# Patient Record
Sex: FEMALE | Race: BLACK OR AFRICAN AMERICAN | Employment: FULL TIME | URBAN - METROPOLITAN AREA
[De-identification: names, ages, dates, MRNs, and addresses within clinical notes are randomized per-mention and may not be internally consistent; named-entity substitution may affect disease eponyms.]

---

## 2017-01-23 ENCOUNTER — APPOINTMENT (OUTPATIENT)
Dept: CT IMAGING | Age: 52
End: 2017-01-23
Attending: EMERGENCY MEDICINE
Payer: OTHER GOVERNMENT

## 2017-01-23 ENCOUNTER — APPOINTMENT (OUTPATIENT)
Dept: GENERAL RADIOLOGY | Age: 52
End: 2017-01-23
Attending: EMERGENCY MEDICINE
Payer: OTHER GOVERNMENT

## 2017-01-23 ENCOUNTER — HOSPITAL ENCOUNTER (EMERGENCY)
Age: 52
Discharge: HOME | End: 2017-01-23
Attending: EMERGENCY MEDICINE
Payer: OTHER GOVERNMENT

## 2017-01-23 VITALS
TEMPERATURE: 97.8 F | DIASTOLIC BLOOD PRESSURE: 62 MMHG | HEART RATE: 80 BPM | WEIGHT: 170 LBS | HEIGHT: 64 IN | OXYGEN SATURATION: 99 % | BODY MASS INDEX: 29.02 KG/M2 | SYSTOLIC BLOOD PRESSURE: 145 MMHG | RESPIRATION RATE: 16 BRPM

## 2017-01-23 DIAGNOSIS — R42 DIZZINESS: ICD-10-CM

## 2017-01-23 DIAGNOSIS — R51.9 ACUTE NONINTRACTABLE HEADACHE, UNSPECIFIED HEADACHE TYPE: Primary | ICD-10-CM

## 2017-01-23 LAB
ANION GAP BLD CALC-SCNC: 9 MMOL/L (ref 3–18)
APPEARANCE UR: CLEAR
BACTERIA URNS QL MICRO: NEGATIVE /HPF
BASOPHILS # BLD AUTO: 0 K/UL (ref 0–0.06)
BASOPHILS # BLD: 0 % (ref 0–2)
BILIRUB UR QL: NEGATIVE
BUN SERPL-MCNC: 10 MG/DL (ref 7–18)
BUN/CREAT SERPL: 15 (ref 12–20)
CALCIUM SERPL-MCNC: 9.5 MG/DL (ref 8.5–10.1)
CHLORIDE SERPL-SCNC: 102 MMOL/L (ref 100–108)
CK MB CFR SERPL CALC: 1.3 % (ref 0–4)
CK MB SERPL-MCNC: 2.6 NG/ML (ref 0.5–3.6)
CK SERPL-CCNC: 199 U/L (ref 26–192)
CO2 SERPL-SCNC: 29 MMOL/L (ref 21–32)
COLOR UR: YELLOW
CREAT SERPL-MCNC: 0.66 MG/DL (ref 0.6–1.3)
DIFFERENTIAL METHOD BLD: NORMAL
EOSINOPHIL # BLD: 0.1 K/UL (ref 0–0.4)
EOSINOPHIL NFR BLD: 2 % (ref 0–5)
EPITH CASTS URNS QL MICRO: NORMAL /LPF (ref 0–5)
ERYTHROCYTE [DISTWIDTH] IN BLOOD BY AUTOMATED COUNT: 13.8 % (ref 11.6–14.5)
GLUCOSE SERPL-MCNC: 91 MG/DL (ref 74–99)
GLUCOSE UR STRIP.AUTO-MCNC: NEGATIVE MG/DL
HCT VFR BLD AUTO: 41.4 % (ref 35–45)
HGB BLD-MCNC: 13.9 G/DL (ref 12–16)
HGB UR QL STRIP: NEGATIVE
KETONES UR QL STRIP.AUTO: NEGATIVE MG/DL
LEUKOCYTE ESTERASE UR QL STRIP.AUTO: ABNORMAL
LYMPHOCYTES # BLD AUTO: 35 % (ref 21–52)
LYMPHOCYTES # BLD: 1.9 K/UL (ref 0.9–3.6)
MAGNESIUM SERPL-MCNC: 2 MG/DL (ref 1.8–2.4)
MCH RBC QN AUTO: 27.3 PG (ref 24–34)
MCHC RBC AUTO-ENTMCNC: 33.6 G/DL (ref 31–37)
MCV RBC AUTO: 81.3 FL (ref 74–97)
MONOCYTES # BLD: 0.5 K/UL (ref 0.05–1.2)
MONOCYTES NFR BLD AUTO: 10 % (ref 3–10)
NEUTS SEG # BLD: 2.8 K/UL (ref 1.8–8)
NEUTS SEG NFR BLD AUTO: 53 % (ref 40–73)
NITRITE UR QL STRIP.AUTO: NEGATIVE
PH UR STRIP: 6.5 [PH] (ref 5–8)
PLATELET # BLD AUTO: 230 K/UL (ref 135–420)
PMV BLD AUTO: 10.8 FL (ref 9.2–11.8)
POTASSIUM SERPL-SCNC: 4.4 MMOL/L (ref 3.5–5.5)
PROT UR STRIP-MCNC: NEGATIVE MG/DL
RBC # BLD AUTO: 5.09 M/UL (ref 4.2–5.3)
RBC #/AREA URNS HPF: NORMAL /HPF (ref 0–5)
SODIUM SERPL-SCNC: 140 MMOL/L (ref 136–145)
SP GR UR REFRACTOMETRY: <1.005 (ref 1–1.03)
TROPONIN I SERPL-MCNC: <0.02 NG/ML (ref 0–0.06)
UROBILINOGEN UR QL STRIP.AUTO: 0.2 EU/DL (ref 0.2–1)
WBC # BLD AUTO: 5.3 K/UL (ref 4.6–13.2)
WBC URNS QL MICRO: NORMAL /HPF (ref 0–4)

## 2017-01-23 PROCEDURE — 71020 XR CHEST PA LAT: CPT

## 2017-01-23 PROCEDURE — 74011250636 HC RX REV CODE- 250/636: Performed by: EMERGENCY MEDICINE

## 2017-01-23 PROCEDURE — 80048 BASIC METABOLIC PNL TOTAL CA: CPT

## 2017-01-23 PROCEDURE — 85025 COMPLETE CBC W/AUTO DIFF WBC: CPT

## 2017-01-23 PROCEDURE — 81001 URINALYSIS AUTO W/SCOPE: CPT

## 2017-01-23 PROCEDURE — 82550 ASSAY OF CK (CPK): CPT | Performed by: EMERGENCY MEDICINE

## 2017-01-23 PROCEDURE — 99283 EMERGENCY DEPT VISIT LOW MDM: CPT

## 2017-01-23 PROCEDURE — 83735 ASSAY OF MAGNESIUM: CPT

## 2017-01-23 PROCEDURE — 96374 THER/PROPH/DIAG INJ IV PUSH: CPT

## 2017-01-23 PROCEDURE — 93005 ELECTROCARDIOGRAM TRACING: CPT

## 2017-01-23 PROCEDURE — 70450 CT HEAD/BRAIN W/O DYE: CPT

## 2017-01-23 PROCEDURE — 96375 TX/PRO/DX INJ NEW DRUG ADDON: CPT

## 2017-01-23 RX ORDER — ONDANSETRON 4 MG/1
4 TABLET, ORALLY DISINTEGRATING ORAL
Qty: 14 TAB | Refills: 0 | Status: SHIPPED | OUTPATIENT
Start: 2017-01-23 | End: 2018-02-19

## 2017-01-23 RX ORDER — MORPHINE SULFATE 4 MG/ML
4 INJECTION, SOLUTION INTRAMUSCULAR; INTRAVENOUS
Status: COMPLETED | OUTPATIENT
Start: 2017-01-23 | End: 2017-01-23

## 2017-01-23 RX ORDER — BUTALBITAL, ACETAMINOPHEN AND CAFFEINE 300; 40; 50 MG/1; MG/1; MG/1
1 CAPSULE ORAL
Qty: 16 CAP | Refills: 0 | Status: SHIPPED | OUTPATIENT
Start: 2017-01-23 | End: 2017-10-16

## 2017-01-23 RX ORDER — ONDANSETRON 2 MG/ML
4 INJECTION INTRAMUSCULAR; INTRAVENOUS
Status: COMPLETED | OUTPATIENT
Start: 2017-01-23 | End: 2017-01-23

## 2017-01-23 RX ADMIN — Medication 4 MG: at 20:06

## 2017-01-23 RX ADMIN — ONDANSETRON 4 MG: 2 INJECTION INTRAMUSCULAR; INTRAVENOUS at 20:05

## 2017-01-23 NOTE — ED NOTES
I performed a brief evaluation, including history and physical, of the patient here in triage and I have determined that pt will need further treatment and evaluation from the main side ER physician. I have placed initial orders to help in expediting patients care.      January 23, 2017 at [facility]        Visit Vitals    BP (!) 152/96 (BP 1 Location: Left arm, BP Patient Position: At rest)    Pulse 88    Temp 97.8 °F (36.6 °C)    Resp 18    Ht 5' 4\" (1.626 m)    Wt 77.1 kg (170 lb)    SpO2 98%    BMI 29.18 kg/m2

## 2017-01-23 NOTE — ED TRIAGE NOTES
Triage: pt states that she has checked her BP at home for the past 3 days and it has been 130/98. Pt concerned that she is hypertensive. Pt states that she has a headache, feels jittery, and lightheaded.

## 2017-01-24 LAB
ATRIAL RATE: 79 BPM
CALCULATED P AXIS, ECG09: 53 DEGREES
CALCULATED R AXIS, ECG10: -7 DEGREES
CALCULATED T AXIS, ECG11: 44 DEGREES
DIAGNOSIS, 93000: NORMAL
P-R INTERVAL, ECG05: 162 MS
Q-T INTERVAL, ECG07: 404 MS
QRS DURATION, ECG06: 78 MS
QTC CALCULATION (BEZET), ECG08: 463 MS
VENTRICULAR RATE, ECG03: 79 BPM

## 2017-01-24 NOTE — ED NOTES
Roxie Bear is a 45 y.o. female that was discharged in stable. Pt was accompanied by spouse. Pt is not driving. The patients diagnosis, condition and treatment were explained to  patient and aftercare instructions were given. The patient verbalized understanding. Patient armband removed and shredded.

## 2017-01-24 NOTE — ED PROVIDER NOTES
HPI Comments: 7:13 PM. Ras Franco is a 45 y.o. female with a history of asthma, GERD, anemia, sickle cell trait, and HTN who presents to the ED c/o HTN, which started approximately 3 days ago. She states that she has been checking her BP at home for the past 3 days. The patient also c/o headache across the front of her head, increased urine, lightheadedness, and feeling \"jittery. \" The patient also c/o sharp chest pain, which started last night, bilateral leg pain, and occasional left arm pain. The patient denies currently experiencing chest pain. She states that she \"sometimes feels off-balance,\" but denies falling. The patient denies back pain. She notes that she does not take BP medications. There are no other concerns at this time. The history is provided by the patient. Past Medical History:   Diagnosis Date    Anemia     Asthma     Bowel obstruction (HCC)     GERD (gastroesophageal reflux disease)     History of blood transfusion     Hypertension     Sickle cell trait (HCC)        Past Surgical History:   Procedure Laterality Date    Hx tubal ligation      Hx appendectomy      Pr abdomen surgery proc unlisted  2006     surgery for bowel obstruction         Family History:   Problem Relation Age of Onset    Cancer Mother     Cancer Brother        Social History     Social History    Marital status: SINGLE     Spouse name: N/A    Number of children: N/A    Years of education: N/A     Occupational History    Not on file. Social History Main Topics    Smoking status: Never Smoker    Smokeless tobacco: Not on file    Alcohol use No    Drug use: No    Sexual activity: Not on file     Other Topics Concern    Not on file     Social History Narrative         ALLERGIES: Iron; Other medication; Penicillins; Sulfa (sulfonamide antibiotics); and Venofer [iron sucrose]    Review of Systems   Constitutional: Negative for fever. HENT: Negative for congestion.     Respiratory: Negative for cough and shortness of breath. Cardiovascular: Positive for chest pain. Positive for HTN   Gastrointestinal: Negative for abdominal pain and vomiting. Genitourinary: Positive for frequency (increased). Musculoskeletal: Positive for myalgias (bilateral legs and occasionally left arm). Negative for back pain. Skin: Negative for rash. Neurological: Positive for light-headedness and headaches. All other systems reviewed and are negative. Vitals:    01/23/17 1840 01/23/17 2100   BP: (!) 152/96 145/62   Pulse: 88 80   Resp: 18 16   Temp: 97.8 °F (36.6 °C)    SpO2: 98% 99%   Weight: 77.1 kg (170 lb)    Height: 5' 4\" (1.626 m)             Physical Exam   Constitutional: She is oriented to person, place, and time. She appears well-developed. HENT:   Head: Normocephalic. Mouth/Throat: Oropharynx is clear and moist.   Eyes: Pupils are equal, round, and reactive to light. Neck: Normal range of motion. Cardiovascular: Normal rate and normal heart sounds. No murmur heard. Pulmonary/Chest: Effort normal. She has no wheezes. She has no rales. Mid-sternal chest wall tenderness   Abdominal: Soft. There is no tenderness. Musculoskeletal: Normal range of motion. She exhibits no edema. Neurological: She is alert and oriented to person, place, and time. Skin: Skin is warm and dry. Nursing note and vitals reviewed. [facility]  ED Course       Procedures      Vitals:  No data found. 98% on RA, indicating adequate oxygenation. Medications ordered:   Medications   morphine injection 4 mg (4 mg IntraVENous Given 1/23/17 2006)   ondansetron (ZOFRAN) injection 4 mg (4 mg IntraVENous Given 1/23/17 2005)         Lab findings:  No results found for this or any previous visit (from the past 12 hour(s)). X-Ray, CT or other radiology findings or impressions:  CT HEAD WO CONT   Final Result   IMPRESSION (per radiology):     No evidence of an acute intracranial process.   Stable exam   XR CHEST PA LAT    No acute process (Dr. Hollis Salgado)         Progress notes, Consult notes or additional Procedure notes:   9:09 PM. Patient feeling better and has no complaints. No emc. Stable for dc and close f/u. Det ret inst given. Disposition:  Diagnosis:   1. Acute nonintractable headache, unspecified headache type    2. Dizziness        Disposition: discharged    Follow-up Information     Follow up With Details Comments Contact Info    [facility] Schedule an appointment as soon as possible for a visit in 2 days  [address] [facility] [address]           Discharge Medication List as of 1/23/2017  9:16 PM      START taking these medications    Details   ondansetron (ZOFRAN ODT) 4 mg disintegrating tablet Take 1 Tab by mouth every eight (8) hours as needed for Nausea. , Print, Disp-14 Tab, R-0      butalbital-acetaminophen-caff (FIORICET) -40 mg per capsule Take 1 Cap by mouth every six (6) hours as needed for Pain. Max Daily Amount: 4 Caps., Print, Disp-16 Cap, R-0         CONTINUE these medications which have NOT CHANGED    Details   ascorbic acid (VITAMIN C) 500 mg tablet Take 500 mg by mouth daily. , Historical Med      potassium chloride (KLOR-CON) 20 mEq packet Take 20 mEq by mouth daily. , Historical Med      diphenhydrAMINE (BENADRYL) 25 mg capsule Take 25 mg by mouth every six (6) hours as needed., Historical Med      fluticasone-salmeterol (ADVAIR DISKUS) 250-50 mcg/dose diskus inhaler Take 1 Puff by inhalation every twelve (12) hours. , Historical Med      ferrous sulfate (IRON) 325 mg (65 mg iron) tablet Take 1 Tab by mouth daily. , Normal, Disp-30 Tab, R-5      hydrOXYzine (VISTARIL) 25 mg capsule 1 tab every 6 hours as needed for itching., Normal, Disp-60 Cap, R-1      ergocalciferol (VITAMIN D2) 50,000 unit capsule Take 1 Cap by mouth every seven (7) days. , Normal, Disp-12 Cap, R-1      CYANOCOBALAMIN, VITAMIN B-12, (VITAMIN B-12 PO) Take  by mouth., Historical Med      albuterol (PROVENTIL, VENTOLIN) 90 mcg/actuation inhaler Take 1-2 Puffs by inhalation every four (4) hours as needed for Wheezing., Print, Disp-1 g, R-3      HYDROcodone-acetaminophen (NORCO) 5-325 mg per tablet Take 1-2 Tabs by mouth every eight (8) hours as needed for Pain. Max Daily Amount: 6 Tabs., Print, Disp-20 Tab, R-0      triamcinolone acetonide (KENALOG) 0.1 % ointment Apply  to affected area two (2) times a day. use thin layer, Normal, Disp-30 g, R-3      naproxen sodium (ANAPROX DS) 550 mg tablet Take 1 Tab by mouth two (2) times daily (with meals). PRN pain, Print, Disp-20 Tab, R-0           Scribe Attestation:   I, Brandon Muir, am scribing for and in the presence of Dilcia Palomino MD January 23, 2017 at 7:52 PM     Signed by: Merary Rodney, 01/23/17, 7:52 PM     Physician Attestation:   I personally performed the services described in this documentation, reviewed and edited the documentation which was dictated to the scribe in my presence, and it accurately records my words and actions.    Dilcia Palomino MD

## 2017-01-24 NOTE — DISCHARGE INSTRUCTIONS
Return for pain, fever, shortness of breath, vomiting, decreased fluid intake, weakness, numbness, dizziness, or any change or concerns. Dizziness: Care Instructions  Your Care Instructions  Dizziness is the feeling of unsteadiness or fuzziness in your head. It is different than having vertigo, which is a feeling that the room is spinning or that you are moving or falling. It is also different from lightheadedness, which is the feeling that you are about to faint. It can be hard to know what causes dizziness. Some people feel dizzy when they have migraine headaches. Sometimes bouts of flu can make you feel dizzy. Some medical conditions, such as heart problems or high blood pressure, can make you feel dizzy. Many medicines can cause dizziness, including medicines for high blood pressure, pain, or anxiety. If a medicine causes your symptoms, your doctor may recommend that you stop or change the medicine. If it is a problem with your heart, you may need medicine to help your heart work better. If there is no clear reason for your symptoms, your doctor may suggest watching and waiting for a while to see if the dizziness goes away on its own. Follow-up care is a key part of your treatment and safety. Be sure to make and go to all appointments, and call your doctor if you are having problems. It's also a good idea to know your test results and keep a list of the medicines you take. How can you care for yourself at home? · If your doctor recommends or prescribes medicine, take it exactly as directed. Call your doctor if you think you are having a problem with your medicine. · Do not drive while you feel dizzy. · Try to prevent falls. Steps you can take include:  ¨ Using nonskid mats, adding grab bars near the tub, and using night-lights. ¨ Clearing your home so that walkways are free of anything you might trip on. ¨ Letting family and friends know that you have been feeling dizzy.  This will help them know how to help you. When should you call for help? Call 911 anytime you think you may need emergency care. For example, call if:  · You passed out (lost consciousness). · You have dizziness along with symptoms of a heart attack. These may include:  ¨ Chest pain or pressure, or a strange feeling in the chest.  ¨ Sweating. ¨ Shortness of breath. ¨ Nausea or vomiting. ¨ Pain, pressure, or a strange feeling in the back, neck, jaw, or upper belly or in one or both shoulders or arms. ¨ Lightheadedness or sudden weakness. ¨ A fast or irregular heartbeat. · You have symptoms of a stroke. These may include:  ¨ Sudden numbness, tingling, weakness, or loss of movement in your face, arm, or leg, especially on only one side of your body. ¨ Sudden vision changes. ¨ Sudden trouble speaking. ¨ Sudden confusion or trouble understanding simple statements. ¨ Sudden problems with walking or balance. ¨ A sudden, severe headache that is different from past headaches. Call your doctor now or seek immediate medical care if:  · You feel dizzy and have a fever, headache, or ringing in your ears. · You have new or increased nausea and vomiting. · Your dizziness does not go away or comes back. Watch closely for changes in your health, and be sure to contact your doctor if:  · You do not get better as expected. Where can you learn more? Go to http://giselle-pradeep.info/. Enter N628 in the search box to learn more about \"Dizziness: Care Instructions. \"  Current as of: May 27, 2016  Content Version: 11.1  © 4899-6797 [employer]. Care instructions adapted under license by [employer] (which disclaims liability or warranty for this information). If you have questions about a medical condition or this instruction, always ask your healthcare professional. [address] any warranty or liability for your use of this information.            Headache: Care Instructions  Your Care Instructions    Headaches have many possible causes. Most headaches aren't a sign of a more serious problem, and they will get better on their own. Home treatment may help you feel better faster. The doctor has checked you carefully, but problems can develop later. If you notice any problems or new symptoms, get medical treatment right away. Follow-up care is a key part of your treatment and safety. Be sure to make and go to all appointments, and call your doctor if you are having problems. It's also a good idea to know your test results and keep a list of the medicines you take. How can you care for yourself at home? · Do not drive if you have taken a prescription pain medicine. · Rest in a quiet, dark room until your headache is gone. Close your eyes and try to relax or go to sleep. Don't watch TV or read. · Put a cold, moist cloth or cold pack on the painful area for 10 to 20 minutes at a time. Put a thin cloth between the cold pack and your skin. · Use a warm, moist towel or a heating pad set on low to relax tight shoulder and neck muscles. · Have someone gently massage your neck and shoulders. · Take pain medicines exactly as directed. ¨ If the doctor gave you a prescription medicine for pain, take it as prescribed. ¨ If you are not taking a prescription pain medicine, ask your doctor if you can take an over-the-counter medicine. · Be careful not to take pain medicine more often than the instructions allow, because you may get worse or more frequent headaches when the medicine wears off. · Do not ignore new symptoms that occur with a headache, such as a fever, weakness or numbness, vision changes, or confusion. These may be signs of a more serious problem. To prevent headaches  · Keep a headache diary so you can figure out what triggers your headaches. Avoiding triggers may help you prevent headaches.  Record when each headache began, how long it lasted, and what the pain was like (throbbing, aching, stabbing, or dull). Write down any other symptoms you had with the headache, such as nausea, flashing lights or dark spots, or sensitivity to bright light or loud noise. Note if the headache occurred near your period. List anything that might have triggered the headache, such as certain foods (chocolate, cheese, wine) or odors, smoke, bright light, stress, or lack of sleep. · Find healthy ways to deal with stress. Headaches are most common during or right after stressful times. Take time to relax before and after you do something that has caused a headache in the past.  · Try to keep your muscles relaxed by keeping good posture. Check your jaw, face, neck, and shoulder muscles for tension, and try relaxing them. When sitting at a desk, change positions often, and stretch for 30 seconds each hour. · Get plenty of sleep and exercise. · Eat regularly and well. Long periods without food can trigger a headache. · Treat yourself to a massage. Some people find that regular massages are very helpful in relieving tension. · Limit caffeine by not drinking too much coffee, tea, or soda. But don't quit caffeine suddenly, because that can also give you headaches. · Reduce eyestrain from computers by blinking frequently and looking away from the computer screen every so often. Make sure you have proper eyewear and that your monitor is set up properly, about an arm's length away. · Seek help if you have depression or anxiety. Your headaches may be linked to these conditions. Treatment can both prevent headaches and help with symptoms of anxiety or depression. When should you call for help? Call 911 anytime you think you may need emergency care. For example, call if:  · You have signs of a stroke. These may include:  ¨ Sudden numbness, paralysis, or weakness in your face, arm, or leg, especially on only one side of your body. ¨ Sudden vision changes. ¨ Sudden trouble speaking.   ¨ Sudden confusion or trouble understanding simple statements. ¨ Sudden problems with walking or balance. ¨ A sudden, severe headache that is different from past headaches. Call your doctor now or seek immediate medical care if:  · You have a new or worse headache. · Your headache gets much worse. Where can you learn more? Go to http://giselle-pradeep.info/. Enter M271 in the search box to learn more about \"Headache: Care Instructions. \"  Current as of: February 19, 2016  Content Version: 11.1  © 7740-4203 [employer]. Care instructions adapted under license by [employer] (which disclaims liability or warranty for this information). If you have questions about a medical condition or this instruction, always ask your healthcare professional. [address] any warranty or liability for your use of this information.

## 2017-04-27 ENCOUNTER — HOSPITAL ENCOUNTER (EMERGENCY)
Age: 52
Discharge: HOME | End: 2017-04-27
Attending: EMERGENCY MEDICINE
Payer: OTHER GOVERNMENT

## 2017-04-27 ENCOUNTER — APPOINTMENT (OUTPATIENT)
Dept: GENERAL RADIOLOGY | Age: 52
End: 2017-04-27
Attending: EMERGENCY MEDICINE
Payer: OTHER GOVERNMENT

## 2017-04-27 VITALS
RESPIRATION RATE: 16 BRPM | SYSTOLIC BLOOD PRESSURE: 131 MMHG | DIASTOLIC BLOOD PRESSURE: 91 MMHG | TEMPERATURE: 98 F | BODY MASS INDEX: 29.18 KG/M2 | HEART RATE: 80 BPM | WEIGHT: 170 LBS | OXYGEN SATURATION: 98 %

## 2017-04-27 DIAGNOSIS — S63.259A DISLOCATED FINGER, INITIAL ENCOUNTER: Primary | ICD-10-CM

## 2017-04-27 PROCEDURE — 77030008323 HC SPLNT FNGR GTR DJOR -A

## 2017-04-27 PROCEDURE — 73130 X-RAY EXAM OF HAND: CPT

## 2017-04-27 PROCEDURE — 75810000301 HC ER LEVEL 1 CLOSED TREATMNT FRACTURE/DISLOCATION

## 2017-04-27 PROCEDURE — 99283 EMERGENCY DEPT VISIT LOW MDM: CPT

## 2017-04-27 NOTE — ED PROVIDER NOTES
HPI Comments: 8:54 AM Sommer Brown is a 45 y.o. female with a history of asthma, sickle cell trait, and HTN who presents to ED c/o left 4th finger pain that she first noticed when she woke up this morning. Pt explains she is unable to straighten the finger. Pt says her finger has \"popped and hurt\" in the past but denies ever dislocating the finger. Pt denies trauma or injury. Pt also notes she was unable to straighten one of her other fingers on her left hand but that it has since \"relaxed. \" No other concerns at this time. Patient is a 45 y.o. female presenting with finger pain. Finger Pain    Pertinent negatives include no back pain. Past Medical History:   Diagnosis Date    Anemia     Asthma     Bowel obstruction (HCC)     GERD (gastroesophageal reflux disease)     History of blood transfusion     Hypertension     Sickle cell trait (HCC)        Past Surgical History:   Procedure Laterality Date    ABDOMEN SURGERY PROC UNLISTED  2006    surgery for bowel obstruction    HX APPENDECTOMY      HX TUBAL LIGATION           Family History:   Problem Relation Age of Onset    Cancer Mother     Cancer Brother        Social History     Social History    Marital status: SINGLE     Spouse name: N/A    Number of children: N/A    Years of education: N/A     Occupational History    Not on file. Social History Main Topics    Smoking status: Never Smoker    Smokeless tobacco: Not on file    Alcohol use No    Drug use: No    Sexual activity: Not on file     Other Topics Concern    Not on file     Social History Narrative         ALLERGIES: Iron; Other medication; Penicillins; Sulfa (sulfonamide antibiotics); and Venofer [iron sucrose]    Review of Systems   Constitutional: Negative for chills, fatigue, fever and unexpected weight change. HENT: Negative for congestion and rhinorrhea. Respiratory: Negative for chest tightness and shortness of breath.     Musculoskeletal: Positive for arthralgias (left MCP) and myalgias (left 4th finger). Negative for back pain. Neurological: Negative for dizziness and weakness. Psychiatric/Behavioral: The patient is not nervous/anxious. All other systems reviewed and are negative. Vitals:    04/27/17 0847   BP: (!) 131/91   Pulse: 80   Resp: 16   Temp: 98 °F (36.7 °C)   SpO2: 98%   Weight: 77.1 kg (170 lb)            Physical Exam   Constitutional: She is oriented to person, place, and time. She appears well-developed. HENT:   Head: Normocephalic and atraumatic. Mouth/Throat: Oropharynx is clear and moist.   Eyes: Conjunctivae and EOM are normal. Pupils are equal, round, and reactive to light. Neck: Normal range of motion. Neck supple. Cardiovascular: Normal rate and regular rhythm. Pulmonary/Chest: Effort normal and breath sounds normal.   Abdominal: Soft. Musculoskeletal: Normal range of motion. She exhibits tenderness. She exhibits no edema. Left 4th finger is in flexion and is acutely tender to palpation at MCP joint, no swelling or erythema. Neurological: She is alert and oriented to person, place, and time. Skin: Skin is warm and dry. Psychiatric: She has a normal mood and affect. Nursing note and vitals reviewed. [facility]  ED Course       Procedures    Vitals:  Patient Vitals for the past 12 hrs:   Temp Pulse Resp BP SpO2   04/27/17 0847 98 °F (36.7 °C) 80 16 (!) 131/91 98 %     98% on RA, indicating adequate oxygenation. Medications ordered:   Medications - No data to display      Lab findings:  No results found for this or any previous visit (from the past 12 hour(s)). X-Ray, CT or other radiology findings or impressions:  XR HAND LT MIN 3 V    (Read by )  Normal       Progress notes, Consult notes or additional Procedure notes:   9:27 AM  Disposition:  Diagnosis: No diagnosis found. Disposition: discharged Dicussed all results with Pt. All questions answered at this time.  Pt discharged in stable condition with appropriate follow up. Follow-up Information     None            Patient's Medications   Start Taking    No medications on file   Continue Taking    ALBUTEROL (PROVENTIL, VENTOLIN) 90 MCG/ACTUATION INHALER    Take 1-2 Puffs by inhalation every four (4) hours as needed for Wheezing. ASCORBIC ACID (VITAMIN C) 500 MG TABLET    Take 500 mg by mouth daily. BUTALBITAL-ACETAMINOPHEN-CAFF (FIORICET) -40 MG PER CAPSULE    Take 1 Cap by mouth every six (6) hours as needed for Pain. Max Daily Amount: 4 Caps. CYANOCOBALAMIN, VITAMIN B-12, (VITAMIN B-12 PO)    Take  by mouth. DIPHENHYDRAMINE (BENADRYL) 25 MG CAPSULE    Take 25 mg by mouth every six (6) hours as needed. ERGOCALCIFEROL (VITAMIN D2) 50,000 UNIT CAPSULE    Take 1 Cap by mouth every seven (7) days. FERROUS SULFATE (IRON) 325 MG (65 MG IRON) TABLET    Take 1 Tab by mouth daily. FLUTICASONE-SALMETEROL (ADVAIR DISKUS) 250-50 MCG/DOSE DISKUS INHALER    Take 1 Puff by inhalation every twelve (12) hours. HYDROCODONE-ACETAMINOPHEN (NORCO) 5-325 MG PER TABLET    Take 1-2 Tabs by mouth every eight (8) hours as needed for Pain. Max Daily Amount: 6 Tabs. HYDROXYZINE (VISTARIL) 25 MG CAPSULE    1 tab every 6 hours as needed for itching. NAPROXEN SODIUM (ANAPROX DS) 550 MG TABLET    Take 1 Tab by mouth two (2) times daily (with meals). PRN pain    ONDANSETRON (ZOFRAN ODT) 4 MG DISINTEGRATING TABLET    Take 1 Tab by mouth every eight (8) hours as needed for Nausea. POTASSIUM CHLORIDE (KLOR-CON) 20 MEQ PACKET    Take 20 mEq by mouth daily. TRIAMCINOLONE ACETONIDE (KENALOG) 0.1 % OINTMENT    Apply  to affected area two (2) times a day.  use thin layer   These Medications have changed    No medications on file   Stop Taking    No medications on file     Scribe Attestation:     Amelia QUINTANA, scribing for and in the presence of  Ramirez Garcia MD April 27, 2017 at 9:28 AM     Physician Attestation:   I personally performed the services described in this documentation, reviewed and edited the documentation which was dictated to the scribe in my presence, and it accurately records my words and actions. Lincoln Trejo MD  April 27, 2017     Signed by: Merary Oliver, 04/27/17, 9:00 AM                     Proced: L ring finger is dorsally dislocated at the MC-P joint. I reduced this by gentle traction. Pt tolerated well. FROM in the digit afterwards.   Lincoln Trejo MD  9:30 AM

## 2017-04-27 NOTE — ED NOTES
Christie Henson is a 45 y.o. female that was discharged in stable. Pt was accompanied by spouse. Pt is not driving. The patients diagnosis, condition and treatment were explained to  patient and aftercare instructions were given. The patient verbalized understanding. Patient discharged without removing armband and transfered to another healthcare acute, sub acute , or extended care facility. Informed of privacy risks if armband lost or stolen .

## 2017-04-27 NOTE — DISCHARGE INSTRUCTIONS
Finger Dislocation: Rehab Exercises  Your Care Instructions  Here are some examples of typical rehabilitation exercises for your condition. Start each exercise slowly. Ease off the exercise if you start to have pain. Your doctor or your physical or occupational therapist will tell you when you can start these exercises and which ones will work best for you. How to do the exercises  Finger extension    1. Place your hand flat on a table, palm down. 2. Lift and then lower your affected finger off the table. 3. Repeat 8 to 12 times. MP extension    1. Place your good hand on a table, palm up. Put your hand with the affected finger on top of your good hand with your fingers wrapped around the thumb of your good hand like you are making a fist.  2. Slowly uncurl the joints of your hand with the affected finger where your fingers connect to your hand so that only the top two joints of your fingers are bent. Your fingers will look like a hook. 3. Move back to your starting position, with your fingers wrapped around your good thumb. 4. Repeat 8 to 12 times. DIP flexion    1. With your good hand, grasp your affected finger. Your thumb will be on the top side of your finger just below the joint that is closest to your fingernail. 2. Slowly bend your affected finger only at the joint closest to your fingernail. Hold for about 6 seconds. 3. Repeat 8 to 12 times. PIP extension (with MP extension)    1. Place your good hand on a table, palm up. Put your hand with the affected finger on top of your good hand. 2. Use the thumb and fingers of your good hand to grasp below the middle joint of your affected finger. 3. Bend and then straighten the last two joints of your affected finger. 4. Repeat 8 to 12 times. Isolated PIP flexion    1. Place the hand with the affected finger flat on a table, palm up. With your other hand, press down on the fingers that are not affected.  Your affected finger will be free to move.  2. Slowly bend your affected finger. Hold for about 6 seconds. Then straighten your finger. 3. Repeat 8 to 12 times. Imaginary ball squeeze    1. Pretend to hold an imaginary ball. 2. Slowly bend your fingers around the imaginary ball, and squeeze the \"ball\" for about 6 seconds. Then slowly straighten your fingers to release the \"ball. \"  3. Repeat 8 to 12 times. Tendon glides    In this exercise, the steps follow one another to a make a continuous movement. 1. Hold your hand upward. Your fingers and thumb will be pointing straight up. Your wrist should be relaxed, following the line of your fingers and thumb. 2. Curl your fingers so that the top two joints in them are bent, and your fingers wrap down. Your fingertips should touch or be near the base of your fingers. Your fingers will look like a hook. 3. Make a fist by bending your knuckles. Your thumb can gently rest against your index (pointing) finger. 4. Unwind your fingers slightly so that your fingertips can touch the base of your palm. Your thumb can rest against your index finger. 5. Move back to your starting position, with your fingers and thumb pointing up. 6. Repeat the series of motions 8 to 12 times. Towel squeeze    1. Place a small towel roll on a table. 2. With your palm facing down, grab the towel and squeeze it for about 6 seconds. Then slowly straighten your fingers to release the towel. 3. Repeat 8 to 12 times. Towel grab    1. Fold a small towel in half, and lay it flat on a table. 2. Put your hand flat on the towel, palm down. Grab the towel, and scrunch it toward you until your hand is in a fist.  3. Slowly straighten your fingers to push the towel back so it is flat on the table again. 4. Repeat 8 to 12 times. Follow-up care is a key part of your treatment and safety. Be sure to make and go to all appointments, and call your doctor if you are having problems.  It's also a good idea to know your test results and keep a list of the medicines you take. Where can you learn more? Go to http://giselle-pradeep.info/. Enter O790 in the search box to learn more about \"Finger Dislocation: Rehab Exercises. \"  Current as of: May 23, 2016  Content Version: 11.2  © 8976-4062 [employer] Care instructions adapted under license by [employer] (which disclaims liability or warranty for this information). If you have questions about a medical condition or this instruction, always ask your healthcare professional. [address] any warranty or liability for your use of this information. Dislocated Finger: Care Instructions  Your Care Instructions  When the bones of a finger are forced out of their normal position, it is called a dislocated finger. This can happen when a finger jams or bends backward. It is common during sports. A doctor can put your finger back in its normal position. You probably knew that something was wrong with your finger right away. This is because a dislocated finger usually hurts a lot. And it doesn't look straight. Your doctor may have put a splint on the finger. This will keep it in position while it heals. Your doctor may also recommend exercises to strengthen your finger. Rest and home treatment can also help you get better. If you damaged bones or muscles, you may need more treatment. Follow-up care is a key part of your treatment and safety. Be sure to make and go to all appointments, and call your doctor if you are having problems. It's also a good idea to know your test results and keep a list of the medicines you take. How can you care for yourself at home? · If your doctor put a splint on your finger, wear it as directed. Do not remove it until your doctor says you can. · Do not do anything that makes the pain worse. · If your finger is swollen, put ice or a cold pack on it for 10 to 20 minutes at a time.  Try to do this every 1 to 2 hours for the next 3 days (when you are awake) or until the swelling goes down. Put a thin cloth between the ice and your skin. · Prop up your hand on a pillow when you ice it or anytime you sit or lie down during the next 3 days. Try to keep it above the level of your heart. This will help reduce swelling. · Take your medicines exactly as prescribed. Call your doctor if you think you are having a problem with your medicine. · Ask your doctor if you can take an over-the-counter pain medicine, such as acetaminophen (Tylenol), ibuprofen (Advil, Motrin), or naproxen (Aleve). Be safe with medicines. Read and follow all instructions on the label. · If your doctor recommends exercises, do them as directed. When should you call for help? Call your doctor now or seek immediate medical care if:  · You have signs that your finger may be dislocated again, such as:  ¨ Severe pain. ¨ A finger that looks like a bone is out of position. ¨ Not being able to bend or straighten your finger. · Your finger or hand is cool or pale or changes color. · You cannot feel or move your finger. Watch closely for changes in your health, and be sure to contact your doctor if:  · You do not get better as expected. Where can you learn more? Go to http://giselle-pradeep.info/. Enter R117 in the search box to learn more about \"Dislocated Finger: Care Instructions. \"  Current as of: May 23, 2016  Content Version: 11.2  © 1030-6559 [employer]. Care instructions adapted under license by [employer] (which disclaims liability or warranty for this information). If you have questions about a medical condition or this instruction, always ask your healthcare professional. [place] [address] any warranty or liability for your use of this information.

## 2017-05-11 ENCOUNTER — OFFICE VISIT (OUTPATIENT)
Dept: ORTHOPEDIC SURGERY | Facility: CLINIC | Age: 52
End: 2017-05-11

## 2017-05-11 VITALS
WEIGHT: 200 LBS | BODY MASS INDEX: 34.33 KG/M2 | DIASTOLIC BLOOD PRESSURE: 88 MMHG | TEMPERATURE: 98 F | HEART RATE: 70 BPM | SYSTOLIC BLOOD PRESSURE: 132 MMHG

## 2017-05-11 DIAGNOSIS — M79.642 LEFT HAND PAIN: ICD-10-CM

## 2017-05-11 DIAGNOSIS — M25.642 FINGER STIFFNESS, LEFT: ICD-10-CM

## 2017-05-11 DIAGNOSIS — D50.9 IRON DEFICIENCY ANEMIA, UNSPECIFIED IRON DEFICIENCY ANEMIA TYPE: ICD-10-CM

## 2017-05-11 DIAGNOSIS — D57.3 SICKLE CELL TRAIT (HCC): ICD-10-CM

## 2017-05-11 DIAGNOSIS — M65.342 TRIGGER FINGER, LEFT RING FINGER: Primary | ICD-10-CM

## 2017-05-11 DIAGNOSIS — M19.041 OSTEOARTHRITIS OF FINGER, RIGHT: ICD-10-CM

## 2017-05-11 RX ORDER — BETAMETHASONE SODIUM PHOSPHATE AND BETAMETHASONE ACETATE 3; 3 MG/ML; MG/ML
3 INJECTION, SUSPENSION INTRA-ARTICULAR; INTRALESIONAL; INTRAMUSCULAR; SOFT TISSUE ONCE
Qty: 0.5 ML | Refills: 0
Start: 2017-05-11 | End: 2017-05-11

## 2017-05-11 RX ORDER — BUPIVACAINE HYDROCHLORIDE 2.5 MG/ML
0.5 INJECTION, SOLUTION EPIDURAL; INFILTRATION; INTRACAUDAL ONCE
Qty: 0.5 ML | Refills: 0
Start: 2017-05-11 | End: 2017-05-11

## 2017-05-11 NOTE — MR AVS SNAPSHOT
Visit Information Date & Time Provider Department Dept. Phone Encounter #  
 5/11/2017  9:55 AM Tyrel Lewis MD [address] [facility] - [facility] [phone] [id number] Follow-up Instructions Return if symptoms worsen or fail to improve. Upcoming Health Maintenance Date Due Hepatitis C Screening 1965 PAP AKA CERVICAL CYTOLOGY 7/25/1985 BREAST CANCER SCRN MAMMOGRAM 7/25/2014 FOBT Q 1 YEAR AGE 50-75 7/25/2014 INFLUENZA AGE 9 TO ADULT 8/1/2017 DTaP/Tdap/Td series (2 - Td) 9/16/2024 Allergies as of 5/11/2017  Review Complete On: 5/11/2017 By: Jerrod See Severity Noted Reaction Type Reactions Iron High 01/03/2013   Systemic Anaphylaxis Other Medication High 03/19/2015   Systemic Nausea and Vomiting All \"mycins\" Penicillins  01/03/2013   Topical Rash  
 Sulfa (Sulfonamide Antibiotics)  01/03/2013   Topical Hives Venofer [Iron Sucrose]  04/04/2013    Rash, Itching Current Immunizations  Reviewed on 8/11/2016 Name Date Tdap 9/16/2014  7:02 PM  
  
 Not reviewed this visit You Were Diagnosed With   
  
 Codes Comments Trigger finger, left ring finger    -  Primary ICD-10-CM: V98.659 ICD-9-CM: 727.03 Left hand pain     ICD-10-CM: C06.781 ICD-9-CM: 729.5 Sickle cell trait ([facility]     ICD-10-CM: D57.3 ICD-9-CM: 282.5 Iron deficiency anemia, unspecified iron deficiency anemia type     ICD-10-CM: D50.9 ICD-9-CM: 280.9 BMI 34.0-34.9,adult     ICD-10-CM: V95.08 
ICD-9-CM: V85.34 Finger stiffness, left     ICD-10-CM: M58.354 ICD-9-CM: 719.54 Ring finger Osteoarthritis of finger, right     ICD-10-CM: M19.041 ICD-9-CM: 715.94 Second MCP joint Vitals BP Pulse Temp Weight(growth percentile) BMI OB Status 132/88 (BP 1 Location: Left arm, BP Patient Position: Sitting) 70 98 °F (36.7 °C) (Oral) 200 lb (90.7 kg) 34.33 kg/m2 Menopause Smoking Status Never Smoker BMI and BSA Data Body Mass Index Body Surface Area  
 34.33 kg/m 2 2.02 m 2 Preferred Pharmacy Pharmacy Name [facility] [address] [phone] Your Updated Medication List  
  
   
This list is accurate as of: 5/11/17 10:22 AM.  Always use your most recent med list.  
  
  
  
  
 Lonzie Kava 250-50 mcg/dose diskus inhaler Generic drug:  fluticasone-salmeterol Take 1 Puff by inhalation every twelve (12) hours. albuterol 90 mcg/actuation inhaler Commonly known as:  Nevelyn Hickory Take 1-2 Puffs by inhalation every four (4) hours as needed for Wheezing. ascorbic acid (vitamin C) 500 mg tablet Commonly known as:  VITAMIN C Take 500 mg by mouth daily. BENADRYL 25 mg capsule Generic drug:  diphenhydrAMINE Take 25 mg by mouth every six (6) hours as needed. butalbital-acetaminophen-caff -40 mg per capsule Commonly known as:  [employer] Take 1 Cap by mouth every six (6) hours as needed for Pain. Max Daily Amount: 4 Caps.  
  
 ergocalciferol 50,000 unit capsule Commonly known as:  VITAMIN D2 Take 1 Cap by mouth every seven (7) days. ferrous sulfate 325 mg (65 mg iron) tablet Commonly known as:  Iron Take 1 Tab by mouth daily. HYDROcodone-acetaminophen 5-325 mg per tablet Commonly known as:  Monica Supa Take 1-2 Tabs by mouth every eight (8) hours as needed for Pain. Max Daily Amount: 6 Tabs. hydrOXYzine pamoate 25 mg capsule Commonly known as:  VISTARIL  
1 tab every 6 hours as needed for itching. naproxen sodium 550 mg tablet Commonly known as:  Marlane Rout DS Take 1 Tab by mouth two (2) times daily (with meals). PRN pain  
  
 ondansetron 4 mg disintegrating tablet Commonly known as:  ZOFRAN ODT Take 1 Tab by mouth every eight (8) hours as needed for Nausea. potassium chloride 20 mEq packet Commonly known as:  KLOR-CON  
 Take 20 mEq by mouth daily. triamcinolone acetonide 0.1 % ointment Commonly known as:  KENALOG Apply  to affected area two (2) times a day. use thin layer VITAMIN B-12 PO Take  by mouth. Follow-up Instructions Return if symptoms worsen or fail to improve. Patient Instructions Trigger Finger: Care Instructions Your Care Instructions A trigger finger is a finger stuck in a bent position. The bent finger usually straightens out on its own. A trigger finger can be painful, but it normally is not a serious problem. Trigger fingers seem to occur more in some groups of people. These include people who have diabetes or arthritis or who have injured their hands in the past. This problem also occurs in musicians and people who  tools often. Rest, exercises, and other things you can do at home may help your trigger finger relax so that it can bend as it should. You may get a corticosteroid shot. This can reduce swelling and pain. Your doctor may put a splint on your finger. This will give your finger some rest and avoid irritating the joint. You may need surgery if the finger keeps locking in a bent position. Follow-up care is a key part of your treatment and safety. Be sure to make and go to all appointments, and call your doctor if you are having problems. It's also a good idea to know your test results and keep a list of the medicines you take. How can you care for yourself at home? · If your doctor put a splint on your finger, wear the splint as directed. Do not remove it until your doctor says you can. · You may need to change your activities to avoid movements that irritate the finger. · If your finger is swollen, put ice or a cold pack on your finger for 10 to 20 minutes at a time. Try to do this every 1 to 2 hours for the next 3 days (when you are awake) or until the swelling goes down. Put a thin cloth between the ice and your skin. · Prop up your hand on a pillow when you ice it or anytime you sit or lie down during the next 3 days. Try to keep it above the level of your heart. This will help reduce swelling. · Take your medicines exactly as prescribed. Call your doctor if you think you are having a problem with your medicine. · Ask your doctor if you can take an over-the-counter pain medicine, such as acetaminophen (Tylenol), ibuprofen (Advil, Motrin), or naproxen (Aleve). Be safe with medicines. Read and follow all instructions on the label. · If your doctor recommends exercises, do them as directed. When should you call for help? Call your doctor now or seek immediate medical care if: 
· Your finger locks in a bent position and will not straighten. Watch closely for changes in your health, and be sure to contact your doctor if: 
· You do not get better as expected. Where can you learn more? Go to http://giselle-pradeep.info/. Enter M826 in the search box to learn more about \"Trigger Finger: Care Instructions. \" Current as of: May 23, 2016 Content Version: 11.2 © 5933-8457 [employer]. Care instructions adapted under license by [employer] (which disclaims liability or warranty for this information). If you have questions about a medical condition or this instruction, always ask your healthcare professional. [address] any warranty or liability for your use of this information. Introducing [facility]! [employer] introduces [employer] patient portal. Now you can access parts of your medical record, email your doctor's office, and request medication refills online. 1. In your internet browser, go to [email or web address] 2. Click on the First Time User? Click Here link in the Sign In box. You will see the New Member Sign Up page. 3. Enter your [employer] Access Code exactly as it appears below.  You will not need to use this code after youve completed the sign-up process. If you do not sign up before the expiration date, you must request a new code. · [employer] Access Code: [email or web address] Expires: 7/26/2017  8:48 AM 
 
4. Enter the last four digits of your Social Security Number (xxxx) and Date of Birth (mm/dd/yyyy) as indicated and click Submit. You will be taken to the next sign-up page. 5. Create a [employer] ID. This will be your [employer] login ID and cannot be changed, so think of one that is secure and easy to remember. 6. Create a [employer] password. You can change your password at any time. 7. Enter your Password Reset Question and Answer. This can be used at a later time if you forget your password. 8. Enter your e-mail address. You will receive e-mail notification when new information is available in [address]. 9. Click Sign Up. You can now view and download portions of your medical record. 10. Click the Download Summary menu link to download a portable copy of your medical information. If you have questions, please visit the Frequently Asked Questions section of the [employer] website. Remember, [employer] is NOT to be used for urgent needs. For medical emergencies, dial 911. Now available from your iPhone and Android! Please provide this summary of care documentation to your next provider. Your primary care clinician is listed as NONE. If you have any questions after today's visit, please call [phone].

## 2017-05-11 NOTE — PATIENT INSTRUCTIONS
Trigger Finger: Care Instructions  Your Care Instructions  A trigger finger is a finger stuck in a bent position. The bent finger usually straightens out on its own. A trigger finger can be painful, but it normally is not a serious problem. Trigger fingers seem to occur more in some groups of people. These include people who have diabetes or arthritis or who have injured their hands in the past. This problem also occurs in musicians and people who  tools often. Rest, exercises, and other things you can do at home may help your trigger finger relax so that it can bend as it should. You may get a corticosteroid shot. This can reduce swelling and pain. Your doctor may put a splint on your finger. This will give your finger some rest and avoid irritating the joint. You may need surgery if the finger keeps locking in a bent position. Follow-up care is a key part of your treatment and safety. Be sure to make and go to all appointments, and call your doctor if you are having problems. It's also a good idea to know your test results and keep a list of the medicines you take. How can you care for yourself at home? · If your doctor put a splint on your finger, wear the splint as directed. Do not remove it until your doctor says you can. · You may need to change your activities to avoid movements that irritate the finger. · If your finger is swollen, put ice or a cold pack on your finger for 10 to 20 minutes at a time. Try to do this every 1 to 2 hours for the next 3 days (when you are awake) or until the swelling goes down. Put a thin cloth between the ice and your skin. · Prop up your hand on a pillow when you ice it or anytime you sit or lie down during the next 3 days. Try to keep it above the level of your heart. This will help reduce swelling. · Take your medicines exactly as prescribed. Call your doctor if you think you are having a problem with your medicine.   · Ask your doctor if you can take an over-the-counter pain medicine, such as acetaminophen (Tylenol), ibuprofen (Advil, Motrin), or naproxen (Aleve). Be safe with medicines. Read and follow all instructions on the label. · If your doctor recommends exercises, do them as directed. When should you call for help? Call your doctor now or seek immediate medical care if:  · Your finger locks in a bent position and will not straighten. Watch closely for changes in your health, and be sure to contact your doctor if:  · You do not get better as expected. Where can you learn more? Go to http://giselle-pradeep.info/. Enter M826 in the search box to learn more about \"Trigger Finger: Care Instructions. \"  Current as of: May 23, 2016  Content Version: 11.2  © 2364-8618 [employer]. Care instructions adapted under license by [employer] (which disclaims liability or warranty for this information). If you have questions about a medical condition or this instruction, always ask your healthcare professional. [address] any warranty or liability for your use of this information.
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
## 2017-06-14 ENCOUNTER — HOSPITAL ENCOUNTER (EMERGENCY)
Age: 52
Discharge: HOME | End: 2017-06-14
Attending: EMERGENCY MEDICINE
Payer: OTHER GOVERNMENT

## 2017-06-14 VITALS
SYSTOLIC BLOOD PRESSURE: 133 MMHG | BODY MASS INDEX: 28.51 KG/M2 | WEIGHT: 167 LBS | HEART RATE: 88 BPM | DIASTOLIC BLOOD PRESSURE: 90 MMHG | HEIGHT: 64 IN | TEMPERATURE: 98.3 F | RESPIRATION RATE: 16 BRPM | OXYGEN SATURATION: 98 %

## 2017-06-14 DIAGNOSIS — J01.40 ACUTE NON-RECURRENT PANSINUSITIS: Primary | ICD-10-CM

## 2017-06-14 PROCEDURE — 99282 EMERGENCY DEPT VISIT SF MDM: CPT

## 2017-06-14 RX ORDER — PREDNISONE 10 MG/1
TABLET ORAL
Qty: 21 TAB | Refills: 0 | Status: SHIPPED | OUTPATIENT
Start: 2017-06-14 | End: 2017-06-14

## 2017-06-14 RX ORDER — CLINDAMYCIN HYDROCHLORIDE 300 MG/1
300 CAPSULE ORAL 3 TIMES DAILY
Qty: 30 CAP | Refills: 0 | Status: SHIPPED | OUTPATIENT
Start: 2017-06-14 | End: 2017-06-24

## 2017-06-14 RX ORDER — CLINDAMYCIN HYDROCHLORIDE 300 MG/1
300 CAPSULE ORAL 3 TIMES DAILY
Qty: 30 CAP | Refills: 0 | Status: SHIPPED | OUTPATIENT
Start: 2017-06-14 | End: 2017-06-14

## 2017-06-14 RX ORDER — PREDNISONE 10 MG/1
TABLET ORAL
Qty: 21 TAB | Refills: 0 | Status: SHIPPED | OUTPATIENT
Start: 2017-06-14 | End: 2017-07-27 | Stop reason: ALTCHOICE

## 2017-06-14 NOTE — ED PROVIDER NOTES
HPI Comments: 48yoF to ED c/o sinus pain, green nasal drainage, posterior HA since Friday. States symptoms similar to previous sinus infection. Taking OTC medications w/o relief. Denies fever, chills, wheezing, CP, SOB or any other complaints. Pt states she did use her albuterol treatment today. Patient is a 45 y.o. female presenting with nasal congestion, sinus problems, and cough. The history is provided by the patient. Nasal Congestion   Associated symptoms include headaches. Sinus Infection    Associated symptoms include congestion, sinus pressure, cough and headaches. Cough   Associated symptoms include headaches. Past Medical History:   Diagnosis Date    Anemia     Asthma     BMI 34.0-34.9,adult 5/11/2017    Bowel obstruction (HCC)     Gall stones     GERD (gastroesophageal reflux disease)     History of blood transfusion     Hypertension     Sickle cell trait ([facility] [address]        Past Surgical History:   Procedure Laterality Date    ABDOMEN SURGERY PROC UNLISTED  2006    surgery for bowel obstruction    HX APPENDECTOMY      HX TUBAL LIGATION           Family History:   Problem Relation Age of Onset    Cancer Mother     Cancer Brother        Social History     Social History    Marital status:      Spouse name: N/A    Number of children: N/A    Years of education: N/A     Occupational History    Not on file. Social History Main Topics    Smoking status: Never Smoker    Smokeless tobacco: Not on file    Alcohol use No    Drug use: No    Sexual activity: Not on file     Other Topics Concern    Not on file     Social History Narrative         ALLERGIES: Iron; Other medication; Penicillins; Sulfa (sulfonamide antibiotics); and Venofer [iron sucrose]    Review of Systems   HENT: Positive for congestion and sinus pressure. Respiratory: Positive for cough. Neurological: Positive for headaches. All other systems reviewed and are negative.       There were no vitals filed for this visit. Physical Exam   Constitutional: She is oriented to person, place, and time. She appears well-developed. No distress. HENT:   Head: Normocephalic and atraumatic. Right Ear: Hearing, tympanic membrane, external ear and ear canal normal.   Left Ear: Hearing, tympanic membrane, external ear and ear canal normal.   Nose: Mucosal edema present. Right sinus exhibits maxillary sinus tenderness and frontal sinus tenderness. Left sinus exhibits maxillary sinus tenderness and frontal sinus tenderness. Mouth/Throat: Uvula is midline and oropharynx is clear and moist.   Eyes: Conjunctivae are normal.   Neck: Neck supple. Cardiovascular: Normal rate and regular rhythm. Pulmonary/Chest: Effort normal and breath sounds normal. She has no wheezes. Musculoskeletal: Normal range of motion. Neurological: She is alert and oriented to person, place, and time. Skin: Skin is warm and dry. MDM  Number of Diagnoses or Management Options  Acute non-recurrent pansinusitis:   Diagnosis management comments: Pt with sx of sinusitis. No wheezing but will put on taper of pred due to severity of sinusitis symptoms. rx for clinda for sinusitis.  ESTER Field 6:22 PM      Risk of Complications, Morbidity, and/or Mortality  Presenting problems: low  Diagnostic procedures: low  Management options: low    Patient Progress  Patient progress: stable    ED Course       Procedures

## 2017-06-14 NOTE — DISCHARGE INSTRUCTIONS
Saline Nasal Washes: Care Instructions  Your Care Instructions  Saline nasal washes help keep the nasal passages open by washing out thick or dried mucus. This simple remedy can help relieve symptoms of allergies, sinusitis, and colds. It also can make the nose feel more comfortable by keeping the mucous membranes moist. You may notice a little burning sensation in your nose the first few times you use the solution, but this usually gets better in a few days. Follow-up care is a key part of your treatment and safety. Be sure to make and go to all appointments, and call your doctor if you are having problems. It's also a good idea to know your test results and keep a list of the medicines you take. How can you care for yourself at home? · You can buy premixed saline solution in a squeeze bottle or other sinus rinse products at a drugstore. Read and follow the instructions on the label. · You also can make your own saline solution by adding 1 teaspoon of salt and 1 teaspoon of baking soda to 2 cups of distilled water. · If you use a homemade solution, pour a small amount into a clean bowl. Using a rubber bulb syringe, squeeze the syringe and place the tip in the salt water. Pull a small amount of the salt water into the syringe by relaxing your hand. · Sit down with your head tilted slightly back. Do not lie down. Put the tip of the bulb syringe or the squeeze bottle a little way into one of your nostrils. Gently drip or squirt a few drops into the nostril. Repeat with the other nostril. Some sneezing and gagging are normal at first.  · Gently blow your nose. · Wipe the syringe or bottle tip clean after each use. · Repeat this 2 or 3 times a day. · Use nasal washes gently if you have nosebleeds often. When should you call for help? Watch closely for changes in your health, and be sure to contact your doctor if:  · You often get nosebleeds. · You have problems doing the nasal washes.   Where can you learn more? Go to http://giselle-pradeep.info/. Enter [id number] in the search box to learn more about \"Saline Nasal Washes: Care Instructions. \"  Current as of: July 29, 2016  Content Version: 11.2  © 8081-4530 [employer]. Care instructions adapted under license by [employer] (which disclaims liability or warranty for this information). If you have questions about a medical condition or this instruction, always ask your healthcare professional. [address] any warranty or liability for your use of this information. Sinusitis: Care Instructions  Your Care Instructions    Sinusitis is an infection of the lining of the sinus cavities in your head. Sinusitis often follows a cold. It causes pain and pressure in your head and face. In most cases, sinusitis gets better on its own in 1 to 2 weeks. But some mild symptoms may last for several weeks. Sometimes antibiotics are needed. Follow-up care is a key part of your treatment and safety. Be sure to make and go to all appointments, and call your doctor if you are having problems. It's also a good idea to know your test results and keep a list of the medicines you take. How can you care for yourself at home? · Take an over-the-counter pain medicine, such as acetaminophen (Tylenol), ibuprofen (Advil, Motrin), or naproxen (Aleve). Read and follow all instructions on the label. · If the doctor prescribed antibiotics, take them as directed. Do not stop taking them just because you feel better. You need to take the full course of antibiotics. · Be careful when taking over-the-counter cold or flu medicines and Tylenol at the same time. Many of these medicines have acetaminophen, which is Tylenol. Read the labels to make sure that you are not taking more than the recommended dose. Too much acetaminophen (Tylenol) can be harmful.   · Breathe warm, moist air from a steamy shower, a hot bath, or a sink filled with hot water. Avoid cold, dry air. Using a humidifier in your home may help. Follow the directions for cleaning the machine. · Use saline (saltwater) nasal washes to help keep your nasal passages open and wash out mucus and bacteria. You can buy saline nose drops at a grocery store or drugstore. Or you can make your own at home by adding 1 teaspoon of salt and 1 teaspoon of baking soda to 2 cups of distilled water. If you make your own, fill a bulb syringe with the solution, insert the tip into your nostril, and squeeze gently. Alvarado Sloop your nose. · Put a hot, wet towel or a warm gel pack on your face 3 or 4 times a day for 5 to 10 minutes each time. · Try a decongestant nasal spray like oxymetazoline (Afrin). Do not use it for more than 3 days in a row. Using it for more than 3 days can make your congestion worse. When should you call for help? Call your doctor now or seek immediate medical care if:  · You have new or worse swelling or redness in your face or around your eyes. · You have a new or higher fever. Watch closely for changes in your health, and be sure to contact your doctor if:  · You have new or worse facial pain. · The mucus from your nose becomes thicker (like pus) or has new blood in it. · You are not getting better as expected. Where can you learn more? Go to http://giselle-pradeep.info/. Enter E176 in the search box to learn more about \"Sinusitis: Care Instructions. \"  Current as of: July 29, 2016  Content Version: 11.2  © 5759-3721 [employer]. Care instructions adapted under license by [employer] (which disclaims liability or warranty for this information). If you have questions about a medical condition or this instruction, always ask your healthcare professional. [address] any warranty or liability for your use of this information.

## 2017-07-27 ENCOUNTER — HOSPITAL ENCOUNTER (EMERGENCY)
Age: 52
Discharge: HOME | End: 2017-07-27
Attending: EMERGENCY MEDICINE
Payer: OTHER GOVERNMENT

## 2017-07-27 ENCOUNTER — APPOINTMENT (OUTPATIENT)
Dept: GENERAL RADIOLOGY | Age: 52
End: 2017-07-27
Attending: EMERGENCY MEDICINE
Payer: OTHER GOVERNMENT

## 2017-07-27 VITALS
SYSTOLIC BLOOD PRESSURE: 125 MMHG | TEMPERATURE: 99.8 F | HEART RATE: 99 BPM | OXYGEN SATURATION: 96 % | DIASTOLIC BLOOD PRESSURE: 85 MMHG | HEIGHT: 64 IN | BODY MASS INDEX: 29.02 KG/M2 | WEIGHT: 170 LBS | RESPIRATION RATE: 18 BRPM

## 2017-07-27 DIAGNOSIS — M54.50 ACUTE RIGHT-SIDED LOW BACK PAIN WITHOUT SCIATICA: Primary | ICD-10-CM

## 2017-07-27 LAB
APPEARANCE UR: CLEAR
BACTERIA URNS QL MICRO: NEGATIVE /HPF
BILIRUB UR QL: NEGATIVE
COLOR UR: YELLOW
EPITH CASTS URNS QL MICRO: NORMAL /LPF (ref 0–5)
GLUCOSE UR STRIP.AUTO-MCNC: NEGATIVE MG/DL
HGB UR QL STRIP: NEGATIVE
KETONES UR QL STRIP.AUTO: NEGATIVE MG/DL
LEUKOCYTE ESTERASE UR QL STRIP.AUTO: ABNORMAL
NITRITE UR QL STRIP.AUTO: NEGATIVE
PH UR STRIP: 6 [PH] (ref 5–8)
PROT UR STRIP-MCNC: NEGATIVE MG/DL
RBC #/AREA URNS HPF: NEGATIVE /HPF (ref 0–5)
SP GR UR REFRACTOMETRY: 1.01 (ref 1–1.03)
UROBILINOGEN UR QL STRIP.AUTO: 0.2 EU/DL (ref 0.2–1)
WBC URNS QL MICRO: NORMAL /HPF (ref 0–4)

## 2017-07-27 PROCEDURE — 99283 EMERGENCY DEPT VISIT LOW MDM: CPT

## 2017-07-27 PROCEDURE — 71020 XR CHEST PA LAT: CPT

## 2017-07-27 PROCEDURE — 81001 URINALYSIS AUTO W/SCOPE: CPT | Performed by: EMERGENCY MEDICINE

## 2017-07-27 RX ORDER — METHYLPREDNISOLONE 4 MG/1
TABLET ORAL
Qty: 1 DOSE PACK | Refills: 0 | Status: SHIPPED | OUTPATIENT
Start: 2017-07-27 | End: 2017-08-12

## 2017-07-27 RX ORDER — TRAMADOL HYDROCHLORIDE 50 MG/1
50 TABLET ORAL
Qty: 20 TAB | Refills: 0 | Status: SHIPPED | OUTPATIENT
Start: 2017-07-27 | End: 2017-08-12

## 2017-07-27 NOTE — ED PROVIDER NOTES
[facility]      47 y.o. female with a PMH of Asthma, HTN, and Obesity presents to the ED c/o right lower back/hip pain since yesterday morning. Pt states she works at [employer] and bends over, cutting and lifting about 100 boxes per day. Notes the pain began right after her shift. She has not taken anything for pain. Also notes a cough for the past few days, productive of clear sputum. Used her inhaler PTA with relief. Denies fever, chills, numbness, weakness, bowel/bladder function loss, abdominal pain, SOB, CP, or other symptoms at this time. No current facility-administered medications for this encounter. Current Outpatient Prescriptions   Medication Sig    methylPREDNISolone (MEDROL, CHARY,) 4 mg tablet Per dose pack instructions    traMADol (ULTRAM) 50 mg tablet Take 1 Tab by mouth every six (6) hours as needed for Pain. Max Daily Amount: 200 mg.    ondansetron (ZOFRAN ODT) 4 mg disintegrating tablet Take 1 Tab by mouth every eight (8) hours as needed for Nausea.  butalbital-acetaminophen-caff (FIORICET) -40 mg per capsule Take 1 Cap by mouth every six (6) hours as needed for Pain. Max Daily Amount: 4 Caps.  ascorbic acid (VITAMIN C) 500 mg tablet Take 500 mg by mouth daily.  potassium chloride (KLOR-CON) 20 mEq packet Take 20 mEq by mouth daily.  diphenhydrAMINE (BENADRYL) 25 mg capsule Take 25 mg by mouth every six (6) hours as needed.  fluticasone-salmeterol (ADVAIR DISKUS) 250-50 mcg/dose diskus inhaler Take 1 Puff by inhalation every twelve (12) hours.  ferrous sulfate (IRON) 325 mg (65 mg iron) tablet Take 1 Tab by mouth daily.  hydrOXYzine (VISTARIL) 25 mg capsule 1 tab every 6 hours as needed for itching.  triamcinolone acetonide (KENALOG) 0.1 % ointment Apply  to affected area two (2) times a day. use thin layer    ergocalciferol (VITAMIN D2) 50,000 unit capsule Take 1 Cap by mouth every seven (7) days.     CYANOCOBALAMIN, VITAMIN B-12, (VITAMIN B-12 PO) Take  by mouth.  naproxen sodium (ANAPROX DS) 550 mg tablet Take 1 Tab by mouth two (2) times daily (with meals). PRN pain    albuterol (PROVENTIL, VENTOLIN) 90 mcg/actuation inhaler Take 1-2 Puffs by inhalation every four (4) hours as needed for Wheezing. Past Medical History:   Diagnosis Date    Anemia     Asthma     BMI 34.0-34.9,adult 5/11/2017    Bowel obstruction (HCC)     Gall stones     GERD (gastroesophageal reflux disease)     History of blood transfusion     Hypertension     Sickle cell trait ([facility] [address]        Past Surgical History:   Procedure Laterality Date    ABDOMEN SURGERY PROC UNLISTED  2006    surgery for bowel obstruction    HX APPENDECTOMY      HX TUBAL LIGATION         Family History   Problem Relation Age of Onset    Cancer Mother     Cancer Brother        Social History     Social History    Marital status:      Spouse name: N/A    Number of children: N/A    Years of education: N/A     Occupational History    Not on file. Social History Main Topics    Smoking status: Never Smoker    Smokeless tobacco: Not on file    Alcohol use No    Drug use: No    Sexual activity: Not on file     Other Topics Concern    Not on file     Social History Narrative       Allergies   Allergen Reactions    Iron Anaphylaxis    Other Medication Nausea and Vomiting     All \"mycins\"    Penicillins Rash    Sulfa (Sulfonamide Antibiotics) Hives    Venofer [Iron Sucrose] Rash and Itching       Patient's primary care provider (as noted in EPIC):  None    Constitutional:  Denies malaise, fever, chills. ENMT:  Denies sore throat. Neck:  Denies injury or pain. Chest:  Denies pain, injury. Cardiac:  Denies chest pain or palpitations. Respiratory: + cough. Denies wheezing, difficulty breathing, shortness of breath. GI/ABD:  Denies injury, pain, distention, nausea, vomiting, diarrhea. :  Denies injury, pain, dysuria or urgency.    Back: + right lower back pain. Pelvis:  Denies injury or pain. Extremity/MS:  Denies injury or pain. Neuro:  Denies headache, LOC, dizziness, neurologic symptoms/deficits/paresthesias. Skin: Denies injury, rash, itching or skin changes. All other systems negative as reviewed. Visit Vitals    /85 (BP 1 Location: Left arm, BP Patient Position: At rest)    Pulse 99    Temp 99.8 °F (37.7 °C)    Resp 18    Ht 5' 4\" (1.626 m)    Wt 77.1 kg (170 lb)    SpO2 96%    BMI 29.18 kg/m2       PHYSICAL EXAM:  CONSTITUTIONAL:  Alert, in no apparent distress;  well developed;  well nourished. HEAD:  Normocephalic, atraumatic. EYES:  EOMI. Non-icteric sclera. Normal conjunctiva. ENTM:  Nose:  no rhinorrhea. Throat:  no erythema or exudate, mucous membranes moist.  NECK:  Supple  RESPIRATORY:  Coarse breath sounds to BLL, equal breath sounds, good air movement. Without wheezes or rales. CARDIOVASCULAR:  Regular rate and rhythm. No murmurs, rubs, or gallops. GI:  Normal bowel sounds, abdomen soft and non-tender. No rebound or guarding. BACK:  Right lower back with TTP about the SI joint;  paralumbar reproducible tenderness to palpation. No midline vertebral bony point tenderness or step-off. Normal bilateral straight leg raise. UPPER EXT:  Normal inspection. LOWER EXT:  No edema, no calf tenderness. Distal pulses intact. 5/5 strength and NVI to BLE. NEURO:  Moves all four extremities, and grossly normal motor exam.  SKIN:  No rashes;  Normal for age. PSYCH:  Alert and normal affect. DIFFERENTIAL DIAGNOSES/ MEDICAL DECISION MAKING:  Low back pain from myofascial strain/ sprain, muscle spasm, vertebral disc problem, neuropathy to include sciatica, abscess/infection, referred retroperitoneal pain from pyelonephritis or ureterolithiasis, other etiologies versus a combination of the above (example: myofascial strain/ sprain as well as muscle spasm).     IMPRESSION AND MEDICAL DECISION MAKING:  There is no signs of sciatica. No perianal decreased sensation nor bowel/bladder incontinence to suggest a neurological emergency. The patient does not have fever, no other signs of infection to suggest an epidural or other back abscess that would require emergent intervention. UA unremarkable. CXR: NAD as read by ESTER Guerra and Dr. Juana Cosme     Based upon the patients presentation with noted HPI and PE, along with the work up done in the emergency department, I believe that the patient is having paralumbar myofascial strain (lower back strain). Diagnosis:   1. Acute right-sided low back pain without sciatica      Disposition: Discharge    Follow-up Information     Follow up With Details Comments [facility] [address] [address] In 3 days  [address] [facility], [address] [facility]    [id number] [facility] EMERGENCY DEPT  If symptoms worsen [address] [id number] [phone]          Patient's Medications   Start Taking    METHYLPREDNISOLONE (MEDROL, CHARY,) 4 MG TABLET    Per dose pack instructions    TRAMADOL (ULTRAM) 50 MG TABLET    Take 1 Tab by mouth every six (6) hours as needed for Pain. Max Daily Amount: 200 mg. Continue Taking    ALBUTEROL (PROVENTIL, VENTOLIN) 90 MCG/ACTUATION INHALER    Take 1-2 Puffs by inhalation every four (4) hours as needed for Wheezing. ASCORBIC ACID (VITAMIN C) 500 MG TABLET    Take 500 mg by mouth daily. BUTALBITAL-ACETAMINOPHEN-CAFF (FIORICET) -40 MG PER CAPSULE    Take 1 Cap by mouth every six (6) hours as needed for Pain. Max Daily Amount: 4 Caps. CYANOCOBALAMIN, VITAMIN B-12, (VITAMIN B-12 PO)    Take  by mouth. DIPHENHYDRAMINE (BENADRYL) 25 MG CAPSULE    Take 25 mg by mouth every six (6) hours as needed. ERGOCALCIFEROL (VITAMIN D2) 50,000 UNIT CAPSULE    Take 1 Cap by mouth every seven (7) days.     FERROUS SULFATE (IRON) 325 MG (65 MG IRON) TABLET    Take 1 Tab by mouth daily.    FLUTICASONE-SALMETEROL (ADVAIR DISKUS) 250-50 MCG/DOSE DISKUS INHALER    Take 1 Puff by inhalation every twelve (12) hours. HYDROXYZINE (VISTARIL) 25 MG CAPSULE    1 tab every 6 hours as needed for itching. NAPROXEN SODIUM (ANAPROX DS) 550 MG TABLET    Take 1 Tab by mouth two (2) times daily (with meals). PRN pain    ONDANSETRON (ZOFRAN ODT) 4 MG DISINTEGRATING TABLET    Take 1 Tab by mouth every eight (8) hours as needed for Nausea. POTASSIUM CHLORIDE (KLOR-CON) 20 MEQ PACKET    Take 20 mEq by mouth daily. TRIAMCINOLONE ACETONIDE (KENALOG) 0.1 % OINTMENT    Apply  to affected area two (2) times a day.  use thin layer   These Medications have changed    No medications on file   Stop Taking    PREDNISONE (STERAPRED DS) 10 MG DOSE PACK    Per dose pack     ESTER Andrew

## 2017-07-27 NOTE — DISCHARGE INSTRUCTIONS
Back Pain: Care Instructions  Your Care Instructions    Back pain has many possible causes. It is often related to problems with muscles and ligaments of the back. It may also be related to problems with the nerves, discs, or bones of the back. Moving, lifting, standing, sitting, or sleeping in an awkward way can strain the back. Sometimes you don't notice the injury until later. Arthritis is another common cause of back pain. Although it may hurt a lot, back pain usually improves on its own within several weeks. Most people recover in 12 weeks or less. Using good home treatment and being careful not to stress your back can help you feel better sooner. Follow-up care is a key part of your treatment and safety. Be sure to make and go to all appointments, and call your doctor if you are having problems. Its also a good idea to know your test results and keep a list of the medicines you take. How can you care for yourself at home? · Sit or lie in positions that are most comfortable and reduce your pain. Try one of these positions when you lie down:  ¨ Lie on your back with your knees bent and supported by large pillows. ¨ Lie on the floor with your legs on the seat of a sofa or chair. Karn Miser on your side with your knees and hips bent and a pillow between your legs. ¨ Lie on your stomach if it does not make pain worse. · Do not sit up in bed, and avoid soft couches and twisted positions. Bed rest can help relieve pain at first, but it delays healing. Avoid bed rest after the first day of back pain. · Change positions every 30 minutes. If you must sit for long periods of time, take breaks from sitting. Get up and walk around, or lie in a comfortable position. · Try using a heating pad on a low or medium setting for 15 to 20 minutes every 2 or 3 hours. Try a warm shower in place of one session with the heating pad. · You can also try an ice pack for 10 to 15 minutes every 2 to 3 hours.  Put a thin cloth between the ice pack and your skin. · Take pain medicines exactly as directed. ¨ If the doctor gave you a prescription medicine for pain, take it as prescribed. ¨ If you are not taking a prescription pain medicine, ask your doctor if you can take an over-the-counter medicine. · Take short walks several times a day. You can start with 5 to 10 minutes, 3 or 4 times a day, and work up to longer walks. Walk on level surfaces and avoid hills and stairs until your back is better. · Return to work and other activities as soon as you can. Continued rest without activity is usually not good for your back. · To prevent future back pain, do exercises to stretch and strengthen your back and stomach. Learn how to use good posture, safe lifting techniques, and proper body mechanics. When should you call for help? Call your doctor now or seek immediate medical care if:  · You have new or worsening numbness in your legs. · You have new or worsening weakness in your legs. (This could make it hard to stand up.)  · You lose control of your bladder or bowels. Watch closely for changes in your health, and be sure to contact your doctor if:  · Your pain gets worse. · You are not getting better after 2 weeks. Where can you learn more? Go to http://giselle-pradeep.info/. Enter D563 in the search box to learn more about \"Back Pain: Care Instructions. \"  Current as of: March 21, 2017  Content Version: 11.3  © 7723-5198 [employer]. Care instructions adapted under license by [employer] (which disclaims liability or warranty for this information). If you have questions about a medical condition or this instruction, always ask your healthcare professional. [address] any warranty or liability for your use of this information. Low Back Pain: Exercises  Your Care Instructions  Here are some examples of typical rehabilitation exercises for your condition.  Start each exercise slowly. Ease off the exercise if you start to have pain. Your doctor or physical therapist will tell you when you can start these exercises and which ones will work best for you. How to do the exercises  Press-up    1. Lie on your stomach, supporting your body with your forearms. 2. Press your elbows down into the floor to raise your upper back. As you do this, relax your stomach muscles and allow your back to arch without using your back muscles. As your press up, do not let your hips or pelvis come off the floor. 3. Hold for 15 to 30 seconds, then relax. 4. Repeat 2 to 4 times. Alternate arm and leg (bird dog) exercise    Note: Do this exercise slowly. Try to keep your body straight at all times, and do not let one hip drop lower than the other. 1. Start on the floor, on your hands and knees. 2. Tighten your belly muscles. 3. Raise one leg off the floor, and hold it straight out behind you. Be careful not to let your hip drop down, because that will twist your trunk. 4. Hold for about 6 seconds, then lower your leg and switch to the other leg. 5. Repeat 8 to 12 times on each leg. 6. Over time, work up to holding for 10 to 30 seconds each time. 7. If you feel stable and secure with your leg raised, try raising the opposite arm straight out in front of you at the same time. Knee-to-chest exercise    1. Lie on your back with your knees bent and your feet flat on the floor. 2. Bring one knee to your chest, keeping the other foot flat on the floor (or keeping the other leg straight, whichever feels better on your lower back). 3. Keep your lower back pressed to the floor. Hold for at least 15 to 30 seconds. 4. Relax, and lower the knee to the starting position. 5. Repeat with the other leg. Repeat 2 to 4 times with each leg. 6. To get more stretch, put your other leg flat on the floor while pulling your knee to your chest.  Curl-ups    1.  Lie on the floor on your back with your knees bent at a 90-degree angle. Your feet should be flat on the floor, about 12 inches from your buttocks. 2. Cross your arms over your chest. If this bothers your neck, try putting your hands behind your neck (not your head), with your elbows spread apart. 3. Slowly tighten your belly muscles and raise your shoulder blades off the floor. 4. Keep your head in line with your body, and do not press your chin to your chest.  5. Hold this position for 1 or 2 seconds, then slowly lower yourself back down to the floor. 6. Repeat 8 to 12 times. Pelvic tilt exercise    1. Lie on your back with your knees bent. 2. \"Brace\" your stomach. This means to tighten your muscles by pulling in and imagining your belly button moving toward your spine. You should feel like your back is pressing to the floor and your hips and pelvis are rocking back. 3. Hold for about 6 seconds while you breathe smoothly. 4. Repeat 8 to 12 times. Heel dig bridging    1. Lie on your back with both knees bent and your ankles bent so that only your heels are digging into the floor. Your knees should be bent about 90 degrees. 2. Then push your heels into the floor, squeeze your buttocks, and lift your hips off the floor until your shoulders, hips, and knees are all in a straight line. 3. Hold for about 6 seconds as you continue to breathe normally, and then slowly lower your hips back down to the floor and rest for up to 10 seconds. 4. Do 8 to 12 repetitions. Hamstring stretch in doorway    1. Lie on your back in a doorway, with one leg through the open door. 2. Slide your leg up the wall to straighten your knee. You should feel a gentle stretch down the back of your leg. 3. Hold the stretch for at least 15 to 30 seconds. Do not arch your back, point your toes, or bend either knee. Keep one heel touching the floor and the other heel touching the wall. 4. Repeat with your other leg. 5. Do 2 to 4 times for each leg. Hip flexor stretch    1.  Kneel on the floor with one knee bent and one leg behind you. Place your forward knee over your foot. Keep your other knee touching the floor. 2. Slowly push your hips forward until you feel a stretch in the upper thigh of your rear leg. 3. Hold the stretch for at least 15 to 30 seconds. Repeat with your other leg. 4. Do 2 to 4 times on each side. Wall sit    1. Stand with your back 10 to 12 inches away from a wall. 2. Lean into the wall until your back is flat against it. 3. Slowly slide down until your knees are slightly bent, pressing your lower back into the wall. 4. Hold for about 6 seconds, then slide back up the wall. 5. Repeat 8 to 12 times. Follow-up care is a key part of your treatment and safety. Be sure to make and go to all appointments, and call your doctor if you are having problems. It's also a good idea to know your test results and keep a list of the medicines you take. Where can you learn more? Go to http://giselle-pradeep.info/. Enter W644 in the search box to learn more about \"Low Back Pain: Exercises. \"  Current as of: March 21, 2017  Content Version: 11.3  © 8174-7754 "[employer]. Care instructions adapted under license by [employer] (which disclaims liability or warranty for this information). If you have questions about a medical condition or this instruction, always ask your healthcare professional. [place] [address] any warranty or liability for your use of this information.

## 2017-07-27 NOTE — LETTER
[facility] EMERGENCY DEPT 
[address] [facility] [id number] [phone] Work/School Note Date: 7/27/2017 To Whom It May concern: 
 
Martín Law was seen and treated today in the emergency room by the following provider(s): 
Attending Provider: Valentina Atkins MD 
Physician Assistant: ESTER Ribera. Martín Law may return to work on 7/29/17. Sincerely, ESTER Ribera

## 2017-07-27 NOTE — ED TRIAGE NOTES
Patient states onset of right lower back pain and right hip pain on Wednesday morning after she got off work.

## 2017-09-14 ENCOUNTER — OFFICE VISIT (OUTPATIENT)
Dept: ORTHOPEDIC SURGERY | Facility: CLINIC | Age: 52
End: 2017-09-14

## 2017-09-14 VITALS
OXYGEN SATURATION: 97 % | SYSTOLIC BLOOD PRESSURE: 128 MMHG | BODY MASS INDEX: 34.11 KG/M2 | TEMPERATURE: 98.5 F | RESPIRATION RATE: 18 BRPM | DIASTOLIC BLOOD PRESSURE: 83 MMHG | HEIGHT: 64 IN | WEIGHT: 199.8 LBS | HEART RATE: 94 BPM

## 2017-09-14 DIAGNOSIS — D50.9 IRON DEFICIENCY ANEMIA, UNSPECIFIED IRON DEFICIENCY ANEMIA TYPE: ICD-10-CM

## 2017-09-14 DIAGNOSIS — M65.342 TRIGGER FINGER, LEFT RING FINGER: Primary | ICD-10-CM

## 2017-09-14 DIAGNOSIS — D57.3 SICKLE CELL TRAIT (HCC): ICD-10-CM

## 2017-09-14 DIAGNOSIS — M79.642 LEFT HAND PAIN: ICD-10-CM

## 2017-09-14 NOTE — MR AVS SNAPSHOT
Visit Information Date & Time Provider Department Dept. Phone Encounter #  
 9/14/2017  3:40 PM Quinn Srinivasan, 27 [facility] [place] [id number] [phone] Follow-up Instructions Return if symptoms worsen or fail to improve. Upcoming Health Maintenance Date Due Hepatitis C Screening 1965 PAP AKA CERVICAL CYTOLOGY 7/25/1985 BREAST CANCER SCRN MAMMOGRAM 7/25/2014 FOBT Q 1 YEAR AGE 50-75 7/25/2014 INFLUENZA AGE 9 TO ADULT 8/1/2017 DTaP/Tdap/Td series (2 - Td) 9/16/2024 Allergies as of 9/14/2017  Review Complete On: 9/14/2017 By: Carson Lynn Severity Noted Reaction Type Reactions Iron High 01/03/2013   Systemic Anaphylaxis Other Medication High 03/19/2015   Systemic Nausea and Vomiting All \"mycins\" Penicillins  01/03/2013   Topical Rash  
 Sulfa (Sulfonamide Antibiotics)  01/03/2013   Topical Hives Venofer [Iron Sucrose]  04/04/2013    Rash, Itching Current Immunizations  Reviewed on 8/11/2016 Name Date Tdap 9/16/2014  7:02 PM  
  
 Not reviewed this visit You Were Diagnosed With   
  
 Codes Comments Trigger finger, left ring finger    -  Primary ICD-10-CM: P75.518 ICD-9-CM: 727.03 Left hand pain     ICD-10-CM: I68.832 ICD-9-CM: 729.5 Sick[facility] ([facility]     ICD-10-CM: D57.3 ICD-9-CM: 282.5 Iron deficiency anemia, unspecified iron deficiency anemia type     ICD-10-CM: D50.9 ICD-9-CM: 280.9 Vitals BP Pulse Temp Resp Height(growth percentile) Weight(growth percentile) 128/83 94 98.5 °F (36.9 °C) (Oral) 18 5' 4\" (1.626 m) 199 lb 12.8 oz (90.6 kg) SpO2 BMI OB Status Smoking Status 97% 34.3 kg/m2 Menopause Never Smoker BMI and BSA Data Body Mass Index Body Surface Area  
 34.3 kg/m 2 2.02 m 2 Preferred Pharmacy Pharmacy Name Phone NO PHARMACY PREFERENCE Your Updated Medication List  
  
   
 This list is accurate as of: 9/14/17  4:11 PM.  Always use your most recent med list.  
  
  
  
  
 ADVAIR DISKUS 250-50 mcg/dose diskus inhaler Generic drug:  fluticasone-salmeterol Take 1 Puff by inhalation every twelve (12) hours. albuterol 90 mcg/actuation inhaler Commonly known as:  Agnieszka Braulio Take 1-2 Puffs by inhalation every four (4) hours as needed for Wheezing. ascorbic acid (vitamin C) 500 mg tablet Commonly known as:  VITAMIN C Take 500 mg by mouth daily. BENADRYL 25 mg capsule Generic drug:  diphenhydrAMINE Take 25 mg by mouth every six (6) hours as needed. butalbital-acetaminophen-caff -40 mg per capsule Commonly known as:  [employer] Take 1 Cap by mouth every six (6) hours as needed for Pain. Max Daily Amount: 4 Caps. cyclobenzaprine 10 mg tablet Commonly known as:  FLEXERIL Take 1 Tab by mouth three (3) times daily as needed for Muscle Spasm(s). ergocalciferol 50,000 unit capsule Commonly known as:  VITAMIN D2 Take 1 Cap by mouth every seven (7) days. naproxen sodium 550 mg tablet Commonly known as:  Irl [employer]ck DS Take 1 Tab by mouth two (2) times daily (with meals). PRN pain  
  
 ondansetron 4 mg disintegrating tablet Commonly known as:  ZOFRAN ODT Take 1 Tab by mouth every eight (8) hours as needed for Nausea. potassium chloride 20 mEq packet Commonly known as:  KLOR-CON Take 20 mEq by mouth daily. triamcinolone acetonide 0.1 % ointment Commonly known as:  KENALOG Apply  to affected area two (2) times a day. use thin layer VITAMIN B-12 PO Take  by mouth. Follow-up Instructions Return if symptoms worsen or fail to improve. Patient Instructions Trigger Finger Release: Before Your Surgery What is trigger finger release?  
Trigger finger release is surgery to make it easier to bend and straighten your finger. Your doctor will make a cut in the tissue over the tendon that helps bend your finger. This cut is called an incision. It will allow the tendon to move freely without pain. This surgery will probably be done while you are awake. The doctor will give you a shot (injection) to numb your hand and prevent pain. You also may get medicine to help you relax. The doctor will make an incision in the skin of your finger or palm. He or she will make a cut to open the tissue over the swollen part of the tendon. The doctor will close the skin incision with stitches. After surgery, you will have a small scar on your finger or palm. This will fade with time. It will probably take about 6 weeks for your hand to heal. After it heals, your finger may move easily without pain. You will go home the same day as the surgery. How soon you can return to work depends on your job. If you can do your job without using your hand, you may be able to go back in 1 or 2 days. But if your job requires you to do repeated finger or hand movements, put pressure on your hand, or lift things, you may need to take more time off work. Follow-up care is a key part of your treatment and safety. Be sure to make and go to all appointments, and call your doctor if you are having problems. It's also a good idea to know your test results and keep a list of the medicines you take. What happens before surgery? Surgery can be stressful. This information will help you understand what you can expect. And it will help you safely prepare for surgery. Preparing for surgery · Understand exactly what surgery is planned, along with the risks, benefits, and other options. · Tell your doctors ALL the medicines, vitamins, supplements, and herbal remedies you take. Some of these can increase the risk of bleeding or interact with anesthesia. · If you take blood thinners, such as warfarin (Coumadin), clopidogrel (Plavix), or aspirin, be sure to talk to your doctor. He or she will tell you if you should stop taking these medicines before your surgery. Make sure that you understand exactly what your doctor wants you to do. · Your doctor will tell you which medicines to take or stop before your surgery. You may need to stop taking certain medicines a week or more before surgery. So talk to your doctor as soon as you can. · If you have an advance directive, let your doctor know. It may include a living will and a durable power of  for health care. Bring a copy to the hospital. If you don't have one, you may want to prepare one. It lets your doctor and loved ones know your health care wishes. Doctors advise that everyone prepare these papers before any type of surgery or procedure. What happens on the day of surgery? · Follow the instructions exactly about when to stop eating and drinking. If you don't, your surgery may be canceled. If your doctor told you to take your medicines on the day of surgery, take them with only a sip of water. · Take a bath or shower before you come in for your surgery. Do not apply lotions, perfumes, deodorants, or nail polish. · Do not shave the surgical site yourself. · Take off all jewelry and piercings. And take out contact lenses, if you wear them. At the hospital or surgery center · Bring a picture ID. · The area for surgery is often marked to make sure there are no errors. · You will be kept comfortable and safe by your anesthesia provider. You may get medicine that relaxes you or puts you in a light sleep. The area being worked on will be numb. · The surgery will take less than 1 hour. Going home · Be sure you have someone to drive you home. Anesthesia and pain medicine make it unsafe for you to drive. · You will be given more specific instructions about recovering from your surgery.  They will cover things like diet, wound care, follow-up care, driving, and getting back to your normal routine. When should you call your doctor? · You have questions or concerns. · You don't understand how to prepare for your surgery. · You become ill before the surgery (such as fever, flu, or a cold). · You need to reschedule or have changed your mind about having the surgery. Where can you learn more? Go to http://giselle-pradeep.info/. Enter F208 in the search box to learn more about \"Trigger Finger Release: Before Your Surgery. \" Current as of: March 21, 2017 Content Version: 11.3 © 9612-6906 [employer]. Care instructions adapted under license by [employer] (which disclaims liability or warranty for this information). If you have questions about a medical condition or this instruction, always ask your healthcare professional. [address] any warranty or liability for your use of this information. Introducing \[facility]\"" & HEALTH SERVICES! Vince Murphy introduces [employer] patient portal. Now you can access parts of your medical record, email your doctor's office, and request medication refills online. 1. In your internet browser, go to [email or web address] 2. Click on the First Time User? Click Here link in the Sign In box. You will see the New Member Sign Up page. 3. Enter your [employer] Access Code exactly as it appears below. You will not need to use this code after youve completed the sign-up process. If you do not sign up before the expiration date, you must request a new code. · [employer] Access Code: [email or web address] Expires: 10/25/2017  6:49 PM 
 
4. Enter the last four digits of your Social Security Number (xxxx) and Date of Birth (mm/dd/yyyy) as indicated and click Submit. You will be taken to the next sign-up page. 5. Create a [employer] ID. This will be your [employer] login ID and cannot be changed, so think of one that is secure and easy to remember. 6. Create a [employer] password. You can change your password at any time. 7. Enter your Password Reset Question and Answer. This can be used at a later time if you forget your password. 8. Enter your e-mail address. You will receive e-mail notification when new information is available in [address] 9. Click Sign Up. You can now view and download portions of your medical record. 10. Click the Download Summary menu link to download a portable copy of your medical information. If you have questions, please visit the Frequently Asked Questions section of the [employer] website. Remember, [employer] is NOT to be used for urgent needs. For medical emergencies, dial 911. Now available from your iPhone and Android! Please provide this summary of care documentation to your next provider. Your primary care clinician is listed as NONE. If you have any questions after today's visit, please call [phone].
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
## 2017-09-14 NOTE — PATIENT INSTRUCTIONS
Trigger Finger Release: Before Your Surgery  What is trigger finger release? Trigger finger release is surgery to make it easier to bend and straighten your finger. Your doctor will make a cut in the tissue over the tendon that helps bend your finger. This cut is called an incision. It will allow the tendon to move freely without pain. This surgery will probably be done while you are awake. The doctor will give you a shot (injection) to numb your hand and prevent pain. You also may get medicine to help you relax. The doctor will make an incision in the skin of your finger or palm. He or she will make a cut to open the tissue over the swollen part of the tendon. The doctor will close the skin incision with stitches. After surgery, you will have a small scar on your finger or palm. This will fade with time. It will probably take about 6 weeks for your hand to heal. After it heals, your finger may move easily without pain. You will go home the same day as the surgery. How soon you can return to work depends on your job. If you can do your job without using your hand, you may be able to go back in 1 or 2 days. But if your job requires you to do repeated finger or hand movements, put pressure on your hand, or lift things, you may need to take more time off work. Follow-up care is a key part of your treatment and safety. Be sure to make and go to all appointments, and call your doctor if you are having problems. It's also a good idea to know your test results and keep a list of the medicines you take. What happens before surgery? Surgery can be stressful. This information will help you understand what you can expect. And it will help you safely prepare for surgery. Preparing for surgery  · Understand exactly what surgery is planned, along with the risks, benefits, and other options. · Tell your doctors ALL the medicines, vitamins, supplements, and herbal remedies you take.  Some of these can increase the risk of bleeding or interact with anesthesia. · If you take blood thinners, such as warfarin (Coumadin), clopidogrel (Plavix), or aspirin, be sure to talk to your doctor. He or she will tell you if you should stop taking these medicines before your surgery. Make sure that you understand exactly what your doctor wants you to do. · Your doctor will tell you which medicines to take or stop before your surgery. You may need to stop taking certain medicines a week or more before surgery. So talk to your doctor as soon as you can. · If you have an advance directive, let your doctor know. It may include a living will and a durable power of  for health care. Bring a copy to the hospital. If you don't have one, you may want to prepare one. It lets your doctor and loved ones know your health care wishes. Doctors advise that everyone prepare these papers before any type of surgery or procedure. What happens on the day of surgery? · Follow the instructions exactly about when to stop eating and drinking. If you don't, your surgery may be canceled. If your doctor told you to take your medicines on the day of surgery, take them with only a sip of water. · Take a bath or shower before you come in for your surgery. Do not apply lotions, perfumes, deodorants, or nail polish. · Do not shave the surgical site yourself. · Take off all jewelry and piercings. And take out contact lenses, if you wear them. At the hospital or surgery center  · Bring a picture ID. · The area for surgery is often marked to make sure there are no errors. · You will be kept comfortable and safe by your anesthesia provider. You may get medicine that relaxes you or puts you in a light sleep. The area being worked on will be numb. · The surgery will take less than 1 hour. Going home  · Be sure you have someone to drive you home. Anesthesia and pain medicine make it unsafe for you to drive.   · You will be given more specific instructions about recovering from your surgery. They will cover things like diet, wound care, follow-up care, driving, and getting back to your normal routine. When should you call your doctor? · You have questions or concerns. · You don't understand how to prepare for your surgery. · You become ill before the surgery (such as fever, flu, or a cold). · You need to reschedule or have changed your mind about having the surgery. Where can you learn more? Go to http://giselle-pradeep.info/. Enter F208 in the search box to learn more about \"Trigger Finger Release: Before Your Surgery. \"  Current as of: March 21, 2017  Content Version: 11.3  © 5453-5717 [employer]. Care instructions adapted under license by [employer] (which disclaims liability or warranty for this information). If you have questions about a medical condition or this instruction, always ask your healthcare professional. [address] any warranty or liability for your use of this information.

## 2017-09-28 DIAGNOSIS — Z01.810 PRE-OPERATIVE CARDIOVASCULAR EXAMINATION: ICD-10-CM

## 2017-09-28 DIAGNOSIS — M65.342 TRIGGER RING FINGER OF LEFT HAND: Primary | ICD-10-CM

## 2017-09-28 DIAGNOSIS — Z01.812 BLOOD TESTS PRIOR TO TREATMENT OR PROCEDURE: ICD-10-CM

## 2017-09-29 DIAGNOSIS — M65.342 TRIGGER RING FINGER OF LEFT HAND: ICD-10-CM

## 2017-09-29 DIAGNOSIS — Z01.812 BLOOD TESTS PRIOR TO TREATMENT OR PROCEDURE: ICD-10-CM

## 2017-10-02 ENCOUNTER — HOSPITAL ENCOUNTER (OUTPATIENT)
Dept: PREADMISSION TESTING | Age: 52
Discharge: HOME | End: 2017-10-02
Payer: OTHER GOVERNMENT

## 2017-10-02 DIAGNOSIS — M65.342 TRIGGER RING FINGER OF LEFT HAND: ICD-10-CM

## 2017-10-02 DIAGNOSIS — Z01.812 BLOOD TESTS PRIOR TO TREATMENT OR PROCEDURE: ICD-10-CM

## 2017-10-02 DIAGNOSIS — Z01.810 PRE-OPERATIVE CARDIOVASCULAR EXAMINATION: ICD-10-CM

## 2017-10-02 LAB
ANION GAP SERPL CALC-SCNC: 9 MMOL/L (ref 3–18)
BASOPHILS # BLD: 0 K/UL (ref 0–0.1)
BASOPHILS NFR BLD: 0 % (ref 0–2)
BUN SERPL-MCNC: 11 MG/DL (ref 7–18)
BUN/CREAT SERPL: 17 (ref 12–20)
CALCIUM SERPL-MCNC: 8.7 MG/DL (ref 8.5–10.1)
CHLORIDE SERPL-SCNC: 104 MMOL/L (ref 100–108)
CO2 SERPL-SCNC: 26 MMOL/L (ref 21–32)
CREAT SERPL-MCNC: 0.63 MG/DL (ref 0.6–1.3)
DIFFERENTIAL METHOD BLD: NORMAL
EOSINOPHIL # BLD: 0.1 K/UL (ref 0–0.4)
EOSINOPHIL NFR BLD: 2 % (ref 0–5)
ERYTHROCYTE [DISTWIDTH] IN BLOOD BY AUTOMATED COUNT: 14.2 % (ref 11.6–14.5)
EST. AVERAGE GLUCOSE BLD GHB EST-MCNC: 114 MG/DL
GLUCOSE SERPL-MCNC: 75 MG/DL (ref 74–99)
HBA1C MFR BLD: 5.6 % (ref 4.2–5.6)
HCT VFR BLD AUTO: 38.9 % (ref 35–45)
HGB BLD-MCNC: 13.3 G/DL (ref 12–16)
LYMPHOCYTES # BLD: 2 K/UL (ref 0.9–3.6)
LYMPHOCYTES NFR BLD: 39 % (ref 21–52)
MCH RBC QN AUTO: 27.5 PG (ref 24–34)
MCHC RBC AUTO-ENTMCNC: 34.2 G/DL (ref 31–37)
MCV RBC AUTO: 80.4 FL (ref 74–97)
MONOCYTES # BLD: 0.4 K/UL (ref 0.05–1.2)
MONOCYTES NFR BLD: 9 % (ref 3–10)
NEUTS SEG # BLD: 2.6 K/UL (ref 1.8–8)
NEUTS SEG NFR BLD: 50 % (ref 40–73)
PLATELET # BLD AUTO: 247 K/UL (ref 135–420)
PMV BLD AUTO: 10.7 FL (ref 9.2–11.8)
POTASSIUM SERPL-SCNC: 4.1 MMOL/L (ref 3.5–5.5)
RBC # BLD AUTO: 4.84 M/UL (ref 4.2–5.3)
SODIUM SERPL-SCNC: 139 MMOL/L (ref 136–145)
WBC # BLD AUTO: 5.2 K/UL (ref 4.6–13.2)

## 2017-10-02 PROCEDURE — 83036 HEMOGLOBIN GLYCOSYLATED A1C: CPT | Performed by: PHYSICIAN ASSISTANT

## 2017-10-02 PROCEDURE — 36415 COLL VENOUS BLD VENIPUNCTURE: CPT | Performed by: PHYSICIAN ASSISTANT

## 2017-10-02 PROCEDURE — 93005 ELECTROCARDIOGRAM TRACING: CPT

## 2017-10-02 PROCEDURE — 85025 COMPLETE CBC W/AUTO DIFF WBC: CPT | Performed by: PHYSICIAN ASSISTANT

## 2017-10-02 PROCEDURE — 80048 BASIC METABOLIC PNL TOTAL CA: CPT | Performed by: PHYSICIAN ASSISTANT

## 2017-10-03 LAB
ATRIAL RATE: 84 BPM
CALCULATED P AXIS, ECG09: 56 DEGREES
CALCULATED R AXIS, ECG10: 20 DEGREES
CALCULATED T AXIS, ECG11: 48 DEGREES
DIAGNOSIS, 93000: NORMAL
P-R INTERVAL, ECG05: 158 MS
Q-T INTERVAL, ECG07: 400 MS
QRS DURATION, ECG06: 76 MS
QTC CALCULATION (BEZET), ECG08: 472 MS
VENTRICULAR RATE, ECG03: 84 BPM

## 2017-10-10 ENCOUNTER — OFFICE VISIT (OUTPATIENT)
Dept: ORTHOPEDIC SURGERY | Facility: CLINIC | Age: 52
End: 2017-10-10

## 2017-10-10 VITALS
WEIGHT: 199.6 LBS | TEMPERATURE: 96.9 F | HEIGHT: 64 IN | OXYGEN SATURATION: 97 % | BODY MASS INDEX: 34.08 KG/M2 | HEART RATE: 99 BPM | RESPIRATION RATE: 15 BRPM | DIASTOLIC BLOOD PRESSURE: 78 MMHG | SYSTOLIC BLOOD PRESSURE: 129 MMHG

## 2017-10-10 DIAGNOSIS — M65.342 TRIGGER FINGER, LEFT RING FINGER: Primary | ICD-10-CM

## 2017-10-10 DIAGNOSIS — Z01.818 PREOP GENERAL PHYSICAL EXAM: ICD-10-CM

## 2017-10-10 RX ORDER — OXYCODONE AND ACETAMINOPHEN 5; 325 MG/1; MG/1
1 TABLET ORAL
Qty: 44 TAB | Refills: 0 | Status: SHIPPED | OUTPATIENT
Start: 2017-10-10 | End: 2017-10-16

## 2017-10-10 NOTE — PROGRESS NOTES
Marichuy Joy returns for History and Physical in preparation of upcoming left ring finger open trigger release. Please see the attached forms in Epic for History, Physical, and Review of systems.

## 2017-10-10 NOTE — H&P
History and Physical         47 year AA female seen in preparation for left open ring finger trigger release  Review of Systems   Constitutional: Positive for activity change (decreased use left hand ring finger secondary to locking). Negative for chills, fatigue and fever. HENT: Negative for ear pain. Eyes: Negative for pain. Respiratory: Negative for shortness of breath. Cardiovascular: Negative. Endocrine: Negative. Genitourinary: Negative for flank pain. Musculoskeletal: Positive for arthralgias, joint swelling and myalgias. Skin: Negative. Allergic/Immunologic: Negative. Neurological: Negative. Hematological: Negative. Psychiatric/Behavioral: Negative. Physical Exam   Nursing note and vitals reviewed. Constitutional: She is oriented to person, place, and time. She appears well-developed and well-nourished. HENT:   Head: Normocephalic and atraumatic. Eyes: Conjunctivae and EOM are normal. Pupils are equal, round, and reactive to light. Neck: Normal range of motion. Cardiovascular: Normal rate, regular rhythm, normal heart sounds and intact distal pulses. Pulmonary/Chest: Effort normal and breath sounds normal.   Musculoskeletal:        Hands:  Neurological: She is alert and oriented to person, place, and time. Skin: Skin is warm and dry. Psychiatric: She has a normal mood and affect. Her behavior is normal. Judgment and thought content normal.     Left ring finger triggering    Left ring finger open trigger release    Risk / Benefit: Surgeon will discuss in \"face to face\" encounter on day of surgery.

## 2017-10-12 ENCOUNTER — OFFICE VISIT (OUTPATIENT)
Dept: ORTHOPEDIC SURGERY | Facility: CLINIC | Age: 52
End: 2017-10-12

## 2017-10-12 VITALS
SYSTOLIC BLOOD PRESSURE: 129 MMHG | DIASTOLIC BLOOD PRESSURE: 75 MMHG | HEART RATE: 73 BPM | HEIGHT: 64 IN | TEMPERATURE: 97.7 F | RESPIRATION RATE: 18 BRPM | WEIGHT: 200.6 LBS | BODY MASS INDEX: 34.25 KG/M2 | OXYGEN SATURATION: 100 %

## 2017-10-12 DIAGNOSIS — Z98.890 S/P TRIGGER FINGER RELEASE: Primary | ICD-10-CM

## 2017-10-12 NOTE — LETTER
NOTIFICATION RETURN TO WORK / SCHOOL 
 
10/12/2017 3:29 PM 
 
Ms. Che Sylvester [place] [phone] To Whom It May Concern: 
 
Che Sylvester is currently under the care of [address] [place] [address]. She will return to work 10-21-17. If there are questions or concerns please have the patient contact our office.  
 
 
 
Sincerely, 
 
 
Wesley Martinez PA-C

## 2017-10-12 NOTE — MR AVS SNAPSHOT
Visit Information Date & Time Provider Department Dept. Phone Encounter #  
 10/12/2017  2:45 PM Josette Sanabria, [address] [facility] [place] [address] [id number] Upcoming Health Maintenance Date Due Hepatitis C Screening 1965 PAP AKA CERVICAL CYTOLOGY 7/25/1985 BREAST CANCER SCRN MAMMOGRAM 7/25/2014 FOBT Q 1 YEAR AGE 50-75 7/25/2014 INFLUENZA AGE 9 TO ADULT 8/1/2017 DTaP/Tdap/Td series (2 - Td) 9/16/2024 Allergies as of 10/12/2017  Review Complete On: 10/12/2017 By: Stephen Garcias Severity Noted Reaction Type Reactions Iron High 01/03/2013   Systemic Anaphylaxis Other Medication High 03/19/2015   Systemic Nausea and Vomiting All \"mycins\" Penicillins  01/03/2013   Topical Rash  
 Sulfa (Sulfonamide Antibiotics)  01/03/2013   Topical Hives Venofer [Iron Sucrose]  04/04/2013    Rash, Itching Current Immunizations  Reviewed on 8/11/2016 Name Date Tdap 9/16/2014  7:02 PM  
  
 Not reviewed this visit Vitals BP Pulse Temp Resp Height(growth percentile) Weight(growth percentile) 129/75 73 97.7 °F (36.5 °C) (Oral) 18 5' 4\" (1.626 m) 200 lb 9.6 oz (91 kg) SpO2 BMI OB Status Smoking Status 100% 34.43 kg/m2 Menopause Never Smoker BMI and BSA Data Body Mass Index Body Surface Area 34.43 kg/m 2 2.03 m 2 Preferred Pharmacy Pharmacy Name Phone NO PHARMACY PREFERENCE Your Updated Medication List  
  
   
This list is accurate as of: 10/12/17  3:12 PM.  Always use your most recent med list.  
  
  
  
  
 Jamia Lim 250-50 mcg/dose diskus inhaler Generic drug:  fluticasone-salmeterol Take 1 Puff by inhalation every twelve (12) hours. albuterol 90 mcg/actuation inhaler Commonly known as:  Marguerite Pa Take 1-2 Puffs by inhalation every four (4) hours as needed for Wheezing. ascorbic acid (vitamin C) 500 mg tablet Commonly known as:  VITAMIN C Take 500 mg by mouth daily. BENADRYL 25 mg capsule Generic drug:  diphenhydrAMINE Take 25 mg by mouth every six (6) hours as needed. butalbital-acetaminophen-caff -40 mg per capsule Commonly known as:  [employer] Take 1 Cap by mouth every six (6) hours as needed for Pain. Max Daily Amount: 4 Caps. cyclobenzaprine 10 mg tablet Commonly known as:  FLEXERIL Take 1 Tab by mouth three (3) times daily as needed for Muscle Spasm(s). ergocalciferol 50,000 unit capsule Commonly known as:  VITAMIN D2 Take 1 Cap by mouth every seven (7) days. naproxen sodium 550 mg tablet Commonly known as:  [employer] DS Take 1 Tab by mouth two (2) times daily (with meals). PRN pain  
  
 ondansetron 4 mg disintegrating tablet Commonly known as:  ZOFRAN ODT Take 1 Tab by mouth every eight (8) hours as needed for Nausea. oxyCODONE-acetaminophen 5-325 mg per tablet Commonly known as:  PERCOCET Take 1 Tab by mouth every six (6) hours as needed for Pain. Max Daily Amount: 4 Tabs. Maximum 6 tablets per 24 hours!!! Indications: Pain  
  
 potassium chloride 20 mEq packet Commonly known as:  KLOR-CON Take 20 mEq by mouth daily. triamcinolone acetonide 0.1 % ointment Commonly known as:  KENALOG Apply  to affected area two (2) times a day. use thin layer VITAMIN B-12 PO Take  by mouth. Introducing [facility]! Viviana Ji introduces [employer] patient portal. Now you can access parts of your medical record, email your doctor's office, and request medication refills online. 1. In your internet browser, go to [email or web address] 2. Click on the First Time User? Click Here link in the Sign In box. You will see the New Member Sign Up page. 3. Enter your [employer] Access Code exactly as it appears below. You will not need to use this code after youve completed the sign-up process.  If you do not sign up before the expiration date, you must request a new code. · [employer] Access Code: [email or web address] Expires: 10/25/2017  6:49 PM 
 
4. Enter the last four digits of your Social Security Number (xxxx) and Date of Birth (mm/dd/yyyy) as indicated and click Submit. You will be taken to the next sign-up page. 5. Create a [employer] ID. This will be your [employer] login ID and cannot be changed, so think of one that is secure and easy to remember. 6. Create a [employer] password. You can change your password at any time. 7. Enter your Password Reset Question and Answer. This can be used at a later time if you forget your password. 8. Enter your e-mail address. You will receive e-mail notification when new information is available in [address] 9. Click Sign Up. You can now view and download portions of your medical record. 10. Click the Download Summary menu link to download a portable copy of your medical information. If you have questions, please visit the Frequently Asked Questions section of the [employer] website. Remember, [employer] is NOT to be used for urgent needs. For medical emergencies, dial 911. Now available from your iPhone and Android! Please provide this summary of care documentation to your next provider. Your primary care clinician is listed as [facility]. If you have any questions after today's visit, please call [phone].

## 2017-10-12 NOTE — LETTER
NOTIFICATION RETURN TO WORK / SCHOOL 
 
10/12/2017 3:29 PM 
 
Ms. Arianna Franco [place] [phone] To Whom It May Concern: 
 
Arianna Franco is currently under the care of [address] [place] [address]. She will return to work on 10-20-17. She will remain out of work until then. If there are questions or concerns please have the patient contact our office.  
 
 
 
Sincerely, 
 
 
Anaya Arzola PA-C

## 2017-10-12 NOTE — PROGRESS NOTES
HISTORY OF PRESENT ILLNESS:  Vania Lizama returns. She is one day status post her left hand ring finger trigger release, open. She is doing generally well. She has minimal pain at the surgical site. PHYSICAL EXAM:  The wound site is transverse across the volar aspect of the left hand 2 cm proximal from the ring finger metacarpophalangeal joints. The wound, from pole to pole, measures 2.5 cm in length. The patient has swelling associated bracketing the site. There is dried, bloody drainage also noted, as well as Methylene blue marker staining. The passive flexion of the left ring finger noted is guarded today secondary to some stiffness with pain reproduced. Distal sensation is intact fully to the left upper extremity. PLAN:   We will see her back in about one week. A bulky soft dressing is placed today. She is going to avoid getting the site wet. Today, all her questions were answered to her satisfaction. A copy of her OP report was reviewed and provided.

## 2017-10-16 ENCOUNTER — HOSPITAL ENCOUNTER (EMERGENCY)
Age: 52
Discharge: HOME | End: 2017-10-16
Attending: EMERGENCY MEDICINE
Payer: OTHER GOVERNMENT

## 2017-10-16 ENCOUNTER — APPOINTMENT (OUTPATIENT)
Dept: CT IMAGING | Age: 52
End: 2017-10-16
Attending: PHYSICIAN ASSISTANT
Payer: OTHER GOVERNMENT

## 2017-10-16 VITALS
RESPIRATION RATE: 24 BRPM | SYSTOLIC BLOOD PRESSURE: 158 MMHG | WEIGHT: 176 LBS | DIASTOLIC BLOOD PRESSURE: 90 MMHG | OXYGEN SATURATION: 94 % | TEMPERATURE: 98.3 F | BODY MASS INDEX: 30.05 KG/M2 | HEART RATE: 86 BPM | HEIGHT: 64 IN

## 2017-10-16 DIAGNOSIS — E87.5 ACUTE HYPERKALEMIA: ICD-10-CM

## 2017-10-16 DIAGNOSIS — R19.7 VOMITING AND DIARRHEA: ICD-10-CM

## 2017-10-16 DIAGNOSIS — R79.89 ELEVATED LFTS: ICD-10-CM

## 2017-10-16 DIAGNOSIS — R11.10 VOMITING AND DIARRHEA: ICD-10-CM

## 2017-10-16 DIAGNOSIS — R10.30 ABDOMINAL PAIN, LOWER: Primary | ICD-10-CM

## 2017-10-16 LAB
ALBUMIN SERPL-MCNC: 3.7 G/DL (ref 3.4–5)
ALBUMIN/GLOB SERPL: 0.8 {RATIO} (ref 0.8–1.7)
ALP SERPL-CCNC: 165 U/L (ref 45–117)
ALT SERPL-CCNC: 79 U/L (ref 13–56)
ANION GAP SERPL CALC-SCNC: 10 MMOL/L (ref 3–18)
APPEARANCE UR: CLEAR
AST SERPL-CCNC: 182 U/L (ref 15–37)
BASOPHILS # BLD: 0 K/UL (ref 0–0.06)
BASOPHILS NFR BLD: 0 % (ref 0–2)
BILIRUB SERPL-MCNC: 0.9 MG/DL (ref 0.2–1)
BILIRUB UR QL: NEGATIVE
BUN SERPL-MCNC: 9 MG/DL (ref 7–18)
BUN/CREAT SERPL: 17 (ref 12–20)
CALCIUM SERPL-MCNC: 9.2 MG/DL (ref 8.5–10.1)
CHLORIDE SERPL-SCNC: 106 MMOL/L (ref 100–108)
CO2 SERPL-SCNC: 26 MMOL/L (ref 21–32)
COLOR UR: YELLOW
CREAT SERPL-MCNC: 0.52 MG/DL (ref 0.6–1.3)
DIFFERENTIAL METHOD BLD: ABNORMAL
EOSINOPHIL # BLD: 0.1 K/UL (ref 0–0.4)
EOSINOPHIL NFR BLD: 1 % (ref 0–5)
ERYTHROCYTE [DISTWIDTH] IN BLOOD BY AUTOMATED COUNT: 14.9 % (ref 11.6–14.5)
GLOBULIN SER CALC-MCNC: 4.7 G/DL (ref 2–4)
GLUCOSE SERPL-MCNC: 113 MG/DL (ref 74–99)
GLUCOSE UR STRIP.AUTO-MCNC: NEGATIVE MG/DL
HCT VFR BLD AUTO: 44 % (ref 35–45)
HGB BLD-MCNC: 14.6 G/DL (ref 12–16)
HGB UR QL STRIP: NEGATIVE
KETONES UR QL STRIP.AUTO: NEGATIVE MG/DL
LEUKOCYTE ESTERASE UR QL STRIP.AUTO: NEGATIVE
LIPASE SERPL-CCNC: 108 U/L (ref 73–393)
LYMPHOCYTES # BLD: 1.5 K/UL (ref 0.9–3.6)
LYMPHOCYTES NFR BLD: 15 % (ref 21–52)
MCH RBC QN AUTO: 26.2 PG (ref 24–34)
MCHC RBC AUTO-ENTMCNC: 33.2 G/DL (ref 31–37)
MCV RBC AUTO: 78.9 FL (ref 74–97)
MONOCYTES # BLD: 0.5 K/UL (ref 0.05–1.2)
MONOCYTES NFR BLD: 5 % (ref 3–10)
NEUTS SEG # BLD: 7.7 K/UL (ref 1.8–8)
NEUTS SEG NFR BLD: 79 % (ref 40–73)
NITRITE UR QL STRIP.AUTO: NEGATIVE
PH UR STRIP: 7.5 [PH] (ref 5–8)
PLATELET # BLD AUTO: 237 K/UL (ref 135–420)
PMV BLD AUTO: 10.8 FL (ref 9.2–11.8)
POTASSIUM SERPL-SCNC: 5.7 MMOL/L (ref 3.5–5.5)
PROT SERPL-MCNC: 8.4 G/DL (ref 6.4–8.2)
PROT UR STRIP-MCNC: NEGATIVE MG/DL
RBC # BLD AUTO: 5.58 M/UL (ref 4.2–5.3)
SODIUM SERPL-SCNC: 142 MMOL/L (ref 136–145)
SP GR UR REFRACTOMETRY: 1.01 (ref 1–1.03)
UROBILINOGEN UR QL STRIP.AUTO: 0.2 EU/DL (ref 0.2–1)
WBC # BLD AUTO: 9.8 K/UL (ref 4.6–13.2)

## 2017-10-16 PROCEDURE — 74011000250 HC RX REV CODE- 250: Performed by: PHYSICIAN ASSISTANT

## 2017-10-16 PROCEDURE — 85025 COMPLETE CBC W/AUTO DIFF WBC: CPT | Performed by: PHYSICIAN ASSISTANT

## 2017-10-16 PROCEDURE — 96374 THER/PROPH/DIAG INJ IV PUSH: CPT

## 2017-10-16 PROCEDURE — 96375 TX/PRO/DX INJ NEW DRUG ADDON: CPT

## 2017-10-16 PROCEDURE — 96361 HYDRATE IV INFUSION ADD-ON: CPT

## 2017-10-16 PROCEDURE — 74176 CT ABD & PELVIS W/O CONTRAST: CPT

## 2017-10-16 PROCEDURE — 80053 COMPREHEN METABOLIC PANEL: CPT | Performed by: PHYSICIAN ASSISTANT

## 2017-10-16 PROCEDURE — 96376 TX/PRO/DX INJ SAME DRUG ADON: CPT

## 2017-10-16 PROCEDURE — 74011250637 HC RX REV CODE- 250/637: Performed by: PHYSICIAN ASSISTANT

## 2017-10-16 PROCEDURE — 74011250636 HC RX REV CODE- 250/636: Performed by: PHYSICIAN ASSISTANT

## 2017-10-16 PROCEDURE — 83690 ASSAY OF LIPASE: CPT | Performed by: PHYSICIAN ASSISTANT

## 2017-10-16 PROCEDURE — 99284 EMERGENCY DEPT VISIT MOD MDM: CPT

## 2017-10-16 PROCEDURE — 81003 URINALYSIS AUTO W/O SCOPE: CPT | Performed by: PHYSICIAN ASSISTANT

## 2017-10-16 RX ORDER — PROCHLORPERAZINE EDISYLATE 5 MG/ML
10 INJECTION INTRAMUSCULAR; INTRAVENOUS
Status: COMPLETED | OUTPATIENT
Start: 2017-10-16 | End: 2017-10-16

## 2017-10-16 RX ORDER — MORPHINE SULFATE 2 MG/ML
4 INJECTION, SOLUTION INTRAMUSCULAR; INTRAVENOUS
Status: COMPLETED | OUTPATIENT
Start: 2017-10-16 | End: 2017-10-16

## 2017-10-16 RX ORDER — ONDANSETRON 4 MG/1
4 TABLET, ORALLY DISINTEGRATING ORAL
Status: COMPLETED | OUTPATIENT
Start: 2017-10-16 | End: 2017-10-16

## 2017-10-16 RX ORDER — ONDANSETRON 2 MG/ML
4 INJECTION INTRAMUSCULAR; INTRAVENOUS
Status: COMPLETED | OUTPATIENT
Start: 2017-10-16 | End: 2017-10-16

## 2017-10-16 RX ORDER — MORPHINE SULFATE 2 MG/ML
2 INJECTION, SOLUTION INTRAMUSCULAR; INTRAVENOUS
Status: COMPLETED | OUTPATIENT
Start: 2017-10-16 | End: 2017-10-16

## 2017-10-16 RX ORDER — FAMOTIDINE 10 MG/ML
20 INJECTION INTRAVENOUS
Status: COMPLETED | OUTPATIENT
Start: 2017-10-16 | End: 2017-10-16

## 2017-10-16 RX ORDER — PROCHLORPERAZINE EDISYLATE 5 MG/ML
10 INJECTION INTRAMUSCULAR; INTRAVENOUS
Status: DISCONTINUED | OUTPATIENT
Start: 2017-10-16 | End: 2017-10-16

## 2017-10-16 RX ORDER — ONDANSETRON 4 MG/1
4 TABLET, FILM COATED ORAL
Qty: 8 TAB | Refills: 0 | Status: ON HOLD | OUTPATIENT
Start: 2017-10-16 | End: 2017-10-19

## 2017-10-16 RX ORDER — METOCLOPRAMIDE HYDROCHLORIDE 5 MG/ML
10 INJECTION INTRAMUSCULAR; INTRAVENOUS
Status: COMPLETED | OUTPATIENT
Start: 2017-10-16 | End: 2017-10-16

## 2017-10-16 RX ORDER — ONDANSETRON 2 MG/ML
4 INJECTION INTRAMUSCULAR; INTRAVENOUS
Status: DISCONTINUED | OUTPATIENT
Start: 2017-10-16 | End: 2017-10-16

## 2017-10-16 RX ADMIN — METOCLOPRAMIDE 10 MG: 5 INJECTION, SOLUTION INTRAMUSCULAR; INTRAVENOUS at 11:37

## 2017-10-16 RX ADMIN — Medication 2 MG: at 11:05

## 2017-10-16 RX ADMIN — SODIUM CHLORIDE 1000 ML: 900 INJECTION, SOLUTION INTRAVENOUS at 11:05

## 2017-10-16 RX ADMIN — FAMOTIDINE 20 MG: 10 INJECTION, SOLUTION INTRAVENOUS at 12:01

## 2017-10-16 RX ADMIN — ONDANSETRON 4 MG: 4 TABLET, ORALLY DISINTEGRATING ORAL at 10:18

## 2017-10-16 RX ADMIN — Medication 4 MG: at 12:29

## 2017-10-16 RX ADMIN — ONDANSETRON 4 MG: 2 INJECTION INTRAMUSCULAR; INTRAVENOUS at 11:05

## 2017-10-16 RX ADMIN — PROCHLORPERAZINE EDISYLATE 10 MG: 5 INJECTION INTRAMUSCULAR; INTRAVENOUS at 12:34

## 2017-10-16 NOTE — ED NOTES
I have reviewed discharge instructions with the patient. The patient verbalized understanding. Current Discharge Medication List      START taking these medications    Details   ondansetron hcl (ZOFRAN, AS HYDROCHLORIDE,) 4 mg tablet Take 1 Tab by mouth every eight (8) hours as needed for Nausea.   Qty: 8 Tab, Refills: 0         STOP taking these medications       potassium chloride (KLOR-CON) 20 mEq packet Comments:   Reason for Stopping:         naproxen sodium (ANAPROX DS) 550 mg tablet Comments:   Reason for Stopping:           Patient armband removed and shredded

## 2017-10-16 NOTE — ED PROVIDER NOTES
HPI Comments: 10:03 AM  47 y.o. female with PMH of asthma, hypokalemia and past surgical hx of appendectomy who presents to ED C/O sudden onset of n/v/d and diffuse abdominal pain since last night. Pt notes she had [employer] for dinner and the pain started 1-2 hours after. Her  ate similar food but is not having symptoms. Denies fever/chills, dysuria, hematuria. Notes normal BM yesterday morning. Hx of small bowel obstruction in the past.   Pt denies any other sxs or complaints. Written by Sofya Solo PA-C      Patient is a 47 y.o. female presenting with abdominal pain. The history is provided by the patient and a relative. Abdominal Pain    Associated symptoms include diarrhea, nausea and vomiting. Pertinent negatives include no fever, no constipation, no dysuria and no chest pain. Past Medical History:   Diagnosis Date    Anemia     Asthma     BMI 34.0-34.9,adult 5/11/2017    Bowel obstruction     Gall stones     GERD (gastroesophageal reflux disease)     History of blood transfusion     Hypertension     Sickle cell trait ([facility] [address]        Past Surgical History:   Procedure Laterality Date    ABDOMEN SURGERY PROC UNLISTED  2006    surgery for bowel obstruction    HX APPENDECTOMY      HX TUBAL LIGATION           Family History:   Problem Relation Age of Onset    Cancer Mother     Cancer Brother        Social History     Social History    Marital status:      Spouse name: N/A    Number of children: N/A    Years of education: N/A     Occupational History    Not on file. Social History Main Topics    Smoking status: Never Smoker    Smokeless tobacco: Never Used    Alcohol use No    Drug use: No    Sexual activity: Not on file     Other Topics Concern    Not on file     Social History Narrative         ALLERGIES: Iron;  Other medication; Penicillins; Sulfa (sulfonamide antibiotics); and Venofer [iron sucrose]    Review of Systems   Constitutional: Negative for chills and fever. Respiratory: Negative for shortness of breath. Cardiovascular: Negative for chest pain. Gastrointestinal: Positive for abdominal pain, diarrhea, nausea and vomiting. Negative for blood in stool and constipation. Genitourinary: Negative for dysuria and flank pain. Skin: Negative for rash. Neurological: Negative for weakness. All other systems reviewed and are negative. Vitals:    10/16/17 1232 10/16/17 1234 10/16/17 1300 10/16/17 1330   BP: 143/87  (!) 154/99 158/90   Pulse:       Resp:       Temp:       SpO2:  96% 94% 94%   Weight:       Height:                Physical Exam   Constitutional: She appears well-developed and well-nourished. She appears distressed (mild distress). HENT:   Head: Normocephalic and atraumatic. Neck: Normal range of motion. Neck supple. Cardiovascular: Normal rate, regular rhythm and normal heart sounds. Exam reveals no gallop and no friction rub. No murmur heard. Pulmonary/Chest: Effort normal and breath sounds normal. No respiratory distress. She has no wheezes. She has no rales. Abdominal: Soft. Bowel sounds are normal. She exhibits no distension and no mass. There is tenderness (mild lower abdominal tenderness, RUQ non-tender to palpation). There is no rebound and no guarding. Midline scar, No CVA tenderness     Neurological: She is alert. Skin: Skin is warm. No rash noted. She is not diaphoretic. Nursing note and vitals reviewed.        [facility]  ED Course       Procedures    RESULTS:    CT ABD PELV WO CONT   Final Result          Labs Reviewed   CBC WITH AUTOMATED DIFF - Abnormal; Notable for the following:        Result Value    RBC 5.58 (*)     RDW 14.9 (*)     NEUTROPHILS 79 (*)     LYMPHOCYTES 15 (*)     All other components within normal limits   METABOLIC PANEL, COMPREHENSIVE - Abnormal; Notable for the following:     Potassium 5.7 (*)     Glucose 113 (*)     Creatinine 0.52 (*)     ALT (SGPT) 79 (*)     AST (SGOT) 182 (*) Alk. phosphatase 165 (*)     Protein, total 8.4 (*)     Globulin 4.7 (*)     All other components within normal limits   URINALYSIS W/ RFLX MICROSCOPIC   LIPASE       Recent Results (from the past 12 hour(s))   CBC WITH AUTOMATED DIFF    Collection Time: 10/16/17 10:15 AM   Result Value Ref Range    WBC 9.8 4.6 - 13.2 K/uL    RBC 5.58 (H) 4.20 - 5.30 M/uL    HGB 14.6 12.0 - 16.0 g/dL    HCT 44.0 35.0 - 45.0 %    MCV 78.9 74.0 - 97.0 FL    MCH 26.2 24.0 - 34.0 PG    MCHC 33.2 31.0 - 37.0 g/dL    RDW 14.9 (H) 11.6 - 14.5 %    PLATELET 359 316 - 589 K/uL    MPV 10.8 9.2 - 11.8 FL    NEUTROPHILS 79 (H) 40 - 73 %    LYMPHOCYTES 15 (L) 21 - 52 %    MONOCYTES 5 3 - 10 %    EOSINOPHILS 1 0 - 5 %    BASOPHILS 0 0 - 2 %    ABS. NEUTROPHILS 7.7 1.8 - 8.0 K/UL    ABS. LYMPHOCYTES 1.5 0.9 - 3.6 K/UL    ABS. MONOCYTES 0.5 0.05 - 1.2 K/UL    ABS. EOSINOPHILS 0.1 0.0 - 0.4 K/UL    ABS. BASOPHILS 0.0 0.0 - 0.06 K/UL    DF AUTOMATED     METABOLIC PANEL, COMPREHENSIVE    Collection Time: 10/16/17 10:15 AM   Result Value Ref Range    Sodium 142 136 - 145 mmol/L    Potassium 5.7 (H) 3.5 - 5.5 mmol/L    Chloride 106 100 - 108 mmol/L    CO2 26 21 - 32 mmol/L    Anion gap 10 3.0 - 18 mmol/L    Glucose 113 (H) 74 - 99 mg/dL    BUN 9 7.0 - 18 MG/DL    Creatinine 0.52 (L) 0.6 - 1.3 MG/DL    BUN/Creatinine ratio 17 12 - 20      GFR est AA >60 >60 ml/min/1.73m2    GFR est non-AA >60 >60 ml/min/1.73m2    Calcium 9.2 8.5 - 10.1 MG/DL    Bilirubin, total 0.9 0.2 - 1.0 MG/DL    ALT (SGPT) 79 (H) 13 - 56 U/L    AST (SGOT) 182 (H) 15 - 37 U/L    Alk.  phosphatase 165 (H) 45 - 117 U/L    Protein, total 8.4 (H) 6.4 - 8.2 g/dL    Albumin 3.7 3.4 - 5.0 g/dL    Globulin 4.7 (H) 2.0 - 4.0 g/dL    A-G Ratio 0.8 0.8 - 1.7     LIPASE    Collection Time: 10/16/17 10:15 AM   Result Value Ref Range    Lipase 108 73 - 393 U/L   URINALYSIS W/ RFLX MICROSCOPIC    Collection Time: 10/16/17 11:45 AM   Result Value Ref Range    Color YELLOW      Appearance CLEAR Specific gravity 1.012 1.005 - 1.030      pH (UA) 7.5 5.0 - 8.0      Protein NEGATIVE  NEG mg/dL    Glucose NEGATIVE  NEG mg/dL    Ketone NEGATIVE  NEG mg/dL    Bilirubin NEGATIVE  NEG      Blood NEGATIVE  NEG      Urobilinogen 0.2 0.2 - 1.0 EU/dL    Nitrites NEGATIVE  NEG      Leukocyte Esterase NEGATIVE  NEG       PROGRESS NOTE:   10:29 AM  Pending IV line    11:20 AM  Pt with another episode of emesis. Will order Reglan and reassess    12:27 PM  Pt with continued dry heaves. Notes her GERD has been this significant in the past. Ordered another dose of anti-emetic and pain medicine. Pending CT report. 12:55 PM  Pt sleeping, no emesis since being back from CT    1:39 PM  Reviewed results with patient and family. Pt feeling better, n/v resolved. Abdomen is non-tender throughout. Reviewed need for close follow-up with PMD in 2 days for reassessment and repeat blood work for elevated LFTs and hyperkalemia. Will not take oral potassium for the next 2 days until follow-up  Tolerated PO    IMPRESSION AND MEDICAL DECISION MAKING:  Abdominal Pain D/C: The patient does not have specific s/s of a surgical abdomen or unstable abdominal process. They currently are not vomiting, do not appear dehydrate and are stable for d/c with out patient follow-up. CONDITION ON DISCHARGE:  stable    DISCHARGE NOTE:  1:45 PM  Shona Moore's  results have been reviewed with her. She has been counseled regarding her diagnosis, treatment, and plan. She verbally conveys understanding and agreement of the signs, symptoms, diagnosis, treatment and prognosis and additionally agrees to follow up as discussed. She also agrees with the care-plan and conveys that all of her questions have been answered.   I have also provided discharge instructions for her that include: educational information regarding their diagnosis and treatment, and list of reasons why they would want to return to the ED prior to their follow-up appointment, should her condition change. CLINICAL IMPRESSION:    1. Abdominal pain, lower    2. Elevated LFTs    3. Vomiting and diarrhea    4. Acute hyperkalemia        AFTER VISIT PLAN:    Current Discharge Medication List      START taking these medications    Details   ondansetron hcl (ZOFRAN, AS HYDROCHLORIDE,) 4 mg tablet Take 1 Tab by mouth every eight (8) hours as needed for Nausea. Qty: 8 Tab, Refills: 0         STOP taking these medications       potassium chloride (KLOR-CON) 20 mEq packet Comments:   Reason for Stopping:         naproxen sodium (ANAPROX DS) 550 mg tablet Comments:   Reason for Stopping:                 Follow-up Information     Follow up With Details Comments Contact Info    [facility] EMERGENCY DEPT  If symptoms worsen [address] [place] [phone]  [address] DO In 2 days  [address] [place] [address],[address]  Renu Luna MD Schedule an appointment as soon as possible for a visit  [address] [facility] [address]  [address] [place] [address]  [phone]             Written by Natalia Miguel PA-C

## 2017-10-16 NOTE — DISCHARGE INSTRUCTIONS
Abdominal Pain: Care Instructions  Your Care Instructions    Abdominal pain has many possible causes. Some aren't serious and get better on their own in a few days. Others need more testing and treatment. If your pain continues or gets worse, you need to be rechecked and may need more tests to find out what is wrong. You may need surgery to correct the problem. Don't ignore new symptoms, such as fever, nausea and vomiting, urination problems, pain that gets worse, and dizziness. These may be signs of a more serious problem. Your doctor may have recommended a follow-up visit in the next 8 to 12 hours. If you are not getting better, you may need more tests or treatment. The doctor has checked you carefully, but problems can develop later. If you notice any problems or new symptoms, get medical treatment right away. Follow-up care is a key part of your treatment and safety. Be sure to make and go to all appointments, and call your doctor if you are having problems. It's also a good idea to know your test results and keep a list of the medicines you take. How can you care for yourself at home? · Rest until you feel better. · To prevent dehydration, drink plenty of fluids, enough so that your urine is light yellow or clear like water. Choose water and other caffeine-free clear liquids until you feel better. If you have kidney, heart, or liver disease and have to limit fluids, talk with your doctor before you increase the amount of fluids you drink. · If your stomach is upset, eat mild foods, such as rice, dry toast or crackers, bananas, and applesauce. Try eating several small meals instead of two or three large ones. · Wait until 48 hours after all symptoms have gone away before you have spicy foods, alcohol, and drinks that contain caffeine. · Do not eat foods that are high in fat. · Avoid anti-inflammatory medicines such as aspirin, ibuprofen (Advil, Motrin), and naproxen (Aleve).  These can cause stomach upset. Talk to your doctor if you take daily aspirin for another health problem. When should you call for help? Call 911 anytime you think you may need emergency care. For example, call if:  · You passed out (lost consciousness). · You pass maroon or very bloody stools. · You vomit blood or what looks like coffee grounds. · You have new, severe belly pain. Call your doctor now or seek immediate medical care if:  · Your pain gets worse, especially if it becomes focused in one area of your belly. · You have a new or higher fever. · Your stools are black and look like tar, or they have streaks of blood. · You have unexpected vaginal bleeding. · You have symptoms of a urinary tract infection. These may include:  ¨ Pain when you urinate. ¨ Urinating more often than usual.  ¨ Blood in your urine. · You are dizzy or lightheaded, or you feel like you may faint. Watch closely for changes in your health, and be sure to contact your doctor if:  · You are not getting better after 1 day (24 hours). Where can you learn more? Go to http://giselleWinProbepradeep.info/. Enter C442 in the search box to learn more about \"Abdominal Pain: Care Instructions. \"  Current as of: March 20, 2017  Content Version: 11.3  © 9076-8284 [employer]. Care instructions adapted under license by [employer] (which disclaims liability or warranty for this information). If you have questions about a medical condition or this instruction, always ask your healthcare professional. [place] [address] any warranty or liability for your use of this information. Nausea and Vomiting: Care Instructions  Your Care Instructions    When you are nauseated, you may feel weak and sweaty and notice a lot of saliva in your mouth. Nausea often leads to vomiting. Most of the time you do not need to worry about nausea and vomiting, but they can be signs of other illnesses.   Two common causes of nausea and vomiting are stomach flu and food poisoning. Nausea and vomiting from viral stomach flu will usually start to improve within 24 hours. Nausea and vomiting from food poisoning may last from 12 to 48 hours. The doctor has checked you carefully, but problems can develop later. If you notice any problems or new symptoms, get medical treatment right away. Follow-up care is a key part of your treatment and safety. Be sure to make and go to all appointments, and call your doctor if you are having problems. It's also a good idea to know your test results and keep a list of the medicines you take. How can you care for yourself at home? · To prevent dehydration, drink plenty of fluids, enough so that your urine is light yellow or clear like water. Choose water and other caffeine-free clear liquids until you feel better. If you have kidney, heart, or liver disease and have to limit fluids, talk with your doctor before you increase the amount of fluids you drink. · Rest in bed until you feel better. · When you are able to eat, try clear soups, mild foods, and liquids until all symptoms are gone for 12 to 48 hours. Other good choices include dry toast, crackers, cooked cereal, and gelatin dessert, such as Jell-O. When should you call for help? Call 911 anytime you think you may need emergency care. For example, call if:  · You passed out (lost consciousness). Call your doctor now or seek immediate medical care if:  · You have symptoms of dehydration, such as:  ¨ Dry eyes and a dry mouth. ¨ Passing only a little dark urine. ¨ Feeling thirstier than usual.  · You have new or worsening belly pain. · You have a new or higher fever. · You vomit blood or what looks like coffee grounds. Watch closely for changes in your health, and be sure to contact your doctor if:  · You have ongoing nausea and vomiting. · Your vomiting is getting worse. · Your vomiting lasts longer than 2 days.   · You are not getting better as expected. Where can you learn more? Go to http://giselle-pradeep.info/. Enter 25 [id number] in the search box to learn more about \"Nausea and Vomiting: Care Instructions. \"  Current as of: 2017  Content Version: 11.3  © 5880-6105 [employer]. Care instructions adapted under license by [employer] (which disclaims liability or warranty for this information). If you have questions about a medical condition or this instruction, always ask your healthcare professional. [address] any warranty or liability for your use of this information. [facility] Activation    Thank you for requesting access to [facility]. Please follow the instructions below to securely access and download your online medical record. [facility] allows you to send messages to your doctor, view your test results, renew your prescriptions, schedule appointments, and more. How Do I Sign Up? 1. In your internet browser, go to [email or web address]  2. Click on the First Time User? Click Here link in the Sign In box. You will be redirect to the New Member Sign Up page. 3. Enter your [facility] Access Code exactly as it appears below. You will not need to use this code after youve completed the sign-up process. If you do not sign up before the expiration date, you must request a new code. [facility] Access Code: [email or web address]  Expires: 10/25/2017  6:49 PM (This is the date your [facility] access code will )    4. Enter the last four digits of your Social Security Number (xxxx) and Date of Birth (mm/dd/yyyy) as indicated and click Submit. You will be taken to the next sign-up page. 5. Create a [facility] ID. This will be your [facility] login ID and cannot be changed, so think of one that is secure and easy to remember. 6. Create a [facility] password. You can change your password at any time. 7. Enter your Password Reset Question and Answer.  This can be used at a later time if you forget your password. 8. Enter your e-mail address. You will receive e-mail notification when new information is available in [address] 9. Click Sign Up. You can now view and download portions of your medical record. 10. Click the Download Summary menu link to download a portable copy of your medical information. Additional Information    If you have questions, please visit the Frequently Asked Questions section of the [facility] [facility] website at [email or web address] Remember, [facility] is NOT to be used for urgent needs. For medical emergencies, dial 911.

## 2017-10-16 NOTE — ED NOTES
Attempt to establish line x 2 without success. Pt states very hard stick. MD notified. Pt actively vomiting. Order for Zofran ODT obtained.

## 2017-10-18 ENCOUNTER — OFFICE VISIT (OUTPATIENT)
Dept: ORTHOPEDIC SURGERY | Facility: CLINIC | Age: 52
End: 2017-10-18

## 2017-10-18 ENCOUNTER — HOSPITAL ENCOUNTER (INPATIENT)
Age: 52
LOS: 1 days | Discharge: HOME | DRG: 641 | End: 2017-10-19
Attending: EMERGENCY MEDICINE | Admitting: INTERNAL MEDICINE
Payer: OTHER GOVERNMENT

## 2017-10-18 VITALS
SYSTOLIC BLOOD PRESSURE: 165 MMHG | TEMPERATURE: 93 F | BODY MASS INDEX: 33.43 KG/M2 | WEIGHT: 195.8 LBS | DIASTOLIC BLOOD PRESSURE: 104 MMHG | OXYGEN SATURATION: 98 % | RESPIRATION RATE: 18 BRPM | HEIGHT: 64 IN | HEART RATE: 86 BPM

## 2017-10-18 DIAGNOSIS — E87.6 HYPOKALEMIA: Primary | ICD-10-CM

## 2017-10-18 DIAGNOSIS — Z98.890 S/P TRIGGER FINGER RELEASE: ICD-10-CM

## 2017-10-18 DIAGNOSIS — R94.31 PROLONGED Q-T INTERVAL ON ECG: ICD-10-CM

## 2017-10-18 DIAGNOSIS — M65.342 TRIGGER FINGER, LEFT RING FINGER: Primary | ICD-10-CM

## 2017-10-18 LAB
ANION GAP SERPL CALC-SCNC: 8 MMOL/L (ref 3–18)
APPEARANCE UR: CLEAR
ATRIAL RATE: 71 BPM
BACTERIA URNS QL MICRO: ABNORMAL /HPF
BILIRUB UR QL: NEGATIVE
BUN SERPL-MCNC: 7 MG/DL (ref 7–18)
BUN/CREAT SERPL: 8 (ref 12–20)
CALCIUM SERPL-MCNC: 9.2 MG/DL (ref 8.5–10.1)
CALCULATED P AXIS, ECG09: 54 DEGREES
CALCULATED R AXIS, ECG10: -14 DEGREES
CALCULATED T AXIS, ECG11: 43 DEGREES
CHLORIDE SERPL-SCNC: 101 MMOL/L (ref 100–108)
CO2 SERPL-SCNC: 31 MMOL/L (ref 21–32)
COLOR UR: YELLOW
CREAT SERPL-MCNC: 0.83 MG/DL (ref 0.6–1.3)
DIAGNOSIS, 93000: NORMAL
EPITH CASTS URNS QL MICRO: ABNORMAL /LPF (ref 0–5)
GLUCOSE SERPL-MCNC: 118 MG/DL (ref 74–99)
GLUCOSE UR STRIP.AUTO-MCNC: NEGATIVE MG/DL
HGB UR QL STRIP: NEGATIVE
KETONES UR QL STRIP.AUTO: NEGATIVE MG/DL
LEUKOCYTE ESTERASE UR QL STRIP.AUTO: ABNORMAL
MAGNESIUM SERPL-MCNC: 1.7 MG/DL (ref 1.6–2.6)
NITRITE UR QL STRIP.AUTO: NEGATIVE
P-R INTERVAL, ECG05: 128 MS
PH UR STRIP: 5.5 [PH] (ref 5–8)
POTASSIUM SERPL-SCNC: 2.9 MMOL/L (ref 3.5–5.5)
PROT UR STRIP-MCNC: 100 MG/DL
Q-T INTERVAL, ECG07: 430 MS
QRS DURATION, ECG06: 78 MS
QTC CALCULATION (BEZET), ECG08: 467 MS
RBC #/AREA URNS HPF: ABNORMAL /HPF (ref 0–5)
SODIUM SERPL-SCNC: 140 MMOL/L (ref 136–145)
SP GR UR REFRACTOMETRY: 1.02 (ref 1–1.03)
UROBILINOGEN UR QL STRIP.AUTO: 1 EU/DL (ref 0.2–1)
VENTRICULAR RATE, ECG03: 71 BPM
WBC URNS QL MICRO: ABNORMAL /HPF (ref 0–4)

## 2017-10-18 PROCEDURE — 99284 EMERGENCY DEPT VISIT MOD MDM: CPT

## 2017-10-18 PROCEDURE — 96375 TX/PRO/DX INJ NEW DRUG ADDON: CPT

## 2017-10-18 PROCEDURE — 65660000000 HC RM CCU STEPDOWN

## 2017-10-18 PROCEDURE — 81001 URINALYSIS AUTO W/SCOPE: CPT

## 2017-10-18 PROCEDURE — 93005 ELECTROCARDIOGRAM TRACING: CPT

## 2017-10-18 PROCEDURE — 96365 THER/PROPH/DIAG IV INF INIT: CPT

## 2017-10-18 PROCEDURE — 80048 BASIC METABOLIC PNL TOTAL CA: CPT

## 2017-10-18 PROCEDURE — 74011250636 HC RX REV CODE- 250/636: Performed by: PHYSICIAN ASSISTANT

## 2017-10-18 PROCEDURE — 96361 HYDRATE IV INFUSION ADD-ON: CPT

## 2017-10-18 PROCEDURE — 83735 ASSAY OF MAGNESIUM: CPT

## 2017-10-18 RX ORDER — POTASSIUM CHLORIDE 7.45 MG/ML
10 INJECTION INTRAVENOUS
Status: DISPENSED | OUTPATIENT
Start: 2017-10-18 | End: 2017-10-18

## 2017-10-18 RX ORDER — MORPHINE SULFATE 2 MG/ML
2 INJECTION, SOLUTION INTRAMUSCULAR; INTRAVENOUS
Status: COMPLETED | OUTPATIENT
Start: 2017-10-18 | End: 2017-10-18

## 2017-10-18 RX ORDER — ONDANSETRON 2 MG/ML
4 INJECTION INTRAMUSCULAR; INTRAVENOUS
Status: COMPLETED | OUTPATIENT
Start: 2017-10-18 | End: 2017-10-18

## 2017-10-18 RX ORDER — ONDANSETRON 4 MG/1
4 TABLET, FILM COATED ORAL
Qty: 12 TAB | Refills: 0 | Status: ON HOLD | OUTPATIENT
Start: 2017-10-18 | End: 2017-10-19

## 2017-10-18 RX ORDER — ACETAMINOPHEN 500 MG
TABLET ORAL
COMMUNITY
End: 2018-08-22

## 2017-10-18 RX ORDER — MAGNESIUM SULFATE HEPTAHYDRATE 40 MG/ML
2 INJECTION, SOLUTION INTRAVENOUS ONCE
Status: COMPLETED | OUTPATIENT
Start: 2017-10-18 | End: 2017-10-18

## 2017-10-18 RX ADMIN — POTASSIUM CHLORIDE 10 MEQ: 10 INJECTION, SOLUTION INTRAVENOUS at 20:31

## 2017-10-18 RX ADMIN — ONDANSETRON 4 MG: 2 INJECTION INTRAMUSCULAR; INTRAVENOUS at 19:57

## 2017-10-18 RX ADMIN — Medication 2 MG: at 19:56

## 2017-10-18 RX ADMIN — ONDANSETRON 4 MG: 2 INJECTION INTRAMUSCULAR; INTRAVENOUS at 18:05

## 2017-10-18 RX ADMIN — MAGNESIUM SULFATE IN WATER 2 G: 40 INJECTION, SOLUTION INTRAVENOUS at 19:00

## 2017-10-18 RX ADMIN — SODIUM CHLORIDE 1000 ML: 900 INJECTION, SOLUTION INTRAVENOUS at 18:05

## 2017-10-18 NOTE — MR AVS SNAPSHOT
Visit Information Date & Time Provider Department Dept. Phone Encounter #  
 10/18/2017  9:00 AM Lei [place], [address] [facility] [phone] [id number] Upcoming Health Maintenance Date Due Hepatitis C Screening 1965 PAP AKA CERVICAL CYTOLOGY 7/25/1985 BREAST CANCER SCRN MAMMOGRAM 7/25/2014 FOBT Q 1 YEAR AGE 50-75 7/25/2014 INFLUENZA AGE 9 TO ADULT 8/1/2017 DTaP/Tdap/Td series (2 - Td) 9/16/2024 Allergies as of 10/18/2017  Review Complete On: 10/18/2017 By: Brittni Rojas Severity Noted Reaction Type Reactions Iron High 01/03/2013   Systemic Anaphylaxis Other Medication High 03/19/2015   Systemic Nausea and Vomiting All \"mycins\" Penicillins  01/03/2013   Topical Rash  
 Sulfa (Sulfonamide Antibiotics)  01/03/2013   Topical Hives Venofer [Iron Sucrose]  04/04/2013    Rash, Itching Current Immunizations  Reviewed on 8/11/2016 Name Date Tdap 9/16/2014  7:02 PM  
  
 Not reviewed this visit Vitals BP Pulse Temp Resp Height(growth percentile) Weight(growth percentile) (!) 165/104 86 (!) 93 °F (33.9 °C) (Oral) 18 5' 4\" (1.626 m) 195 lb 12.8 oz (88.8 kg) SpO2 BMI OB Status Smoking Status 98% 33.61 kg/m2 Menopause Never Smoker BMI and BSA Data Body Mass Index Body Surface Area  
 33.61 kg/m 2 2 m 2 Preferred Pharmacy Pharmacy Name Phone NO PHARMACY PREFERENCE Your Updated Medication List  
  
   
This list is accurate as of: 10/18/17  9:10 AM.  Always use your most recent med list.  
  
  
  
  
 [place] 250-50 mcg/dose diskus inhaler Generic drug:  fluticasone-salmeterol Take 1 Puff by inhalation every twelve (12) hours. albuterol 90 mcg/actuation inhaler Commonly known as:  Ann Mode Take 1-2 Puffs by inhalation every four (4) hours as needed for Wheezing. ascorbic acid (vitamin C) 500 mg tablet Commonly known as:  VITAMIN C Take 500 mg by mouth daily. BENADRYL 25 mg capsule Generic drug:  diphenhydrAMINE Take 25 mg by mouth every six (6) hours as needed. ergocalciferol 50,000 unit capsule Commonly known as:  VITAMIN D2 Take 1 Cap by mouth every seven (7) days. ondansetron 4 mg disintegrating tablet Commonly known as:  ZOFRAN ODT Take 1 Tab by mouth every eight (8) hours as needed for Nausea. ondansetron hcl 4 mg tablet Commonly known as:  ZOFRAN (AS HYDROCHLORIDE) Take 1 Tab by mouth every eight (8) hours as needed for Nausea. triamcinolone acetonide 0.1 % ointment Commonly known as:  KENALOG Apply  to affected area two (2) times a day. use thin layer TYLENOL EXTRA STRENGTH 500 mg tablet Generic drug:  acetaminophen Take  by mouth every six (6) hours as needed for Pain. VITAMIN B-12 PO Take  by mouth. Introducing [facility]! [facility] introduces [facility] patient portal. Now you can access parts of your medical record, email your doctor's office, and request medication refills online. 1. In your internet browser, go to [email or web address] 2. Click on the First Time User? Click Here link in the Sign In box. You will see the New Member Sign Up page. 3. Enter your [employer] Access Code exactly as it appears below. You will not need to use this code after youve completed the sign-up process. If you do not sign up before the expiration date, you must request a new code. · [employer] Access Code: [email or web address] Expires: 10/25/2017  6:49 PM 
 
4. Enter the last four digits of your Social Security Number (xxxx) and Date of Birth (mm/dd/yyyy) as indicated and click Submit. You will be taken to the next sign-up page. 5. Create a TxtFeedbackt ID. This will be your [employer] login ID and cannot be changed, so think of one that is secure and easy to remember. 6. Create a [employer] password. You can change your password at any time. 7. Enter your Password Reset Question and Answer. This can be used at a later time if you forget your password. 8. Enter your e-mail address. You will receive e-mail notification when new information is available in [address] 9. Click Sign Up. You can now view and download portions of your medical record. 10. Click the Download Summary menu link to download a portable copy of your medical information. If you have questions, please visit the Frequently Asked Questions section of the [employer] website. Remember, [employer] is NOT to be used for urgent needs. For medical emergencies, dial 911. Now available from your iPhone and Android! Please provide this summary of care documentation to your next provider. Your primary care clinician is listed as Barbi Levin. If you have any questions after today's visit, please call [phone].

## 2017-10-18 NOTE — ED NOTES
Bedside shift change report given to [address] [place] [address] 107 (oncoming nurse) by Scout Guillen RN (offgoing nurse). Report included the following information SBAR.

## 2017-10-18 NOTE — ED NOTES
:\"Bedside\"} shift change report given to DUNCAN Coker (oncoming nurse) by Mohan Silverio Rn  (offgoing nurse). Report included the following information SBAR, ED Summary, MAR and Recent Results.

## 2017-10-18 NOTE — ED TRIAGE NOTES
Patient states being evaluated and treated yesterday in ER for food poisoning. States elevated blood pressure today.

## 2017-10-18 NOTE — IP AVS SNAPSHOT
[address] [address] [place] Patient: Charito Hood MRN: [id number] :1965 Current Discharge Medication List  
  
CONTINUE these medications which have NOT CHANGED Dose & Instructions Dispensing Information Comments Morning Noon Evening Bedtime ADVAIR DISKUS 250-50 mcg/dose diskus inhaler Generic drug:  fluticasone-salmeterol Your last dose was: Your next dose is:    
   
   
 Dose:  1 Puff Take 1 Puff by inhalation every twelve (12) hours. Refills:  0  
     
   
   
   
  
 albuterol 90 mcg/actuation inhaler Commonly known as:  Melissa Luis Your last dose was: Your next dose is:    
   
   
 Dose:  1-2 Puff Take 1-2 Puffs by inhalation every four (4) hours as needed for Wheezing. Quantity:  1 g Refills:  3 BENADRYL 25 mg capsule Generic drug:  diphenhydrAMINE Your last dose was: Your next dose is:    
   
   
 Dose:  25 mg Take 25 mg by mouth every six (6) hours as needed. Refills:  0  
     
   
   
   
  
 ergocalciferol 50,000 unit capsule Commonly known as:  VITAMIN D2 Your last dose was: Your next dose is:    
   
   
 Dose:  55833 Units Take 1 Cap by mouth every seven (7) days. Quantity:  12 Cap Refills:  1  
     
   
   
   
  
 ondansetron 4 mg disintegrating tablet Commonly known as:  ZOFRAN ODT Your last dose was: Your next dose is:    
   
   
 Dose:  4 mg Take 1 Tab by mouth every eight (8) hours as needed for Nausea. Quantity:  14 Tab Refills:  0  
     
   
   
   
  
 triamcinolone acetonide 0.1 % ointment Commonly known as:  KENALOG Your last dose was: Your next dose is:    
   
   
 Apply  to affected area two (2) times a day. use thin layer Quantity:  30 g Refills:  3  
     
   
   
   
  
 TYLENOL EXTRA STRENGTH 500 mg tablet Generic drug:  acetaminophen Your last dose was: Your next dose is: Take  by mouth every six (6) hours as needed for Pain. Refills:  0  
     
   
   
   
  
 VITAMIN B-12 PO Your last dose was: Your next dose is: Take  by mouth. Refills:  0

## 2017-10-18 NOTE — IP AVS SNAPSHOT
Krystin Fowler 
 
 
 [address] [place] Patient: Shira Fraser MRN: [id number] :1965 You are allergic to the following Allergen Reactions Iron Anaphylaxis Other Medication Nausea and Vomiting All \"mycins\" Penicillins Rash  
    
 Sulfa (Sulfonamide Antibiotics) Hives Venofer (Iron Sucrose) Rash Itching Recent Documentation Height Weight Breastfeeding? BMI OB Status Smoking Status 1.626 m 88.5 kg No 33.47 kg/m2 Menopause Never Smoker Emergency Contacts Name Discharge Info Relation Home Work Mobile Franklyn Moore DISCHARGE CAREGIVER [3] Spouse [3] [phone] Felipe Muñoz DISCHARGE CAREGIVER [3] Child [2] [phone] About your hospitalization You were admitted on:  2017 You last received care in the:  [address] [place] [address] You were discharged on:  2017 Unit phone number:  [phone] Why you were hospitalized Your primary diagnosis was:  Prolonged Q-T Interval On Ecg Your diagnoses also included:  Chest Pain Providers Seen During Your Hospitalizations Provider Role Specialty Primary office phone Martin Alexander MD Attending Provider Emergency Medicine [phone] Simone Fox MD Attending Provider Emergency Medicine [phone] Dada Barrios MD Attending Provider Internal Medicine [phone] Ranulfo Webster MD Attending Provider Internal Medicine [phone] Your Primary Care Physician (PCP) Primary Care Physician Office Phone Office Fax 254 [address] [phone] Follow-up Information Follow up With Details Comments Contact Info [facility]. [address] [address] 
[phone] Your Appointments  2017 10:00 AM EDT  
 POST OP with Evette Tran PA-C  
[facility] [address] ([address]) [address] [facility] [place], [address] [place] [address]  
[phone] Current Discharge Medication List  
  
CONTINUE these medications which have NOT CHANGED Dose & Instructions Dispensing Information Comments Morning Noon Evening Bedtime ADVAIR DISKUS 250-50 mcg/dose diskus inhaler Generic drug:  fluticasone-salmeterol Your last dose was: Your next dose is:    
   
   
 Dose:  1 Puff Take 1 Puff by inhalation every twelve (12) hours. Refills:  0  
     
   
   
   
  
 albuterol 90 mcg/actuation inhaler Commonly known as:  Kamala Portal Your last dose was: Your next dose is:    
   
   
 Dose:  1-2 Puff Take 1-2 Puffs by inhalation every four (4) hours as needed for Wheezing. Quantity:  1 g Refills:  3 BENADRYL 25 mg capsule Generic drug:  diphenhydrAMINE Your last dose was: Your next dose is:    
   
   
 Dose:  25 mg Take 25 mg by mouth every six (6) hours as needed. Refills:  0  
     
   
   
   
  
 ergocalciferol 50,000 unit capsule Commonly known as:  VITAMIN D2 Your last dose was: Your next dose is:    
   
   
 Dose:  [address]28 Units Take 1 Cap by mouth every seven (7) days. Quantity:  12 Cap Refills:  1  
     
   
   
   
  
 ondansetron 4 mg disintegrating tablet Commonly known as:  ZOFRAN ODT Your last dose was: Your next dose is:    
   
   
 Dose:  4 mg Take 1 Tab by mouth every eight (8) hours as needed for Nausea. Quantity:  14 Tab Refills:  0  
     
   
   
   
  
 triamcinolone acetonide 0.1 % ointment Commonly known as:  KENALOG Your last dose was: Your next dose is:    
   
   
 Apply  to affected area two (2) times a day. use thin layer Quantity:  30 g Refills:  3 TYLENOL EXTRA STRENGTH 500 mg tablet Generic drug:  acetaminophen Your last dose was: Your next dose is: Take  by mouth every six (6) hours as needed for Pain. Refills:  0  
     
   
   
   
  
 VITAMIN B-12 PO Your last dose was: Your next dose is: Take  by mouth. Refills:  0 Discharge Instructions DISCHARGE SUMMARY from Nurse The following personal items are in your possession at time of discharge: 
 
Dental Appliances: None Visual Aid: Glasses Home Medications: None Jewelry: Víctor Roland Clothing: At bedside Other Valuables: Cell Phone PATIENT INSTRUCTIONS: 
 
 
F-face looks uneven A-arms unable to move or move unevenly S-speech slurred or non-existent T-time-call 911 as soon as signs and symptoms begin-DO NOT go Back to bed or wait to see if you get better-TIME IS BRAIN. Warning Signs of HEART ATTACK Call 911 if you have these symptoms: 
? Chest discomfort. Most heart attacks involve discomfort in the center of the chest that lasts more than a few minutes, or that goes away and comes back. It can feel like uncomfortable pressure, squeezing, fullness, or pain. ? Discomfort in other areas of the upper body. Symptoms can include pain or discomfort in one or both arms, the back, neck, jaw, or stomach. ? Shortness of breath with or without chest discomfort. ? Other signs may include breaking out in a cold sweat, nausea, or lightheadedness. Don't wait more than five minutes to call [address]! Fast action can save your life. Calling 911 is almost always the fastest way to get lifesaving treatment. Emergency Medical Services staff can begin treatment when they arrive  up to an hour sooner than if someone gets to the hospital by car. The discharge information has been reviewed with the patient. The patient verbalized understanding. Discharge medications reviewed with the patient and appropriate educational materials and side effects teaching were provided. Hypokalemia: Care Instructions Your Care Instructions Hypokalemia (say \"fw-jx-cxy-TANJA-jennifer-uh\") is a low level of potassium. The heart, muscles, kidneys, and nervous system all need potassium to work well. This problem has many different causes. Kidney problems, diet, and medicines like diuretics and laxatives can cause it. So can vomiting or diarrhea. In some cases, cancer is the cause. Your doctor may do tests to find the cause of your low potassium levels. You may need medicines to bring your potassium levels back to normal. You may also need regular blood tests to check your potassium. If you have very low potassium, you may need intravenous (IV) medicines. You also may need tests to check the electrical activity of your heart. Heart problems caused by low potassium levels can be very serious. Follow-up care is a key part of your treatment and safety. Be sure to make and go to all appointments, and call your doctor if you are having problems. It's also a good idea to know your test results and keep a list of the medicines you take. How can you care for yourself at home? · If your doctor recommends it, eat foods that have a lot of potassium. These include fresh fruits, juices, and vegetables. They also include nuts, beans, and milk. · Be safe with medicines. If your doctor prescribes medicines or potassium supplements, take them exactly as directed. Call your doctor if you have any problems with your medicines. · Get your potassium levels tested as often as your doctor tells you. When should you call for help? Call 911 anytime you think you may need emergency care. For example, call if: 
· You feel like your heart is missing beats.  Heart problems caused by low potassium can cause death. · You passed out (lost consciousness). · You have a seizure. Call your doctor now or seek immediate medical care if: 
· You feel weak or unusually tired. · You have severe arm or leg cramps. · You have tingling or numbness. · You feel sick to your stomach, or you vomit. · You have belly cramps. · You feel bloated or constipated. · You have to urinate a lot. · You feel very thirsty most of the time. · You are dizzy or lightheaded, or you feel like you may faint. · You feel depressed, or you lose touch with reality. Watch closely for changes in your health, and be sure to contact your doctor if: 
· You do not get better as expected. Where can you learn more? Go to http://giselle-pradeep.info/. Enter G358 in the search box to learn more about \"Hypokalemia: Care Instructions. \" Current as of: July 28, 2016 Content Version: 11.3 © 6558-2942 [employer]. Care instructions adapted under license by [employer] (which disclaims liability or warranty for this information). If you have questions about a medical condition or this instruction, always ask your healthcare professional. [address] any warranty or liability for your use of this information. Discharge Instructions Attachments/References HYPERTENSION: ACUTE (ENGLISH) ELECTROLYTE IMBALANCE (ENGLISH) Discharge Orders None [facility] HospitalGeneNews Announcement We are excited to announce that we are making your provider's discharge notes available to you in Beyond [employer]entials. You will see these notes when they are completed and signed by the physician that discharged you from your recent hospital stay. If you have any questions or concerns about any information you see in Beyond Credentials, please call the Health Information Department where you were seen or reach out to your Primary Care Provider for more information about your plan of care. Introducing [facility]! Jamel Sanchez introduces [employer] patient portal. Now you can access parts of your medical record, email your doctor's office, and request medication refills online. 1. In your internet browser, go to [email or web address] 2. Click on the First Time User? Click Here link in the Sign In box. You will see the New Member Sign Up page. 3. Enter your Play[employer] Access Code exactly as it appears below. You will not need to use this code after youve completed the sign-up process. If you do not sign up before the expiration date, you must request a new code. · [employer] Access Code: [email or web address] Expires: 10/25/2017  6:49 PM 
 
4. Enter the last four digits of your Social Security Number (xxxx) and Date of Birth (mm/dd/yyyy) as indicated and click Submit. You will be taken to the next sign-up page. 5. Create a Play[employer] CRM ID. This will be your [employer] login ID and cannot be changed, so think of one that is secure and easy to remember. 6. Create a [employer] password. You can change your password at any time. 7. Enter your Password Reset Question and Answer. This can be used at a later time if you forget your password. 8. Enter your e-mail address. You will receive e-mail notification when new information is available in [address] 9. Click Sign Up. You can now view and download portions of your medical record. 10. Click the Download Summary menu link to download a portable copy of your medical information. If you have questions, please visit the Frequently Asked Questions section of the [employer] website. Remember, [employer] is NOT to be used for urgent needs. For medical emergencies, dial 911. Now available from your iPhone and Android! General Information Please provide this summary of care documentation to your next provider. Patient Signature:  ____________________________________________________________ Date:  ____________________________________________________________  
  
René Shove Provider Signature:  ____________________________________________________________ Date:  ____________________________________________________________ More Information Acute High Blood Pressure: Care Instructions Your Care Instructions Acute high blood pressure is very high blood pressure. It's a serious problem. Very high blood pressure can damage your brain, heart, eyes, and kidneys. You may have been given medicines to lower your blood pressure. You may have gotten them as pills or through a needle in one of your veins. This is called an IV. And maybe you were given other medicines too. These can be needed when high blood pressure causes other problems. To keep your blood pressure at a lower level, you may need to make healthy lifestyle changes. And you will probably need to take medicines. Be sure to follow up with your doctor about your blood pressure and what you can do about it. Follow-up care is a key part of your treatment and safety. Be sure to make and go to all appointments, and call your doctor if you are having problems. It's also a good idea to know your test results and keep a list of the medicines you take. How can you care for yourself at home? · See your doctor as often as he or she recommends. This is to make sure your blood pressure is under control. You will probably go at least 2 times a year. But it may be more often at first. 
· Take your blood pressure medicine exactly as prescribed. You may take one or more types. They include diuretics, beta-blockers, ACE inhibitors, calcium channel blockers, and angiotensin II receptor blockers. Call your doctor if you think you are having a problem with your medicine. · If you take blood pressure medicine, talk to your doctor before you take decongestants or anti-inflammatory medicine, such as ibuprofen. These can raise blood pressure. · Learn how to check your blood pressure at home. Check it often. · Ask your doctor if it's okay to drink alcohol. · Talk to your doctor about lifestyle changes that can help blood pressure. These include being active and not smoking. When should you call for help? Call 911 anytime you think you may need emergency care. This may mean having symptoms that suggest that your blood pressure is causing a serious heart or blood vessel problem. Your blood pressure may be over 180/110. For example, call 911 if: 
· You have symptoms of a heart attack. These may include: ¨ Chest pain or pressure, or a strange feeling in the chest. 
¨ Sweating. ¨ Shortness of breath. ¨ Nausea or vomiting. ¨ Pain, pressure, or a strange feeling in the back, neck, jaw, or upper belly or in one or both shoulders or arms. ¨ Lightheadedness or sudden weakness. ¨ A fast or irregular heartbeat. · You have symptoms of a stroke. These may include: 
¨ Sudden numbness, tingling, weakness, or loss of movement in your face, arm, or leg, especially on only one side of your body. ¨ Sudden vision changes. ¨ Sudden trouble speaking. ¨ Sudden confusion or trouble understanding simple statements. ¨ Sudden problems with walking or balance. ¨ A sudden, severe headache that is different from past headaches. · You have severe back or belly pain. Do not wait until your blood pressure comes down on its own. Get help right away. Call your doctor now or seek immediate care if: 
· Your blood pressure is much higher than normal (such as 180/110 or higher), but you don't have symptoms. · You think high blood pressure is causing symptoms, such as: ¨ Severe headache. ¨ Blurry vision. Watch closely for changes in your health, and be sure to contact your doctor if: 
· Your blood pressure measures 140/90 or higher at least 2 times. That means the top number is 140 or higher or the bottom number is 90 or higher, or both. · You think you may be having side effects from your blood pressure medicine. · Your blood pressure is usually normal, but it goes above normal at least 2 times. Where can you learn more? Go to http://giselle-pradeep.info/. Enter L418 in the search box to learn more about \"Acute High Blood Pressure: Care Instructions. \" Current as of: August 8, 2016 Content Version: 11.3 © 2127-0399 [employer]. Care instructions adapted under license by [employer] (which disclaims liability or warranty for this information). If you have questions about a medical condition or this instruction, always ask your healthcare professional. [address] any warranty or liability for your use of this information. Electrolyte Imbalance: Care Instructions Your Care Instructions Electrolytes are minerals in your blood. They include sodium, potassium, calcium, and magnesium. When they are not at the right levels, you can feel very ill. You may not know what is causing it, but you know something is wrong. You may feel weak or numb, have muscle spasms, or twitch. Your heart may beat fast. Symptoms are different with each mineral. Too much is as bad as too little. Minerals help keep your body working as it should. Vomiting, diarrhea, and fever can cause you to lose minerals. A problem with your kidneys can tip a mineral out of balance. So can taking certain medicines. Your doctor may do more tests. He or she may change your medicine and diet. If you are low in one or more minerals, they may be given through a tube into your vein (IV). Your doctor may have you take or drink special fluids or pills. If your kidneys are failing, your blood may be filtered. This is called dialysis. It can put the minerals back in balance. Follow-up care is a key part of your treatment and safety.  Be sure to make and go to all appointments, and call your doctor if you are having problems. It's also a good idea to know your test results and keep a list of the medicines you take. How can you care for yourself at home? · Take your medicines exactly as prescribed. Call your doctor if you have any problems with your medicines. You will get more details on the specific medicines your doctor prescribes. · Do not take any medicine without talking to your doctor first. This includes prescription, over-the-counter, and herbal medicines. · If you have kidney, heart, or liver disease and have to limit fluids, talk with your doctor before you increase the amount of fluids you drink. · Your doctor or dietitian may give you a diet plan to help balance your minerals. Follow the diet carefully. When should you call for help? Call 911 anytime you think you may need emergency care. For example, call if: 
· You passed out (lost consciousness). · Your heart seems to be speeding up and then slowing down or skipping beats. Call your doctor now or seek immediate medical care if: 
· You are very tired and have no energy. · You have trouble thinking or concentrating. Watch closely for changes in your health, and be sure to contact your doctor if: 
· You want advice on what to do to keep your minerals in balance. · You do not get better as expected. Where can you learn more? Go to http://giselle-pradeep.info/. Enter N252 in the search box to learn more about \"Electrolyte Imbalance: Care Instructions. \" Current as of: July 26, 2016 Content Version: 11.3 © 7350-1316 [employer]. Care instructions adapted under license by [employer] (which disclaims liability or warranty for this information). If you have questions about a medical condition or this instruction, always ask your healthcare professional. [place] [address] any warranty or liability for your use of this information.

## 2017-10-18 NOTE — ED PROVIDER NOTES
Patient is a 47 y.o. female presenting with hypertension. Hypertension    Associated symptoms include headaches and nausea. Pertinent negatives include no chest pain, no palpitations, no tinnitus, no dizziness, no shortness of breath and no vomiting. Sia Macdonald is a 47 y.o. female female with prior hx of SBO post surgery in 2006, recent trigger finger surgery L hand presents with c/o elevated blood pressure with mild headache but denies of any visual changes, no prior known hx of being treated for HTN or issues with kidney. Denies of any urinary sx, fever, chills, n/v, abdominal pain or diarrhea. She was seen in ER on 16 th of this month for diarrhea and vomiting which has since resolved. But has had some nausea with dry heaves accompanied with headache but denies of any nuchal rigidity. Past Medical History:   Diagnosis Date    Anemia     Asthma     BMI 34.0-34.9,adult 5/11/2017    Bowel obstruction     Gall stones     GERD (gastroesophageal reflux disease)     History of blood transfusion     Hypertension     Sickle cell trait ([facility] [address]        Past Surgical History:   Procedure Laterality Date    ABDOMEN SURGERY PROC UNLISTED  2006    surgery for bowel obstruction    HX APPENDECTOMY      HX TUBAL LIGATION           Family History:   Problem Relation Age of Onset    Cancer Mother     Cancer Brother        Social History     Social History    Marital status:      Spouse name: N/A    Number of children: N/A    Years of education: N/A     Occupational History    Not on file. Social History Main Topics    Smoking status: Never Smoker    Smokeless tobacco: Never Used    Alcohol use No    Drug use: No    Sexual activity: Not on file     Other Topics Concern    Not on file     Social History Narrative         ALLERGIES: Iron;  Other medication; Penicillins; Sulfa (sulfonamide antibiotics); and Venofer [iron sucrose]    Review of Systems   Constitutional: Negative for chills, fatigue and fever. HENT: Negative for ear pain, facial swelling, hearing loss, mouth sores, nosebleeds, rhinorrhea, sinus pain, sinus pressure, sore throat, tinnitus and trouble swallowing. Eyes: Negative. Respiratory: Negative for cough, chest tightness, shortness of breath and wheezing. Cardiovascular: Negative for chest pain, palpitations and leg swelling. Gastrointestinal: Positive for nausea. Negative for abdominal pain, constipation, diarrhea and vomiting. Endocrine: Negative. Genitourinary: Negative for difficulty urinating, dysuria, flank pain, frequency, hematuria, pelvic pain, vaginal bleeding, vaginal discharge and vaginal pain. Allergic/Immunologic: Negative. Neurological: Positive for headaches. Negative for dizziness, seizures, syncope, facial asymmetry, speech difficulty, weakness, light-headedness and numbness. Hematological: Negative. Psychiatric/Behavioral: Negative. Vitals:    10/18/17 1617 10/18/17 1630 10/18/17 1730 10/18/17 1830   BP: (!) 154/107 (!) 175/111 (!) 126/94 (!) 181/107   Pulse: 91      Resp: 18      Temp: 98.5 °F (36.9 °C)      SpO2: 97% 99% 96% 100%   Weight: 88.5 kg (195 lb)      Height: 5' 4\" (1.626 m)               Physical Exam   Constitutional: She is oriented to person, place, and time. She appears well-developed and well-nourished. No distress. HENT:   Head: Normocephalic and atraumatic. Eyes: Conjunctivae and EOM are normal. Pupils are equal, round, and reactive to light. Neck: Normal range of motion. Cardiovascular: Normal rate and regular rhythm. Pulmonary/Chest: Effort normal and breath sounds normal. No respiratory distress. She has no wheezes. She has no rales. She exhibits no tenderness. Abdominal: Soft. Bowel sounds are normal. She exhibits no distension. There is no tenderness. There is no rebound and no guarding. Musculoskeletal: Normal range of motion.    Neurological: She is alert and oriented to person, place, and time. Skin: Skin is warm. She is not diaphoretic. Psychiatric: She has a normal mood and affect. Her behavior is normal. Judgment and thought content normal.        MDM  Number of Diagnoses or Management Options  Hypokalemia:   Prolonged Q-T interval on ECG:   Diagnosis management comments: Pt presented with elevated blood pressure which is down since. Noted labs of low potassium with qt prolongation. Given 2 gm magnesium and replacing potassium     7:11 PM Spoke ot Dr Ana Laura Saxena who will accept patient.      ED Course       Procedures    Recent Results (from the past 12 hour(s))   EKG, 12 LEAD, INITIAL    Collection Time: 10/18/17  5:09 PM   Result Value Ref Range    Ventricular Rate 71 BPM    Atrial Rate 71 BPM    P-R Interval 128 ms    QRS Duration 78 ms    Q-T Interval 430 ms    QTC Calculation (Bezet) 467 ms    Calculated P Axis 54 degrees    Calculated R Axis -14 degrees    Calculated T Axis 43 degrees    Diagnosis       Normal sinus rhythm with sinus arrhythmia  Possible Left atrial enlargement  Nonspecific ST abnormality  Abnormal ECG  When compared with ECG of 02-OCT-2017 15:50,  No significant change was found  Confirmed by Angela Haro ([id number]) on 10/18/2017 3:29:89 PM     METABOLIC PANEL, BASIC    Collection Time: 10/18/17  6:00 PM   Result Value Ref Range    Sodium 140 136 - 145 mmol/L    Potassium 2.9 (LL) 3.5 - 5.5 mmol/L    Chloride 101 100 - 108 mmol/L    CO2 31 21 - 32 mmol/L    Anion gap 8 3.0 - 18 mmol/L    Glucose 118 (H) 74 - 99 mg/dL    BUN 7 7.0 - 18 MG/DL    Creatinine 0.83 0.6 - 1.3 MG/DL    BUN/Creatinine ratio 8 (L) 12 - 20      GFR est AA >60 >60 ml/min/1.73m2    GFR est non-AA >60 >60 ml/min/1.73m2    Calcium 9.2 8.5 - 10.1 MG/DL   URINALYSIS W/ RFLX MICROSCOPIC    Collection Time: 10/18/17  6:00 PM   Result Value Ref Range    Color YELLOW      Appearance CLEAR      Specific gravity 1.021 1.005 - 1.030      pH (UA) 5.5 5.0 - 8.0      Protein 100 (A) NEG mg/dL Glucose NEGATIVE  NEG mg/dL    Ketone NEGATIVE  NEG mg/dL    Bilirubin NEGATIVE  NEG      Blood NEGATIVE  NEG      Urobilinogen 1.0 0.2 - 1.0 EU/dL    Nitrites NEGATIVE  NEG      Leukocyte Esterase TRACE (A) NEG     URINE MICROSCOPIC ONLY    Collection Time: 10/18/17  6:00 PM   Result Value Ref Range    WBC 0 to 3 0 - 4 /hpf    RBC 0 to 3 0 - 5 /hpf    Epithelial cells FEW 0 - 5 /lpf    Bacteria FEW (A) NEG /hpf   MAGNESIUM    Collection Time: 10/18/17  6:45 PM   Result Value Ref Range    Magnesium 1.7 1.6 - 2.6 mg/dL       CLINICAL IMPRESSION:    1. Hypokalemia    2.  Prolonged Q-T interval on ECG           Written by Navya Palacios PA-C

## 2017-10-18 NOTE — LETTER
NOTIFICATION RETURN TO WORK / SCHOOL 
 
10/18/2017 9:09 AM 
 
Ms. Lydia Terry [facility] [phone] To Whom It May Concern: 
 
Lydia Terry is currently under the care of [address] [place] [address]. She will return to work on Wednesday 10-25-17 full duty. If there are questions or concerns please have the patient contact our office.  
 
 
 
Sincerely, 
 
 
Segun Feliz PA-C

## 2017-10-19 ENCOUNTER — APPOINTMENT (OUTPATIENT)
Dept: GENERAL RADIOLOGY | Age: 52
DRG: 641 | End: 2017-10-19
Attending: INTERNAL MEDICINE
Payer: OTHER GOVERNMENT

## 2017-10-19 VITALS
HEIGHT: 64 IN | BODY MASS INDEX: 33.29 KG/M2 | TEMPERATURE: 98 F | WEIGHT: 195 LBS | DIASTOLIC BLOOD PRESSURE: 75 MMHG | OXYGEN SATURATION: 96 % | HEART RATE: 78 BPM | RESPIRATION RATE: 18 BRPM | SYSTOLIC BLOOD PRESSURE: 118 MMHG

## 2017-10-19 PROBLEM — R07.9 CHEST PAIN: Status: ACTIVE | Noted: 2017-10-19

## 2017-10-19 LAB
ANION GAP SERPL CALC-SCNC: 8 MMOL/L (ref 3–18)
ATRIAL RATE: 64 BPM
ATTENDING PHYSICIAN, CST07: NORMAL
BUN SERPL-MCNC: 7 MG/DL (ref 7–18)
BUN/CREAT SERPL: 8 (ref 12–20)
CALCIUM SERPL-MCNC: 8.6 MG/DL (ref 8.5–10.1)
CALCULATED P AXIS, ECG09: 56 DEGREES
CALCULATED R AXIS, ECG10: -11 DEGREES
CALCULATED T AXIS, ECG11: 38 DEGREES
CHLORIDE SERPL-SCNC: 104 MMOL/L (ref 100–108)
CK MB CFR SERPL CALC: 0.7 % (ref 0–4)
CK MB CFR SERPL CALC: 0.8 % (ref 0–4)
CK MB CFR SERPL CALC: 0.9 % (ref 0–4)
CK MB SERPL-MCNC: 1.8 NG/ML (ref 5–25)
CK MB SERPL-MCNC: 1.9 NG/ML (ref 5–25)
CK MB SERPL-MCNC: 1.9 NG/ML (ref 5–25)
CK SERPL-CCNC: 220 U/L (ref 26–192)
CK SERPL-CCNC: 243 U/L (ref 26–192)
CK SERPL-CCNC: 252 U/L (ref 26–192)
CO2 SERPL-SCNC: 28 MMOL/L (ref 21–32)
CREAT SERPL-MCNC: 0.84 MG/DL (ref 0.6–1.3)
DIAGNOSIS, 93000: NORMAL
DIAGNOSIS, 93000: NORMAL
DUKE TM SCORE RESULT, CST14: NORMAL
DUKE TREADMILL SCORE, CST13: NORMAL
ECG INTERP BEFORE EX, CST11: NORMAL
ECG INTERP DURING EX, CST12: NORMAL
FUNCTIONAL CAPACITY, CST17: NORMAL
GLUCOSE SERPL-MCNC: 101 MG/DL (ref 74–99)
KNOWN CARDIAC CONDITION, CST08: NORMAL
MAX. DIASTOLIC BP, CST04: 77 MMHG
MAX. HEART RATE, CST05: 105 BPM
MAX. SYSTOLIC BP, CST03: 121 MMHG
OVERALL BP RESPONSE TO EXERCISE, CST16: NORMAL
OVERALL HR RESPONSE TO EXERCISE, CST15: NORMAL
P-R INTERVAL, ECG05: 142 MS
PEAK EX METS, CST10: 1 METS
POTASSIUM SERPL-SCNC: 3.3 MMOL/L (ref 3.5–5.5)
PROTOCOL NAME, CST01: NORMAL
Q-T INTERVAL, ECG07: 462 MS
QRS DURATION, ECG06: 74 MS
QTC CALCULATION (BEZET), ECG08: 476 MS
SODIUM SERPL-SCNC: 140 MMOL/L (ref 136–145)
TEST INDICATION, CST09: NORMAL
TROPONIN I SERPL-MCNC: <0.02 NG/ML (ref 0–0.04)
VENTRICULAR RATE, ECG03: 64 BPM

## 2017-10-19 PROCEDURE — 74011250637 HC RX REV CODE- 250/637: Performed by: INTERNAL MEDICINE

## 2017-10-19 PROCEDURE — A9500 TC99M SESTAMIBI: HCPCS

## 2017-10-19 PROCEDURE — 93017 CV STRESS TEST TRACING ONLY: CPT | Performed by: INTERNAL MEDICINE

## 2017-10-19 PROCEDURE — 80048 BASIC METABOLIC PNL TOTAL CA: CPT | Performed by: INTERNAL MEDICINE

## 2017-10-19 PROCEDURE — 82550 ASSAY OF CK (CPK): CPT | Performed by: INTERNAL MEDICINE

## 2017-10-19 PROCEDURE — 36415 COLL VENOUS BLD VENIPUNCTURE: CPT | Performed by: INTERNAL MEDICINE

## 2017-10-19 PROCEDURE — 74011250637 HC RX REV CODE- 250/637: Performed by: PHYSICIAN ASSISTANT

## 2017-10-19 PROCEDURE — 74011250636 HC RX REV CODE- 250/636: Performed by: INTERNAL MEDICINE

## 2017-10-19 PROCEDURE — 74022 RADEX COMPL AQT ABD SERIES: CPT

## 2017-10-19 PROCEDURE — 93005 ELECTROCARDIOGRAM TRACING: CPT

## 2017-10-19 PROCEDURE — 78452 HT MUSCLE IMAGE SPECT MULT: CPT | Performed by: INTERNAL MEDICINE

## 2017-10-19 RX ORDER — POTASSIUM CHLORIDE 20 MEQ/1
40 TABLET, EXTENDED RELEASE ORAL
Status: COMPLETED | OUTPATIENT
Start: 2017-10-19 | End: 2017-10-19

## 2017-10-19 RX ORDER — ONDANSETRON 2 MG/ML
4 INJECTION INTRAMUSCULAR; INTRAVENOUS
Status: DISCONTINUED | OUTPATIENT
Start: 2017-10-19 | End: 2017-10-19 | Stop reason: HOSPADM

## 2017-10-19 RX ORDER — POTASSIUM CHLORIDE 20 MEQ/1
20 TABLET, EXTENDED RELEASE ORAL
Status: COMPLETED | OUTPATIENT
Start: 2017-10-19 | End: 2017-10-19

## 2017-10-19 RX ORDER — SODIUM CHLORIDE 9 MG/ML
75 INJECTION, SOLUTION INTRAVENOUS CONTINUOUS
Status: DISCONTINUED | OUTPATIENT
Start: 2017-10-19 | End: 2017-10-19 | Stop reason: HOSPADM

## 2017-10-19 RX ORDER — TALC
250 POWDER (GRAM) TOPICAL
Status: COMPLETED | OUTPATIENT
Start: 2017-10-19 | End: 2017-10-19

## 2017-10-19 RX ORDER — HEPARIN SODIUM 5000 [USP'U]/ML
5000 INJECTION, SOLUTION INTRAVENOUS; SUBCUTANEOUS EVERY 8 HOURS
Status: DISCONTINUED | OUTPATIENT
Start: 2017-10-19 | End: 2017-10-19 | Stop reason: HOSPADM

## 2017-10-19 RX ORDER — OXYCODONE AND ACETAMINOPHEN 5; 325 MG/1; MG/1
1 TABLET ORAL
Status: DISCONTINUED | OUTPATIENT
Start: 2017-10-19 | End: 2017-10-19 | Stop reason: HOSPADM

## 2017-10-19 RX ADMIN — POTASSIUM CHLORIDE 20 MEQ: 20 TABLET, EXTENDED RELEASE ORAL at 14:18

## 2017-10-19 RX ADMIN — ONDANSETRON 4 MG: 2 INJECTION INTRAMUSCULAR; INTRAVENOUS at 02:03

## 2017-10-19 RX ADMIN — OXYCODONE HYDROCHLORIDE AND ACETAMINOPHEN 1 TABLET: 5; 325 TABLET ORAL at 15:33

## 2017-10-19 RX ADMIN — SODIUM CHLORIDE 75 ML/HR: 900 INJECTION, SOLUTION INTRAVENOUS at 01:46

## 2017-10-19 RX ADMIN — REGADENOSON 0.4 MG: 0.08 INJECTION, SOLUTION INTRAVENOUS at 08:30

## 2017-10-19 RX ADMIN — OXYCODONE HYDROCHLORIDE AND ACETAMINOPHEN 1 TABLET: 5; 325 TABLET ORAL at 01:40

## 2017-10-19 RX ADMIN — HEPARIN SODIUM 5000 UNITS: 5000 INJECTION, SOLUTION INTRAVENOUS; SUBCUTANEOUS at 01:40

## 2017-10-19 RX ADMIN — POTASSIUM CHLORIDE 40 MEQ: 20 TABLET, EXTENDED RELEASE ORAL at 01:40

## 2017-10-19 RX ADMIN — HEPARIN SODIUM 5000 UNITS: 5000 INJECTION, SOLUTION INTRAVENOUS; SUBCUTANEOUS at 11:55

## 2017-10-19 RX ADMIN — Medication 250 MG: at 14:18

## 2017-10-19 NOTE — NURSE NAVIGATOR
Spoke with patient in room,  Carol Hartmann [phone]) at bedside. The patient confirmed that her address and phone # as stated on face sheet is correct. She stated that she drove prior to this admission and transportation is not an issue as her family is able to provide transportation. She stated that she has no DME's other than a nebulizer at home. She obtains her medications from [address] on [address]. She stated that she was independent with ADL's prior to this admission. She plans to return home upon discharge.

## 2017-10-19 NOTE — DISCHARGE INSTRUCTIONS
DISCHARGE SUMMARY from Nurse    The following personal items are in your possession at time of discharge:    Dental Appliances: None  Visual Aid: Glasses     Home Medications: None  Jewelry: Earrings, Necklace  Clothing: At bedside  Other Valuables: Cell Phone             PATIENT INSTRUCTIONS:    After general anesthesia or intravenous sedation, for 24 hours or while taking prescription Narcotics:  · Limit your activities  · Do not drive and operate hazardous machinery  · Do not make important personal or business decisions  · Do  not drink alcoholic beverages  · If you have not urinated within 8 hours after discharge, please contact your surgeon on call. Report the following to your surgeon:  · Excessive pain, swelling, redness or odor of or around the surgical area  · Temperature over 100.5  · Nausea and vomiting lasting longer than 4 hours or if unable to take medications  · Any signs of decreased circulation or nerve impairment to extremity: change in color, persistent  numbness, tingling, coldness or increase pain  · Any questions        What to do at Home:  Recommended activity: Activity as tolerated. *  Please give a list of your current medications to your Primary Care Provider. *  Please update this list whenever your medications are discontinued, doses are      changed, or new medications (including over-the-counter products) are added. *  Please carry medication information at all times in case of emergency situations. These are general instructions for a healthy lifestyle:    No smoking/ No tobacco products/ Avoid exposure to second hand smoke    Surgeon General's Warning:  Quitting smoking now greatly reduces serious risk to your health.     Obesity, smoking, and sedentary lifestyle greatly increases your risk for illness    A healthy diet, regular physical exercise & weight monitoring are important for maintaining a healthy lifestyle    You may be retaining fluid if you have a history of heart failure or if you experience any of the following symptoms:  Weight gain of 3 pounds or more overnight or 5 pounds in a week, increased swelling in our hands or feet or shortness of breath while lying flat in bed. Please call your doctor as soon as you notice any of these symptoms; do not wait until your next office visit. Recognize signs and symptoms of STROKE:    F-face looks uneven    A-arms unable to move or move unevenly    S-speech slurred or non-existent    T-time-call 911 as soon as signs and symptoms begin-DO NOT go       Back to bed or wait to see if you get better-TIME IS BRAIN. Warning Signs of HEART ATTACK     Call 911 if you have these symptoms:   Chest discomfort. Most heart attacks involve discomfort in the center of the chest that lasts more than a few minutes, or that goes away and comes back. It can feel like uncomfortable pressure, squeezing, fullness, or pain.  Discomfort in other areas of the upper body. Symptoms can include pain or discomfort in one or both arms, the back, neck, jaw, or stomach.  Shortness of breath with or without chest discomfort.  Other signs may include breaking out in a cold sweat, nausea, or lightheadedness. Don't wait more than five minutes to call 911 - MINUTES MATTER! Fast action can save your life. Calling 911 is almost always the fastest way to get lifesaving treatment. Emergency Medical Services staff can begin treatment when they arrive -- up to an hour sooner than if someone gets to the hospital by car. The discharge information has been reviewed with the patient. The patient verbalized understanding. Discharge medications reviewed with the patient and appropriate educational materials and side effects teaching were provided. Hypokalemia: Care Instructions  Your Care Instructions  Hypokalemia (say \"ki-qc-ttp-TANJA-jennifer-uh\") is a low level of potassium.  The heart, muscles, kidneys, and nervous system all need potassium to work well. This problem has many different causes. Kidney problems, diet, and medicines like diuretics and laxatives can cause it. So can vomiting or diarrhea. In some cases, cancer is the cause. Your doctor may do tests to find the cause of your low potassium levels. You may need medicines to bring your potassium levels back to normal. You may also need regular blood tests to check your potassium. If you have very low potassium, you may need intravenous (IV) medicines. You also may need tests to check the electrical activity of your heart. Heart problems caused by low potassium levels can be very serious. Follow-up care is a key part of your treatment and safety. Be sure to make and go to all appointments, and call your doctor if you are having problems. It's also a good idea to know your test results and keep a list of the medicines you take. How can you care for yourself at home? · If your doctor recommends it, eat foods that have a lot of potassium. These include fresh fruits, juices, and vegetables. They also include nuts, beans, and milk. · Be safe with medicines. If your doctor prescribes medicines or potassium supplements, take them exactly as directed. Call your doctor if you have any problems with your medicines. · Get your potassium levels tested as often as your doctor tells you. When should you call for help? Call 911 anytime you think you may need emergency care. For example, call if:  · You feel like your heart is missing beats. Heart problems caused by low potassium can cause death. · You passed out (lost consciousness). · You have a seizure. Call your doctor now or seek immediate medical care if:  · You feel weak or unusually tired. · You have severe arm or leg cramps. · You have tingling or numbness. · You feel sick to your stomach, or you vomit. · You have belly cramps. · You feel bloated or constipated. · You have to urinate a lot.   · You feel very thirsty most of the time. · You are dizzy or lightheaded, or you feel like you may faint. · You feel depressed, or you lose touch with reality. Watch closely for changes in your health, and be sure to contact your doctor if:  · You do not get better as expected. Where can you learn more? Go to http://giselle-pradeep.info/. Enter G358 in the search box to learn more about \"Hypokalemia: Care Instructions. \"  Current as of: July 28, 2016  Content Version: 11.3  © 2211-7617 "[employer]. Care instructions adapted under license by [employer] (which disclaims liability or warranty for this information). If you have questions about a medical condition or this instruction, always ask your healthcare professional. [address] any warranty or liability for your use of this information.

## 2017-10-19 NOTE — DISCHARGE SUMMARY
[facility]  Discharge Summary       Patient: Adrián Osullivan Age: 47 y.o. : 1965 MR#: [id number] SSN: [id number]  PCP on record: Cherie   Admit date: 10/18/2017  Discharge date: 10/19/2017    Disposition:    [x]Home   []Home with Home Health   []SNF/NH   []Rehab   []Home with family   []Alternate Facility:____________________    Discharge Diagnoses:                             1. Prolonged Q-T interval on ECG   2. Hypokalemia  3. Vomiting  4. Atypical chest pain secondary to N/V    Discharge Medications:     Current Discharge Medication List      CONTINUE these medications which have NOT CHANGED    Details   acetaminophen (TYLENOL EXTRA STRENGTH) 500 mg tablet Take  by mouth every six (6) hours as needed for Pain. diphenhydrAMINE (BENADRYL) 25 mg capsule Take 25 mg by mouth every six (6) hours as needed. fluticasone-salmeterol (ADVAIR DISKUS) 250-50 mcg/dose diskus inhaler Take 1 Puff by inhalation every twelve (12) hours. triamcinolone acetonide (KENALOG) 0.1 % ointment Apply  to affected area two (2) times a day. use thin layer  Qty: 30 g, Refills: 3      ergocalciferol (VITAMIN D2) 50,000 unit capsule Take 1 Cap by mouth every seven (7) days. Qty: 12 Cap, Refills: 1      CYANOCOBALAMIN, VITAMIN B-12, (VITAMIN B-12 PO) Take  by mouth. albuterol (PROVENTIL, VENTOLIN) 90 mcg/actuation inhaler Take 1-2 Puffs by inhalation every four (4) hours as needed for Wheezing. Qty: 1 g, Refills: 3             Consults:  None  -   Procedures: Nuclear Stress test  -     Significant Diagnostic Studies: Nuclear stress test  [facility] Course by Problem   1. Borderline Prolonged Q-T interval on ECG: pt presented with QTC of 467 ms on EKG, recheck of EKG, showed QTC of 476. Advised pt to stop taking Zofran due to adverse effect of QT prolongation. Close follow-up with PCP with EKGs. 2. Hypokalemia: repleted while inpatient. Patient takes potassium chronically at home. Continue outpatient. 3. Vomiting: no episodes while inpatient. Tolerated liquid and regular diet. 4. Atypical chest pain secondary to N/V: Pt does not report chest pain before admission, states she had abdominal pain, with NV.  Nuclear stress test done which was normal.     Today's examination of the patient revealed:     Subjective:   Pt states she feels well, she denies any N/V/C/D,   Objective:   VS:   Visit Vitals    /75    Pulse 78    Temp 98 °F (36.7 °C) (Oral)    Resp 18    Ht 5' 4\" (1.626 m)    Wt 88.5 kg (195 lb)    SpO2 96%    Breastfeeding No    BMI 33.47 kg/m2      Tmax/24hrs: Temp (24hrs), Av.3 °F (36.8 °C), Min:98 °F (36.7 °C), Max:98.8 °F (37.1 °C)     Input/Output:   Intake/Output Summary (Last 24 hours) at 10/19/17 1730  Last data filed at 10/19/17 1623   Gross per 24 hour   Intake              990 ml   Output              350 ml   Net              640 ml       General:  Alert, NAD  Cardiovascular:  RRR  Pulmonary:  LSC throughout; respiratory effort WNL  GI:  +BS in all four quadrants, soft, non-tender  Extremities:  No edema; or cyanosis  Additional:      Labs:    Recent Results (from the past 24 hour(s))   METABOLIC PANEL, BASIC    Collection Time: 10/18/17  6:00 PM   Result Value Ref Range    Sodium 140 136 - 145 mmol/L    Potassium 2.9 (LL) 3.5 - 5.5 mmol/L    Chloride 101 100 - 108 mmol/L    CO2 31 21 - 32 mmol/L    Anion gap 8 3.0 - 18 mmol/L    Glucose 118 (H) 74 - 99 mg/dL    BUN 7 7.0 - 18 MG/DL    Creatinine 0.83 0.6 - 1.3 MG/DL    BUN/Creatinine ratio 8 (L) 12 - 20      GFR est AA >60 >60 ml/min/1.73m2    GFR est non-AA >60 >60 ml/min/1.73m2    Calcium 9.2 8.5 - 10.1 MG/DL   URINALYSIS W/ RFLX MICROSCOPIC    Collection Time: 10/18/17  6:00 PM   Result Value Ref Range    Color YELLOW      Appearance CLEAR      Specific gravity 1.021 1.005 - 1.030      pH (UA) 5.5 5.0 - 8.0      Protein 100 (A) NEG mg/dL    Glucose NEGATIVE NEG mg/dL    Ketone NEGATIVE  NEG mg/dL    Bilirubin NEGATIVE  NEG      Blood NEGATIVE  NEG      Urobilinogen 1.0 0.2 - 1.0 EU/dL    Nitrites NEGATIVE  NEG      Leukocyte Esterase TRACE (A) NEG     URINE MICROSCOPIC ONLY    Collection Time: 10/18/17  6:00 PM   Result Value Ref Range    WBC 0 to 3 0 - 4 /hpf    RBC 0 to 3 0 - 5 /hpf    Epithelial cells FEW 0 - 5 /lpf    Bacteria FEW (A) NEG /hpf   MAGNESIUM    Collection Time: 10/18/17  6:45 PM   Result Value Ref Range    Magnesium 1.7 1.6 - 2.6 mg/dL   CARDIAC PANEL,(CK, CKMB & TROPONIN)    Collection Time: 10/19/17  4:10 AM   Result Value Ref Range     (H) 26 - 192 U/L    CK - MB 1.8 <3.6 ng/ml    CK-MB Index 0.7 0.0 - 4.0 %    Troponin-I, Qt. <0.02 0.0 - 0.045 NG/ML   NUCLEAR STRESS TEST    Collection Time: 10/19/17  8:14 AM   Result Value Ref Range    Diagnosis      Test indication Chest Pain     Functional capacity      ECG Interp. Before Exercise abnormal     ECG Interp. During Exercise      Overall HR response to exercise      Overall BP response to exercise      Max. Systolic  mmHg    Max. Diastolic BP 77 mmHg    Max.  Heart rate 105 BPM    [facility] treadmill score      Quiles TM score result      Peak Ex METs 1.0 METS    Protocol name LEXISCAN N/E         Known cardiac condition      Attending physician     CARDIAC PANEL,(CK, CKMB & TROPONIN)    Collection Time: 10/19/17 10:49 AM   Result Value Ref Range     (H) 26 - 192 U/L    CK - MB 1.9 <3.6 ng/ml    CK-MB Index 0.8 0.0 - 4.0 %    Troponin-I, Qt. <0.02 0.0 - 8.405 NG/ML   METABOLIC PANEL, BASIC    Collection Time: 10/19/17 10:49 AM   Result Value Ref Range    Sodium 140 136 - 145 mmol/L    Potassium 3.3 (L) 3.5 - 5.5 mmol/L    Chloride 104 100 - 108 mmol/L    CO2 28 21 - 32 mmol/L    Anion gap 8 3.0 - 18 mmol/L    Glucose 101 (H) 74 - 99 mg/dL    BUN 7 7.0 - 18 MG/DL    Creatinine 0.84 0.6 - 1.3 MG/DL    BUN/Creatinine ratio 8 (L) 12 - 20      GFR est AA >60 >60 ml/min/1.73m2    GFR est non-AA >60 >60 ml/min/1.73m2    Calcium 8.6 8.5 - 10.1 MG/DL   EKG, 12 LEAD, SUBSEQUENT    Collection Time: 10/19/17 12:33 PM   Result Value Ref Range    Ventricular Rate 64 BPM    Atrial Rate 64 BPM    P-R Interval 142 ms    QRS Duration 74 ms    Q-T Interval 462 ms    QTC Calculation (Bezet) 476 ms    Calculated P Axis 56 degrees    Calculated R Axis -11 degrees    Calculated T Axis 38 degrees    Diagnosis       Normal sinus rhythm  Possible Left atrial enlargement  Low voltage QRS  Cannot rule out Anterior infarct (cited on or before 19-OCT-2017)  Abnormal ECG  When compared with ECG of 19-OCT-2017 08:16,  No significant change was found  Confirmed by Yvette Alvarez ([id number]) on 10/19/2017 2:36:01 PM     CARDIAC PANEL,(CK, CKMB & TROPONIN)    Collection Time: 10/19/17  1:20 PM   Result Value Ref Range     (H) 26 - 192 U/L    CK - MB 1.9 <3.6 ng/ml    CK-MB Index 0.9 0.0 - 4.0 %    Troponin-I, Qt. <0.02 0.0 - 0.045 NG/ML     Additional Data Reviewed:     Condition:   Follow-up Appointments:   1.  Your PCP: Leda Rollins DO, within 7-10days    >30 minutes spent coordinating this discharge (review instructions/follow-up, prescriptions, preparing report for sign off)    Signed:  Elenita Mayen PA-C  10/19/2017  5:30 PM

## 2017-10-19 NOTE — PROGRESS NOTES
Patient is not available to be assessed at this time.      [address] [place] [address]   Board Certified [address] [place] [address]   [phone]

## 2017-10-19 NOTE — PROCEDURES
[address] STRESS    Name:  Katey Joyner  MR#:  [id number]  :  1965  Account #:  [de-identified]  Date of Adm:  10/18/2017  Date of Service:  10/19/2017      REASON FOR EVALUATION: Chest pain. The patient had a Lexiscan stress test and following the findings on  EKG portion of the test.    Baseline to baseline electrocardiogram shows sinus rhythm, poor R-  wave progression, heart rate 64, blood pressure 121/77. PROCEDURE: The patient was injected with 0.4 mg of IV Lexiscan via  left antecubital vein. The patient had no significant symptoms. Heart  rate increased from baseline to a heart rate of 100. Blood pressure  decreased to 111/76. Electrocardiogram showed no significant ST-T  changes or arrhythmia during the procedure. DIAGNOSES  1. Negative EKG portion of Lexiscan stress test.  2. Nuclear imaging report to follow. NUCLEAR IMAGING REPORT    PROCEDURE: Following IV Lexiscan stress via left antecubital vein,  33 mCi of technetium sestamibi was injected and post-stress images  were obtained. Resting images were obtained with 11 mCi of  technetium sestamibi. Images were compared. IMPRESSION  1. Post-stress imaging in the short axis, horizontal and vertical long  axis reveals a normal perfusion uptake in all areas. 2. Resting images also showed normal perfusion uptake in all areas. 3. Gated images showed normal left ventricular size, wall motion and  systolic function, ejection fraction 71%. DIAGNOSES  1. Normal scan. 2. No evidence of significant fixed or reversible defects suggestive of  ischemia or myocardial infarction noted from this nuclear study. 3. Low risk scan.     Thank you for this referral.        MD ARSEN Lazaro / Carrie Mcgovern  D:  10/19/2017   13:48  T:  10/19/2017   15:45  Job #:  [id number]

## 2017-10-19 NOTE — H&P
History & Physical    Patient: Thong Lerma MRN: [id number]  CSN: [id number]    YOB: 1965  Age: 47 y.o. Sex: female      DOA: 10/18/2017    Chief Complaint:   Chief Complaint   Patient presents with    Hypertension          HPI:     Thong Lerma is a 47 y.o.  female who presents with nausea vomittinng and diarrhea since Monday ; also [facility] with unusaually high  Blood pressure She is post trigger finger surgyery and due to blood pressure  removl of stiche s from trigger surgery left ring finger postponed. She was went to [place] ER for und to be hypokalemic with electrolyte replacement  Also found to have prolonged qt no significant arrhythmia hisory     Past Medical History:   Diagnosis Date    Anemia     Asthma     BMI 34.0-34.9,adult 5/11/2017    Bowel obstruction     Gall stones     GERD (gastroesophageal reflux disease)     History of blood transfusion     Hypertension     Sickle cell trait ([address]        Past Surgical History:   Procedure Laterality Date    ABDOMEN SURGERY PROC UNLISTED  2006    surgery for bowel obstruction    HX APPENDECTOMY      HX TUBAL LIGATION         Family History   Problem Relation Age of Onset    Cancer Mother     Cancer Brother        Social History     Social History    Marital status:      Spouse name: N/A    Number of children: N/A    Years of education: N/A     Social History Main Topics    Smoking status: Never Smoker    Smokeless tobacco: Never Used    Alcohol use No    Drug use: No    Sexual activity: Not Asked     Other Topics Concern    None     Social History Narrative       Prior to Admission medications    Medication Sig Start Date End Date Taking? Authorizing Provider   acetaminophen (TYLENOL EXTRA STRENGTH) 500 mg tablet Take  by mouth every six (6) hours as needed for Pain.    Yes Historical Provider   ondansetron (ZOFRAN ODT) 4 mg disintegrating tablet Take 1 Tab by mouth every eight (8) hours as needed for Nausea. 1/23/17  Yes Curt Ogden MD   diphenhydrAMINE (BENADRYL) 25 mg capsule Take 25 mg by mouth every six (6) hours as needed. Yes Romain Campos MD   fluticasone-salmeterol (ADVAIR DISKUS) 250-50 mcg/dose diskus inhaler Take 1 Puff by inhalation every twelve (12) hours. Yes Romain Campos MD   triamcinolone acetonide (KENALOG) 0.1 % ointment Apply  to affected area two (2) times a day. use thin layer 4/3/15  Yes Pito Perdomo MD   ergocalciferol (VITAMIN D2) 50,000 unit capsule Take 1 Cap by mouth every seven (7) days. 6/27/14  Yes Karla Mckinley NP   CYANOCOBALAMIN, VITAMIN B-12, (VITAMIN B-12 PO) Take  by mouth. Yes Romain Campos MD   albuterol (PROVENTIL, VENTOLIN) 90 mcg/actuation inhaler Take 1-2 Puffs by inhalation every four (4) hours as needed for Wheezing. 1/3/13  Yes Severiano Cramp, MD       Allergies   Allergen Reactions    Iron Anaphylaxis    Other Medication Nausea and Vomiting     All \"mycins\"    Penicillins Rash    Sulfa (Sulfonamide Antibiotics) Hives    Venofer [Iron Sucrose] Rash and Itching         Review of Systems  GENERAL: Patient alert, awake and oriented times 3, able to communicate full sentences and not in distress. HEENT: No change in vision, no earache, tinnitus, sore throat or sinus congestion. NECK: No pain or stiffness. PULMONARY: No shortness of breath, cough or wheeze. Cardiovascular: no pnd or orthopnea, no CP  GASTROINTESTINAL: No abdominal pain, nausea, vomiting or diarrhea, melena or bright red blood per rectum. GENITOURINARY: No urinary frequency, urgency, hesitancy or dysuria. MUSCULOSKELETAL: No joint or muscle pain, no back pain, no recent trauma. DERMATOLOGIC: No rash, no itching, no lesions. ENDOCRINE: No polyuria, polydipsia, no heat or cold intolerance. No recent change in weight. HEMATOLOGICAL: No anemia or easy bruising or bleeding. NEUROLOGIC: No headache, seizures, numbness, tingling or weakness. Physical Exam:     Physical Exam:  Visit Vitals    /72    Pulse 84    Temp 98.2 °F (36.8 °C)    Resp 20    Ht 5' 4\" (1.626 m)    Wt 88.5 kg (195 lb)    SpO2 95%    Breastfeeding No    BMI 33.47 kg/m2      O2 Device: Room air    Temp (24hrs), Av.6 °F (35.9 °C), Min:93 °F (33.9 °C), Max:98.5 °F (36.9 °C)             General:  Alert, cooperative, no distress, appears stated age. Head: Normocephalic, without obvious abnormality, atraumatic. Eyes:  Conjunctivae/corneas clear. PERRL, EOMs intact. Nose: Nares normal. No drainage or sinus tenderness. Neck: Supple, symmetrical, trachea midline, no adenopathy, thyroid: no enlargement, no carotid bruit and no JVD. Lungs:   Clear to auscultation bilaterally. Heart:  Regular rate and rhythm, S1, S2 normal.     Abdomen: Soft, non-tender. Bowel sounds normal.    Extremities: Extremities normal, atraumatic, no cyanosis or edema. Pulses: 2+ and symmetric all extremities. Skin:  No rashes or lesions   Neurologic: AAOx3, No focal motor or sensory deficit. Labs Reviewed: All lab results for the last 24 hours reviewed.    Recent Results (from the past 24 hour(s))   EKG, 12 LEAD, INITIAL    Collection Time: 10/18/17  5:09 PM   Result Value Ref Range    Ventricular Rate 71 BPM    Atrial Rate 71 BPM    P-R Interval 128 ms    QRS Duration 78 ms    Q-T Interval 430 ms    QTC Calculation (Bezet) 467 ms    Calculated P Axis 54 degrees    Calculated R Axis -14 degrees    Calculated T Axis 43 degrees    Diagnosis       Normal sinus rhythm with sinus arrhythmia  Possible Left atrial enlargement  Nonspecific ST abnormality  Abnormal ECG  When compared with ECG of 02-OCT-2017 15:50,  No significant change was found  Confirmed by Nolia Sandhoff [id number]) on 10/18/2017 3:59:57 PM     METABOLIC PANEL, BASIC    Collection Time: 10/18/17  6:00 PM   Result Value Ref Range    Sodium 140 136 - 145 mmol/L    Potassium 2.9 (LL) 3.5 - 5.5 mmol/L Chloride 101 100 - 108 mmol/L    CO2 31 21 - 32 mmol/L    Anion gap 8 3.0 - 18 mmol/L    Glucose 118 (H) 74 - 99 mg/dL    BUN 7 7.0 - 18 MG/DL    Creatinine 0.83 0.6 - 1.3 MG/DL    BUN/Creatinine ratio 8 (L) 12 - 20      GFR est AA >60 >60 ml/min/1.73m2    GFR est non-AA >60 >60 ml/min/1.73m2    Calcium 9.2 8.5 - 10.1 MG/DL   URINALYSIS W/ RFLX MICROSCOPIC    Collection Time: 10/18/17  6:00 PM   Result Value Ref Range    Color YELLOW      Appearance CLEAR      Specific gravity 1.021 1.005 - 1.030      pH (UA) 5.5 5.0 - 8.0      Protein 100 (A) NEG mg/dL    Glucose NEGATIVE  NEG mg/dL    Ketone NEGATIVE  NEG mg/dL    Bilirubin NEGATIVE  NEG      Blood NEGATIVE  NEG      Urobilinogen 1.0 0.2 - 1.0 EU/dL    Nitrites NEGATIVE  NEG      Leukocyte Esterase TRACE (A) NEG     URINE MICROSCOPIC ONLY    Collection Time: 10/18/17  6:00 PM   Result Value Ref Range    WBC 0 to 3 0 - 4 /hpf    RBC 0 to 3 0 - 5 /hpf    Epithelial cells FEW 0 - 5 /lpf    Bacteria FEW (A) NEG /hpf   MAGNESIUM    Collection Time: 10/18/17  6:45 PM   Result Value Ref Range    Magnesium 1.7 1.6 - 2.6 mg/dL     Recent Results (from the past 24 hour(s))   EKG, 12 LEAD, INITIAL    Collection Time: 10/18/17  5:09 PM   Result Value Ref Range    Ventricular Rate 71 BPM    Atrial Rate 71 BPM    P-R Interval 128 ms    QRS Duration 78 ms    Q-T Interval 430 ms    QTC Calculation (Bezet) 467 ms    Calculated P Axis 54 degrees    Calculated R Axis -14 degrees    Calculated T Axis 43 degrees    Diagnosis       Normal sinus rhythm with sinus arrhythmia  Possible Left atrial enlargement  Nonspecific ST abnormality  Abnormal ECG  When compared with ECG of 02-OCT-2017 15:50,  No significant change was found  Confirmed by Rhiannon Cabello [id number]) on 10/18/2017 2:53:53 PM     METABOLIC PANEL, BASIC    Collection Time: 10/18/17  6:00 PM   Result Value Ref Range    Sodium 140 136 - 145 mmol/L    Potassium 2.9 (LL) 3.5 - 5.5 mmol/L    Chloride 101 100 - 108 mmol/L    CO2 31 21 - 32 mmol/L    Anion gap 8 3.0 - 18 mmol/L    Glucose 118 (H) 74 - 99 mg/dL    BUN 7 7.0 - 18 MG/DL    Creatinine 0.83 0.6 - 1.3 MG/DL    BUN/Creatinine ratio 8 (L) 12 - 20      GFR est AA >60 >60 ml/min/1.73m2    GFR est non-AA >60 >60 ml/min/1.73m2    Calcium 9.2 8.5 - 10.1 MG/DL   URINALYSIS W/ RFLX MICROSCOPIC    Collection Time: 10/18/17  6:00 PM   Result Value Ref Range    Color YELLOW      Appearance CLEAR      Specific gravity 1.021 1.005 - 1.030      pH (UA) 5.5 5.0 - 8.0      Protein 100 (A) NEG mg/dL    Glucose NEGATIVE  NEG mg/dL    Ketone NEGATIVE  NEG mg/dL    Bilirubin NEGATIVE  NEG      Blood NEGATIVE  NEG      Urobilinogen 1.0 0.2 - 1.0 EU/dL    Nitrites NEGATIVE  NEG      Leukocyte Esterase TRACE (A) NEG     URINE MICROSCOPIC ONLY    Collection Time: 10/18/17  6:00 PM   Result Value Ref Range    WBC 0 to 3 0 - 4 /hpf    RBC 0 to 3 0 - 5 /hpf    Epithelial cells FEW 0 - 5 /lpf    Bacteria FEW (A) NEG /hpf   MAGNESIUM    Collection Time: 10/18/17  6:45 PM   Result Value Ref Range    Magnesium 1.7 1.6 - 2.6 mg/dL    and EKG    Procedures/imaging: see electronic medical records for all procedures/Xrays and details which were not copied into this note but were reviewed prior to creation of Plan      Assessment/Plan     Principal Problem:    Prolonged Q-T interval on ECG (10/18/2017)    Active Problems:        Atypical chest pain (2/1/2014)      Hypokalemia (2/1/2014)      Chest pain (10/19/2017)         DVT/GI Prophylaxis: Hep SQ    Discussed with patient at bedside about hospital admission and my plan care, who understood and agree with my plan care.     Jensen Stokes MD  10/19/2017 12:32 AM

## 2017-10-19 NOTE — PROGRESS NOTES
NUTRITION    Nursing Referral: [facility]     RECOMMENDATIONS / PLAN:     - Start po diet as medically appropriate pending GI function.  - Continue IV fluids until adequate oral intake. - Continue RD inpatient monitoring and evaluation. NUTRITION INTERVENTIONS & DIAGNOSIS:     [x] IV Fluids: NS at 75 mL/hr    Nutrition Diagnosis: Inadequate oral intake related to inability to consume po diet as evidenced by possible altered GI function with n/v and diarrhea, currently NPO. ASSESSMENT:     Reports episode of vomiting and diarrhea starting Wednesday night. No recent n/v or diarrhea since admission. Reports tolerating foods well prior to Wednesday night with a good appetite. Hypokalemia noted; replacemed. Average po intake adequate to meet patients estimated nutritional needs:   [] Yes     [x] No   [] Unable to determine at this time    Diet: DIET NPO      Food Allergies: NKFA  Current Appetite:   [] Good     [x] Fair     [] Poor     [x] Other: NPO  Appetite/meal intake prior to admission:   [x] Good     [] Fair     [] Poor     [] Other:  Feeding Limitations:  [] Swallowing difficulty    [] Chewing difficulty    [] Other:  Current Meal Intake: No data found.       BM:  10/18  Skin Integrity: surgical incision to trigger finger from previous admission  Edema: none   Pertinent Medications: Reviewed    Recent Labs      10/19/17   1049  10/18/17   1845  10/18/17   1800   NA  140   --   140   K  3.3*   --   2.9*   CL  104   --   101   CO2  28   --   31   GLU  101*   --   118*   BUN  7   --   7   CREA  0.84   --   0.83   CA  8.6   --   9.2   MG   --   1.7   --        Intake/Output Summary (Last 24 hours) at 10/19/17 1158  Last data filed at 10/19/17 0654   Gross per 24 hour   Intake              510 ml   Output              350 ml   Net              160 ml       Anthropometrics:  Ht Readings from Last 1 Encounters:   10/18/17 5' 4\" (1.626 m)     Last 3 Recorded Weights in this Encounter    10/18/17 1617   Weight: 88.5 kg (195 lb)     Body mass index is 33.47 kg/(m^2). Obese, class I    Weight History: Denies weight change PTA    Weight Metrics 10/18/2017 10/18/2017 10/16/2017 10/12/2017 10/10/2017 9/14/2017 8/12/2017   Weight 195 lb 195 lb 12.8 oz 176 lb 200 lb 9.6 oz 199 lb 9.6 oz 199 lb 12.8 oz 179 lb   BMI 33.47 kg/m2 33.61 kg/m2 30.21 kg/m2 34.43 kg/m2 34.26 kg/m2 34.3 kg/m2 30.73 kg/m2        Admitting Diagnosis: Hypokalemia  Prolonged Q-T interval on ECG  Chest pain  Pertinent PMHx: bowel obstruction, GERD, HTN    Education Needs:        [x] None identified  [] Identified - Not appropriate at this time  []  Identified and addressed - refer to education log  Learning Limitations:   [x] None identified  [] Identified    Cultural, Synagogue & ethnic food preferences:  [x] None identified    [] Identified and addressed     ESTIMATED NUTRITION NEEDS:     Calories: 9108-0770 kcal (MSJx1.2-1.3) based on  [x] Actual BW  89 kg     [] IBW   Protein: 71-89 gm (0.8-1 gm/kg) based on  [x] Actual BW      [] IBW   Fluid: 1 mL/kcal     MONITORING & EVALUATION:     Nutrition Goal(s):   1. Nutritional needs will be met through adequate oral intake or nutrition support within the next 7 days.   Outcome:  [] Met/Ongoing    []  Not Met    [x] New/Initial Goal      Monitoring:   [x] Diet tolerance   [] Meal intake   [] Supplement intake   [x] GI symptoms/ability to tolerate po diet   [] Respiratory status   [] Plan of care      Previous Recommendations (for follow-up assessments only):     []   Implemented       []   Not Implemented (RD to address)     [] No Recommendation Made     Discharge Planning: low sodium diet, pending diet tolerance    [x] Participated in care planning, discharge planning, & interdisciplinary rounds as appropriate      Jeremiah Sahu, [address]    Pager: [phone]

## 2017-10-23 ENCOUNTER — OFFICE VISIT (OUTPATIENT)
Dept: ORTHOPEDIC SURGERY | Facility: CLINIC | Age: 52
End: 2017-10-23

## 2017-10-23 VITALS
BODY MASS INDEX: 34.76 KG/M2 | WEIGHT: 203.6 LBS | DIASTOLIC BLOOD PRESSURE: 91 MMHG | TEMPERATURE: 97.8 F | HEIGHT: 64 IN | HEART RATE: 82 BPM | OXYGEN SATURATION: 97 % | RESPIRATION RATE: 18 BRPM | SYSTOLIC BLOOD PRESSURE: 134 MMHG

## 2017-10-23 DIAGNOSIS — Z98.890 S/P TRIGGER FINGER RELEASE: Primary | ICD-10-CM

## 2017-10-23 DIAGNOSIS — Z48.02 ENCOUNTER FOR REMOVAL OF SUTURES: ICD-10-CM

## 2017-10-23 NOTE — LETTER
NOTIFICATION RETURN TO WORK / SCHOOL 
 
10/23/2017 10:32 AM 
 
Ms. Jacklyn Castleman [place] [id number] To Whom It May Concern: 
 
Jacklyn Castleman is currently under the care of [address] [place] [address]. She will return to work/school on: 10/25/17. Able to return to work with full duty and no restrictions. If there are questions or concerns please have the patient contact our office.  
 
 
 
Sincerely, 
 
 
Evette Tran PA-C

## 2017-10-23 NOTE — MR AVS SNAPSHOT
Visit Information Date & Time Provider Department Dept. Phone Encounter #  
 10/23/2017 10:00 AM Dixie Lozano, [address] [facility] [phone] [id number] Upcoming Health Maintenance Date Due Hepatitis C Screening 1965 PAP AKA CERVICAL CYTOLOGY 7/25/1985 BREAST CANCER SCRN MAMMOGRAM 7/25/2014 FOBT Q 1 YEAR AGE 50-75 7/25/2014 INFLUENZA AGE 9 TO ADULT 8/1/2017 DTaP/Tdap/Td series (2 - Td) 9/16/2024 Allergies as of 10/23/2017  Review Complete On: 10/23/2017 By: Alonzo Munoz Severity Noted Reaction Type Reactions Iron High 01/03/2013   Systemic Anaphylaxis Other Medication High 03/19/2015   Systemic Nausea and Vomiting All \"mycins\" Penicillins  01/03/2013   Topical Rash  
 Sulfa (Sulfonamide Antibiotics)  01/03/2013   Topical Hives Venofer [Iron Sucrose]  04/04/2013    Rash, Itching Current Immunizations  Reviewed on 8/11/2016 Name Date Tdap 9/16/2014  7:02 PM  
  
 Not reviewed this visit Vitals BP Pulse Temp Resp Height(growth percentile) Weight(growth percentile) (!) 134/91 82 97.8 °F (36.6 °C) (Oral) 18 5' 4\" (1.626 m) 203 lb 9.6 oz (92.4 kg) SpO2 BMI OB Status Smoking Status 97% 34.95 kg/m2 Menopause Never Smoker BMI and BSA Data Body Mass Index Body Surface Area 34.95 kg/m 2 2.04 m 2 Preferred Pharmacy Pharmacy Name Phone [facility] [address] [place] [address],[address] [place] [address] [phone] Your Updated Medication List  
  
   
This list is accurate as of: 10/23/17 10:31 AM.  Always use your most recent med list.  
  
  
  
  
 Jeremy Phi 250-50 mcg/dose diskus inhaler Generic drug:  fluticasone-salmeterol Take 1 Puff by inhalation every twelve (12) hours. albuterol 90 mcg/actuation inhaler Commonly known as:  Mariely Bachelor Take 1-2 Puffs by inhalation every four (4) hours as needed for Wheezing. BENADRYL 25 mg capsule Generic drug:  diphenhydrAMINE Take 25 mg by mouth every six (6) hours as needed. ergocalciferol 50,000 unit capsule Commonly known as:  VITAMIN D2 Take 1 Cap by mouth every seven (7) days. ondansetron 4 mg disintegrating tablet Commonly known as:  ZOFRAN ODT Take 1 Tab by mouth every eight (8) hours as needed for Nausea. triamcinolone acetonide 0.1 % ointment Commonly known as:  KENALOG Apply  to affected area two (2) times a day. use thin layer TYLENOL EXTRA STRENGTH 500 mg tablet Generic drug:  acetaminophen Take  by mouth every six (6) hours as needed for Pain. VITAMIN B-12 PO Take  by mouth. Introducing hospitals & HEALTH SERVICES! Colt Mckinney introduces [employer] patient portal. Now you can access parts of your medical record, email your doctor's office, and request medication refills online. 1. In your internet browser, go to [email or web address] 2. Click on the First Time User? Click Here link in the Sign In box. You will see the New Member Sign Up page. 3. Enter your [employer] Access Code exactly as it appears below. You will not need to use this code after youve completed the sign-up process. If you do not sign up before the expiration date, you must request a new code. · [employer] Access Code: [email or web address] Expires: 10/25/2017  6:49 PM 
 
4. Enter the last four digits of your Social Security Number (xxxx) and Date of Birth (mm/dd/yyyy) as indicated and click Submit. You will be taken to the next sign-up page. 5. Create a [employer] ID. This will be your [employer] login ID and cannot be changed, so think of one that is secure and easy to remember. 6. Create a [employer] password. You can change your password at any time. 7. Enter your Password Reset Question and Answer. This can be used at a later time if you forget your password. 8. Enter your e-mail address.  You will receive e-mail notification when new information is available in [employer]. 9. Click Sign Up. You can now view and download portions of your medical record. 10. Click the Download Summary menu link to download a portable copy of your medical information. If you have questions, please visit the Frequently Asked Questions section of the [employer] website. Remember, [employer] is NOT to be used for urgent needs. For medical emergencies, dial 911. Now available from your iPhone and Android! Please provide this summary of care documentation to your next provider. Your primary care clinician is listed as Trung Pascual. If you have any questions after today's visit, please call [phone].

## 2017-10-23 NOTE — PROGRESS NOTES
HISTORY OF PRESENT ILLNESS:  Kasey Garnett returns for wound check to her left hand. She is 12 days status post her left ring finger open trigger release. She is doing well today. Since her last office visit, she was admitted urgently to [facility] secondary to issues that resulted from food poisoning one week prior. She spent three days in the hospital and required IV rehydration and emesis treatment. Today, she reports doing very well. Her strength has returned nicely. She feels better. She is tracking with the public health department the cause. The facility that served her and caused her food poisoning issue. PHYSICAL EXAM:  Regarding her left hand, she has no triggering associated with the ring finger. The wound borders of the surgical site have continued to mature nicely. The wound borders are well approximated. Dried, bloody drainage is noted throughout, as well as Methylene blue marker staining used preoperatively to identify the surgical site. PROCEDURE:  Vertical mattress sutures were removed with no complications. A sterile dressing was placed. PLAN:   The patient may shower and bathe normally. She will avoid any prolonged soaking of the left hand. 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 She is going to return to work without any restrictions on Wednesday upcoming, October 25, 2017. We will see her back on a p.r.n. basis.

## 2017-11-06 ENCOUNTER — OFFICE VISIT (OUTPATIENT)
Dept: ORTHOPEDIC SURGERY | Facility: CLINIC | Age: 52
End: 2017-11-06

## 2017-11-06 VITALS
RESPIRATION RATE: 18 BRPM | BODY MASS INDEX: 33.97 KG/M2 | HEIGHT: 64 IN | OXYGEN SATURATION: 96 % | TEMPERATURE: 99.2 F | HEART RATE: 109 BPM | DIASTOLIC BLOOD PRESSURE: 98 MMHG | WEIGHT: 199 LBS | SYSTOLIC BLOOD PRESSURE: 144 MMHG

## 2017-11-06 DIAGNOSIS — Z48.89 ENCOUNTER FOR POSTOPERATIVE WOUND CHECK: Primary | ICD-10-CM

## 2017-11-06 DIAGNOSIS — Z98.890 S/P TRIGGER FINGER RELEASE: ICD-10-CM

## 2017-11-06 NOTE — PROGRESS NOTES
HISTORY OF PRESENT ILLNESS:  Leann Tucker returns 26 days status post her left ring finger trigger release, open, under the care of Dr. Lance Carrizales. She was seen on October 23, 2017. Her surgical site was healing nicely, and she was sent back to work full duty. She has avoided any prolonged soaking of the surgical site. She noticed two days ago a malodor developing from the hand. She presents today for wound check. PHYSICAL EXAM:  The volar aspect of the left hand reveals 2.5 cm proximal from the ring finger metacarpophalangeal joint reveals a 2-cm transverse incision, nontender. The skin is dry and callused superficially. Sterile forceps was used to tease the dry, callused skin to reveal new healthy skin underneath. There is no evidence of wound dehiscence or infection. There are no malodors. There is no evidence of purulence or erythema. The patient has full range of motion of all digits of the left hand with no palm-to-pulp deficit. Her extension is full. PLAN:   The patient will followup on a p.r.n. basis. All her questions were answered to her satisfaction.

## 2018-02-17 ENCOUNTER — HOSPITAL ENCOUNTER (EMERGENCY)
Age: 53
Discharge: HOME | End: 2018-02-17
Attending: EMERGENCY MEDICINE | Admitting: EMERGENCY MEDICINE
Payer: OTHER GOVERNMENT

## 2018-02-17 ENCOUNTER — APPOINTMENT (OUTPATIENT)
Dept: CT IMAGING | Age: 53
End: 2018-02-17
Attending: EMERGENCY MEDICINE
Payer: OTHER GOVERNMENT

## 2018-02-17 VITALS
HEART RATE: 84 BPM | WEIGHT: 185 LBS | DIASTOLIC BLOOD PRESSURE: 105 MMHG | RESPIRATION RATE: 18 BRPM | OXYGEN SATURATION: 94 % | HEIGHT: 64 IN | SYSTOLIC BLOOD PRESSURE: 177 MMHG | BODY MASS INDEX: 31.58 KG/M2 | TEMPERATURE: 98 F

## 2018-02-17 DIAGNOSIS — T78.40XA ALLERGIC REACTION, INITIAL ENCOUNTER: Primary | ICD-10-CM

## 2018-02-17 DIAGNOSIS — R11.15 NON-INTRACTABLE CYCLICAL VOMITING WITH NAUSEA: ICD-10-CM

## 2018-02-17 LAB
ALBUMIN SERPL-MCNC: 3.8 G/DL (ref 3.4–5)
ALBUMIN/GLOB SERPL: 0.7 {RATIO} (ref 0.8–1.7)
ALP SERPL-CCNC: 200 U/L (ref 45–117)
ALT SERPL-CCNC: 123 U/L (ref 13–56)
ANION GAP SERPL CALC-SCNC: 10 MMOL/L (ref 3–18)
AST SERPL-CCNC: 287 U/L (ref 15–37)
BASOPHILS # BLD: 0 K/UL (ref 0–0.06)
BASOPHILS NFR BLD: 0 % (ref 0–2)
BILIRUB SERPL-MCNC: 0.4 MG/DL (ref 0.2–1)
BUN SERPL-MCNC: 9 MG/DL (ref 7–18)
BUN/CREAT SERPL: 11 (ref 12–20)
CALCIUM SERPL-MCNC: 9.6 MG/DL (ref 8.5–10.1)
CHLORIDE SERPL-SCNC: 103 MMOL/L (ref 100–108)
CO2 SERPL-SCNC: 26 MMOL/L (ref 21–32)
CREAT SERPL-MCNC: 0.79 MG/DL (ref 0.6–1.3)
DIFFERENTIAL METHOD BLD: ABNORMAL
EOSINOPHIL # BLD: 0.1 K/UL (ref 0–0.4)
EOSINOPHIL NFR BLD: 1 % (ref 0–5)
ERYTHROCYTE [DISTWIDTH] IN BLOOD BY AUTOMATED COUNT: 13.6 % (ref 11.6–14.5)
GLOBULIN SER CALC-MCNC: 5.1 G/DL (ref 2–4)
GLUCOSE SERPL-MCNC: 129 MG/DL (ref 74–99)
HCT VFR BLD AUTO: 44.5 % (ref 35–45)
HGB BLD-MCNC: 14.7 G/DL (ref 12–16)
LYMPHOCYTES # BLD: 1.5 K/UL (ref 0.9–3.6)
LYMPHOCYTES NFR BLD: 21 % (ref 21–52)
MCH RBC QN AUTO: 26.1 PG (ref 24–34)
MCHC RBC AUTO-ENTMCNC: 33 G/DL (ref 31–37)
MCV RBC AUTO: 78.9 FL (ref 74–97)
MONOCYTES # BLD: 0.6 K/UL (ref 0.05–1.2)
MONOCYTES NFR BLD: 8 % (ref 3–10)
NEUTS SEG # BLD: 5 K/UL (ref 1.8–8)
NEUTS SEG NFR BLD: 70 % (ref 40–73)
PLATELET # BLD AUTO: 277 K/UL (ref 135–420)
PMV BLD AUTO: 11 FL (ref 9.2–11.8)
POTASSIUM SERPL-SCNC: 4.2 MMOL/L (ref 3.5–5.5)
PROT SERPL-MCNC: 8.9 G/DL (ref 6.4–8.2)
RBC # BLD AUTO: 5.64 M/UL (ref 4.2–5.3)
SODIUM SERPL-SCNC: 139 MMOL/L (ref 136–145)
WBC # BLD AUTO: 7.2 K/UL (ref 4.6–13.2)

## 2018-02-17 PROCEDURE — 74177 CT ABD & PELVIS W/CONTRAST: CPT

## 2018-02-17 PROCEDURE — 96374 THER/PROPH/DIAG INJ IV PUSH: CPT

## 2018-02-17 PROCEDURE — 74011636320 HC RX REV CODE- 636/320: Performed by: EMERGENCY MEDICINE

## 2018-02-17 PROCEDURE — 96361 HYDRATE IV INFUSION ADD-ON: CPT

## 2018-02-17 PROCEDURE — 80053 COMPREHEN METABOLIC PANEL: CPT | Performed by: EMERGENCY MEDICINE

## 2018-02-17 PROCEDURE — 74011250636 HC RX REV CODE- 250/636: Performed by: EMERGENCY MEDICINE

## 2018-02-17 PROCEDURE — 85025 COMPLETE CBC W/AUTO DIFF WBC: CPT | Performed by: EMERGENCY MEDICINE

## 2018-02-17 PROCEDURE — 99284 EMERGENCY DEPT VISIT MOD MDM: CPT

## 2018-02-17 PROCEDURE — 74011000250 HC RX REV CODE- 250: Performed by: EMERGENCY MEDICINE

## 2018-02-17 PROCEDURE — 74011000258 HC RX REV CODE- 258: Performed by: EMERGENCY MEDICINE

## 2018-02-17 PROCEDURE — 96375 TX/PRO/DX INJ NEW DRUG ADDON: CPT

## 2018-02-17 PROCEDURE — 74011250636 HC RX REV CODE- 250/636

## 2018-02-17 PROCEDURE — 96376 TX/PRO/DX INJ SAME DRUG ADON: CPT

## 2018-02-17 RX ORDER — ONDANSETRON 2 MG/ML
4 INJECTION INTRAMUSCULAR; INTRAVENOUS
Status: COMPLETED | OUTPATIENT
Start: 2018-02-17 | End: 2018-02-17

## 2018-02-17 RX ORDER — PREDNISONE 50 MG/1
50 TABLET ORAL DAILY
Qty: 3 TAB | Refills: 0 | Status: SHIPPED | OUTPATIENT
Start: 2018-02-17 | End: 2018-02-20

## 2018-02-17 RX ORDER — METOCLOPRAMIDE HYDROCHLORIDE 5 MG/ML
10 INJECTION INTRAMUSCULAR; INTRAVENOUS ONCE
Status: COMPLETED | OUTPATIENT
Start: 2018-02-17 | End: 2018-02-17

## 2018-02-17 RX ORDER — EPINEPHRINE 0.1 MG/ML
0.3 INJECTION INTRACARDIAC; INTRAVENOUS
Status: DISCONTINUED | OUTPATIENT
Start: 2018-02-17 | End: 2018-02-18 | Stop reason: HOSPADM

## 2018-02-17 RX ORDER — PROMETHAZINE HYDROCHLORIDE 25 MG/1
25 TABLET ORAL
Qty: 12 TAB | Refills: 0 | Status: SHIPPED | OUTPATIENT
Start: 2018-02-17 | End: 2018-03-24

## 2018-02-17 RX ORDER — FAMOTIDINE 20 MG/1
20 TABLET, FILM COATED ORAL 2 TIMES DAILY
Qty: 10 TAB | Refills: 0 | Status: SHIPPED | OUTPATIENT
Start: 2018-02-17 | End: 2018-02-22

## 2018-02-17 RX ORDER — TRAMADOL HYDROCHLORIDE 50 MG/1
50 TABLET ORAL
Qty: 10 TAB | Refills: 0 | Status: SHIPPED | OUTPATIENT
Start: 2018-02-17 | End: 2018-03-24

## 2018-02-17 RX ORDER — FAMOTIDINE 10 MG/ML
20 INJECTION INTRAVENOUS
Status: COMPLETED | OUTPATIENT
Start: 2018-02-17 | End: 2018-02-17

## 2018-02-17 RX ORDER — MORPHINE SULFATE 2 MG/ML
4 INJECTION, SOLUTION INTRAMUSCULAR; INTRAVENOUS
Status: COMPLETED | OUTPATIENT
Start: 2018-02-17 | End: 2018-02-17

## 2018-02-17 RX ORDER — METOCLOPRAMIDE HYDROCHLORIDE 5 MG/ML
5 INJECTION INTRAMUSCULAR; INTRAVENOUS EVERY 6 HOURS
Status: DISCONTINUED | OUTPATIENT
Start: 2018-02-17 | End: 2018-02-17

## 2018-02-17 RX ORDER — PREDNISONE 20 MG/1
20 TABLET ORAL DAILY
Qty: 3 TAB | Refills: 0 | Status: SHIPPED | OUTPATIENT
Start: 2018-02-17 | End: 2018-02-20

## 2018-02-17 RX ORDER — PROMETHAZINE HYDROCHLORIDE 25 MG/ML
25 INJECTION, SOLUTION INTRAMUSCULAR; INTRAVENOUS
Status: DISCONTINUED | OUTPATIENT
Start: 2018-02-17 | End: 2018-02-18 | Stop reason: HOSPADM

## 2018-02-17 RX ORDER — EPINEPHRINE 1 MG/ML
INJECTION, SOLUTION, CONCENTRATE INTRAVENOUS
Status: COMPLETED
Start: 2018-02-17 | End: 2018-02-17

## 2018-02-17 RX ORDER — DIPHENHYDRAMINE HYDROCHLORIDE 50 MG/ML
25 INJECTION, SOLUTION INTRAMUSCULAR; INTRAVENOUS
Status: COMPLETED | OUTPATIENT
Start: 2018-02-17 | End: 2018-02-17

## 2018-02-17 RX ADMIN — FAMOTIDINE 20 MG: 10 INJECTION, SOLUTION INTRAVENOUS at 15:33

## 2018-02-17 RX ADMIN — IOPAMIDOL 100 ML: 612 INJECTION, SOLUTION INTRAVENOUS at 17:31

## 2018-02-17 RX ADMIN — PROMETHAZINE HYDROCHLORIDE 25 MG: 25 INJECTION INTRAMUSCULAR; INTRAVENOUS at 19:38

## 2018-02-17 RX ADMIN — METHYLPREDNISOLONE SODIUM SUCCINATE 125 MG: 125 INJECTION, POWDER, FOR SOLUTION INTRAMUSCULAR; INTRAVENOUS at 15:35

## 2018-02-17 RX ADMIN — Medication 4 MG: at 16:57

## 2018-02-17 RX ADMIN — ONDANSETRON HYDROCHLORIDE 4 MG: 2 INJECTION, SOLUTION INTRAMUSCULAR; INTRAVENOUS at 15:31

## 2018-02-17 RX ADMIN — SODIUM CHLORIDE 1000 ML: 900 INJECTION, SOLUTION INTRAVENOUS at 14:54

## 2018-02-17 RX ADMIN — METOCLOPRAMIDE 10 MG: 5 INJECTION, SOLUTION INTRAMUSCULAR; INTRAVENOUS at 16:57

## 2018-02-17 RX ADMIN — EPINEPHRINE 0.3 MG: 1 INJECTION, SOLUTION, CONCENTRATE INTRAVENOUS at 15:20

## 2018-02-17 RX ADMIN — DIPHENHYDRAMINE HYDROCHLORIDE 25 MG: 50 INJECTION, SOLUTION INTRAMUSCULAR; INTRAVENOUS at 15:24

## 2018-02-17 RX ADMIN — SODIUM CHLORIDE 1000 ML: 900 INJECTION, SOLUTION INTRAVENOUS at 20:00

## 2018-02-17 RX ADMIN — ONDANSETRON HYDROCHLORIDE 4 MG: 2 INJECTION, SOLUTION INTRAMUSCULAR; INTRAVENOUS at 14:59

## 2018-02-17 NOTE — ED TRIAGE NOTES
Pt reports since 1000 today, has had 20 episodes of vomiting and 2 episodes of diarrhea. Pt reports generalized abdominal pain.

## 2018-02-17 NOTE — ED PROVIDER NOTES
EMERGENCY DEPARTMENT HISTORY AND PHYSICAL EXAM    3:10 PM      Date: 2/17/2018  Patient Name: Alfie Edmonds    History of Presenting Illness     Chief Complaint   Patient presents with    Abdominal Pain    Vomiting    Diarrhea         History Provided By: Patient    Chief Complaint: Abdominal Pain; Nausea; Vomiting; Diarrhea   Duration:  Hours  Timing:  Worsening  Location: Generalized   Quality: N/A  Severity: N/A  Modifying Factors: None   Associated Symptoms: Rash; Swelling of lips       Additional History (Context): Alfie Edmonds is a 47 y.o. female with PMHx of HTN, asthma, GERD, and anemia presenting to the ED c/o acute onset of nausea, vomiting, diarrhea, and generalized abdominal pain that started after eating a piece of pizza 5 hours ago. States symptoms have worsened. Pt also notes swelling of lips and states her face \"broke out in hives. \" Reports no modifying factors for her symptoms. Reports she had an allergic reaction to scallops previously but it was different because she was not vomiting. Denies any other symptoms or complaints. PCP: Hilary West, DO    Current Outpatient Prescriptions   Medication Sig Dispense Refill    predniSONE (DELTASONE) 20 mg tablet Take 1 Tab by mouth daily for 3 days. With Breakfast 3 Tab 0    traMADol (ULTRAM) 50 mg tablet Take 1 Tab by mouth every six (6) hours as needed for Pain. Max Daily Amount: 200 mg. 10 Tab 0    famotidine (PEPCID) 20 mg tablet Take 1 Tab by mouth two (2) times a day for 5 days. 10 Tab 0    predniSONE (DELTASONE) 50 mg tablet Take 1 Tab by mouth daily for 3 days. 3 Tab 0    promethazine (PHENERGAN) 25 mg tablet Take 1 Tab by mouth every six (6) hours as needed for Nausea. Caution: This medication may make you drowsy. Avoid driving while under the influence of this medication. 12 Tab 0    acetaminophen (TYLENOL EXTRA STRENGTH) 500 mg tablet Take  by mouth every six (6) hours as needed for Pain.       ondansetron (ZOFRAN ODT) 4 mg disintegrating tablet Take 1 Tab by mouth every eight (8) hours as needed for Nausea. 14 Tab 0    diphenhydrAMINE (BENADRYL) 25 mg capsule Take 25 mg by mouth every six (6) hours as needed.  fluticasone-salmeterol (ADVAIR DISKUS) 250-50 mcg/dose diskus inhaler Take 1 Puff by inhalation every twelve (12) hours.  triamcinolone acetonide (KENALOG) 0.1 % ointment Apply  to affected area two (2) times a day. use thin layer 30 g 3    ergocalciferol (VITAMIN D2) 50,000 unit capsule Take 1 Cap by mouth every seven (7) days. 12 Cap 1    CYANOCOBALAMIN, VITAMIN B-12, (VITAMIN B-12 PO) Take  by mouth.  albuterol (PROVENTIL, VENTOLIN) 90 mcg/actuation inhaler Take 1-2 Puffs by inhalation every four (4) hours as needed for Wheezing. 1 g 3       Past History     Past Medical History:  Past Medical History:   Diagnosis Date    Anemia     Asthma     BMI 34.0-34.9,adult 5/11/2017    Bowel obstruction     Gall stones     GERD (gastroesophageal reflux disease)     History of blood transfusion     Hypertension     Sickle cell trait ([facility] [address]        Past Surgical History:  Past Surgical History:   Procedure Laterality Date    ABDOMEN SURGERY PROC UNLISTED  2006    surgery for bowel obstruction    HX APPENDECTOMY      HX TUBAL LIGATION         Family History:  Family History   Problem Relation Age of Onset    Cancer Mother     Cancer Brother        Social History:  Social History   Substance Use Topics    Smoking status: Never Smoker    Smokeless tobacco: Never Used    Alcohol use No       Allergies: Allergies   Allergen Reactions    Iron Anaphylaxis    Other Medication Nausea and Vomiting     All \"mycins\"    Penicillins Rash    Sulfa (Sulfonamide Antibiotics) Hives    Venofer [Iron Sucrose] Rash and Itching         Review of Systems       Review of Systems   Constitutional: Negative for chills and fever. HENT: Positive for facial swelling (lips).     Respiratory: Negative for shortness of breath. Cardiovascular: Negative for chest pain. Gastrointestinal: Positive for abdominal pain, diarrhea, nausea and vomiting. Skin: Positive for rash. All other systems reviewed and are negative. Physical Exam     Visit Vitals    BP (!) 177/105 (BP 1 Location: Right arm, BP Patient Position: At rest)    Pulse 84    Temp 98 °F (36.7 °C)    Resp 18    Ht 5' 4\" (1.626 m)    Wt 83.9 kg (185 lb)    SpO2 94%    BMI 31.76 kg/m2         Physical Exam   Constitutional: She is oriented to person, place, and time. She appears well-developed and well-nourished. No distress. HENT:   Head: Normocephalic and atraumatic. Multiple lesions on nose, bilateral cheeks, and right eye. Left lower lip edema. Mild edema at base of uvula. Eyes: Conjunctivae and EOM are normal. Right eye exhibits no discharge. Left eye exhibits no discharge. No scleral icterus. Neck: Normal range of motion. Neck supple. No tracheal deviation present. Cardiovascular: Normal rate, regular rhythm and normal heart sounds. No murmur heard. Pulmonary/Chest: Effort normal and breath sounds normal. Tachypnea (mild) noted. No respiratory distress. She has no wheezes. She has no rales. Abdominal: Soft. She exhibits no distension. There is no tenderness. There is no rebound and no guarding. Musculoskeletal: Normal range of motion. She exhibits no edema or deformity. Neurological: She is alert and oriented to person, place, and time. No cranial nerve deficit. Skin: Skin is warm and dry. She is not diaphoretic. Psychiatric: She has a normal mood and affect. Her behavior is normal. Judgment and thought content normal.         Diagnostic Study Results     Labs -  No results found for this or any previous visit (from the past 12 hour(s)). Radiologic Studies -   CT ABD PELV W CONT   Final Result   IMPRESSION:  1. No acute process appreciated.     2.  Likely fibroids within the uterus.          Medical Decision Making   I am the first provider for this patient. I reviewed the vital signs, available nursing notes, past medical history, past surgical history, family history and social history. Vital Signs-Reviewed the patient's vital signs. 10:10 AM : Pt care transferred to Dr. Dimple Infante  ,ED provider. History of patient complaint(s), available diagnostic reports and current treatment plan has been discussed thoroughly. Pending diagnostics reports and/or labs (please list): symptom improvement    Provider Notes (Medical Decision Making): Pt with likely allergic reaction with persistent vomiting. Now 5 hours since epi administration. Offered pt admission, she prefers to try to control sx and go home. Lip swelling resolved, no hives,, no resp issues. SIgned out pending symptoms improvement. Diagnosis     Clinical Impression:   1. Allergic reaction, initial encounter    2. Non-intractable cyclical vomiting with nausea        Disposition: pending    Follow-up Information     Follow up With Details Comments Giorgio Stanley DO Call in 2 days For Follow Up [address] [facility] [id number] [facility] [address] EMERGENCY DEPT Go to As needed, If symptoms worsen [address] [facility]  [phone]           Discharge Medication List as of 2/17/2018 10:25 PM      START taking these medications    Details   predniSONE (DELTASONE) 20 mg tablet Take 1 Tab by mouth daily for 3 days. With Breakfast, Normal, Disp-3 Tab, R-0      traMADol (ULTRAM) 50 mg tablet Take 1 Tab by mouth every six (6) hours as needed for Pain. Max Daily Amount: 200 mg., Print, Disp-10 Tab, R-0      famotidine (PEPCID) 20 mg tablet Take 1 Tab by mouth two (2) times a day for 5 days. , Print, Disp-10 Tab, R-0         CONTINUE these medications which have NOT CHANGED    Details   acetaminophen (TYLENOL EXTRA STRENGTH) 500 mg tablet Take  by mouth every six (6) hours as needed for Pain., Historical Med      ondansetron (ZOFRAN ODT) 4 mg disintegrating tablet Take 1 Tab by mouth every eight (8) hours as needed for Nausea. , Print, Disp-14 Tab, R-0      diphenhydrAMINE (BENADRYL) 25 mg capsule Take 25 mg by mouth every six (6) hours as needed., Historical Med      fluticasone-salmeterol (ADVAIR DISKUS) 250-50 mcg/dose diskus inhaler Take 1 Puff by inhalation every twelve (12) hours. , Historical Med      triamcinolone acetonide (KENALOG) 0.1 % ointment Apply  to affected area two (2) times a day. use thin layer, Normal, Disp-30 g, R-3      ergocalciferol (VITAMIN D2) 50,000 unit capsule Take 1 Cap by mouth every seven (7) days. , Normal, Disp-12 Cap, R-1      CYANOCOBALAMIN, VITAMIN B-12, (VITAMIN B-12 PO) Take  by mouth., Historical Med      albuterol (PROVENTIL, VENTOLIN) 90 mcg/actuation inhaler Take 1-2 Puffs by inhalation every four (4) hours as needed for Wheezing., Print, Disp-1 g, R-3           _______________________________    Attestations:  Scribe Attestation     Serg Mcmillan acting as a scribe for and in the presence of Christine Santamaria MD      February 19, 2018 at 10:09 AM       Provider Attestation:      I personally performed the services described in the documentation, reviewed the documentation, as recorded by the scribe in my presence, and it accurately and completely records my words and actions. February 19, 2018 at 10:09 AM - Christine Santamaria MD    _______________________________    7:13 PM: Pt care transferred to Dr. Donald Syed, ED provider. History of patient complaint(s), available diagnostic reports and current treatment plan has been discussed thoroughly. Bedside rounding on patient occured : yes .   Intended disposition of patient : TBD  Pending diagnostics reports and/or labs (please list): symptom control

## 2018-02-19 ENCOUNTER — HOSPITAL ENCOUNTER (EMERGENCY)
Age: 53
Discharge: HOME | End: 2018-02-19
Attending: EMERGENCY MEDICINE
Payer: OTHER GOVERNMENT

## 2018-02-19 VITALS
HEIGHT: 64 IN | RESPIRATION RATE: 18 BRPM | BODY MASS INDEX: 30.73 KG/M2 | TEMPERATURE: 98.8 F | OXYGEN SATURATION: 98 % | SYSTOLIC BLOOD PRESSURE: 116 MMHG | DIASTOLIC BLOOD PRESSURE: 72 MMHG | WEIGHT: 180 LBS | HEART RATE: 80 BPM

## 2018-02-19 DIAGNOSIS — R11.15 NON-INTRACTABLE CYCLICAL VOMITING WITH NAUSEA: Primary | ICD-10-CM

## 2018-02-19 LAB
ALBUMIN SERPL-MCNC: 3.8 G/DL (ref 3.4–5)
ALBUMIN/GLOB SERPL: 0.7 {RATIO} (ref 0.8–1.7)
ALP SERPL-CCNC: 158 U/L (ref 45–117)
ALT SERPL-CCNC: 61 U/L (ref 13–56)
ANION GAP SERPL CALC-SCNC: 11 MMOL/L (ref 3–18)
APPEARANCE UR: CLEAR
AST SERPL-CCNC: 43 U/L (ref 15–37)
BACTERIA URNS QL MICRO: ABNORMAL /HPF
BASOPHILS # BLD: 0 K/UL (ref 0–0.06)
BASOPHILS NFR BLD: 0 % (ref 0–2)
BILIRUB SERPL-MCNC: 0.5 MG/DL (ref 0.2–1)
BILIRUB UR QL: NEGATIVE
BUN SERPL-MCNC: 8 MG/DL (ref 7–18)
BUN/CREAT SERPL: 8 (ref 12–20)
CALCIUM SERPL-MCNC: 9.5 MG/DL (ref 8.5–10.1)
CHLORIDE SERPL-SCNC: 99 MMOL/L (ref 100–108)
CO2 SERPL-SCNC: 28 MMOL/L (ref 21–32)
COLOR UR: YELLOW
CREAT SERPL-MCNC: 1.03 MG/DL (ref 0.6–1.3)
DIFFERENTIAL METHOD BLD: ABNORMAL
EOSINOPHIL # BLD: 0 K/UL (ref 0–0.4)
EOSINOPHIL NFR BLD: 0 % (ref 0–5)
EPITH CASTS URNS QL MICRO: ABNORMAL /LPF (ref 0–5)
ERYTHROCYTE [DISTWIDTH] IN BLOOD BY AUTOMATED COUNT: 13.9 % (ref 11.6–14.5)
GLOBULIN SER CALC-MCNC: 5.3 G/DL (ref 2–4)
GLUCOSE SERPL-MCNC: 124 MG/DL (ref 74–99)
GLUCOSE UR STRIP.AUTO-MCNC: NEGATIVE MG/DL
HCT VFR BLD AUTO: 47.8 % (ref 35–45)
HGB BLD-MCNC: 15.9 G/DL (ref 12–16)
HGB UR QL STRIP: NEGATIVE
KETONES UR QL STRIP.AUTO: NEGATIVE MG/DL
LEUKOCYTE ESTERASE UR QL STRIP.AUTO: ABNORMAL
LIPASE SERPL-CCNC: 106 U/L (ref 73–393)
LYMPHOCYTES # BLD: 1.9 K/UL (ref 0.9–3.6)
LYMPHOCYTES NFR BLD: 19 % (ref 21–52)
MCH RBC QN AUTO: 25.9 PG (ref 24–34)
MCHC RBC AUTO-ENTMCNC: 33.3 G/DL (ref 31–37)
MCV RBC AUTO: 78 FL (ref 74–97)
MONOCYTES # BLD: 0.7 K/UL (ref 0.05–1.2)
MONOCYTES NFR BLD: 7 % (ref 3–10)
NEUTS SEG # BLD: 7.4 K/UL (ref 1.8–8)
NEUTS SEG NFR BLD: 74 % (ref 40–73)
NITRITE UR QL STRIP.AUTO: NEGATIVE
PH UR STRIP: 6 [PH] (ref 5–8)
PLATELET # BLD AUTO: 306 K/UL (ref 135–420)
PMV BLD AUTO: 10.8 FL (ref 9.2–11.8)
POTASSIUM SERPL-SCNC: 3.2 MMOL/L (ref 3.5–5.5)
PROT SERPL-MCNC: 9.1 G/DL (ref 6.4–8.2)
PROT UR STRIP-MCNC: 100 MG/DL
RBC # BLD AUTO: 6.13 M/UL (ref 4.2–5.3)
RBC #/AREA URNS HPF: ABNORMAL /HPF (ref 0–5)
SODIUM SERPL-SCNC: 138 MMOL/L (ref 136–145)
SP GR UR REFRACTOMETRY: 1.02 (ref 1–1.03)
UROBILINOGEN UR QL STRIP.AUTO: 1 EU/DL (ref 0.2–1)
WBC # BLD AUTO: 10.1 K/UL (ref 4.6–13.2)
WBC URNS QL MICRO: ABNORMAL /HPF (ref 0–4)

## 2018-02-19 PROCEDURE — 96374 THER/PROPH/DIAG INJ IV PUSH: CPT

## 2018-02-19 PROCEDURE — 85025 COMPLETE CBC W/AUTO DIFF WBC: CPT | Performed by: PHYSICIAN ASSISTANT

## 2018-02-19 PROCEDURE — 83690 ASSAY OF LIPASE: CPT | Performed by: PHYSICIAN ASSISTANT

## 2018-02-19 PROCEDURE — 74011250636 HC RX REV CODE- 250/636: Performed by: EMERGENCY MEDICINE

## 2018-02-19 PROCEDURE — 99283 EMERGENCY DEPT VISIT LOW MDM: CPT

## 2018-02-19 PROCEDURE — 81001 URINALYSIS AUTO W/SCOPE: CPT | Performed by: PHYSICIAN ASSISTANT

## 2018-02-19 PROCEDURE — 96361 HYDRATE IV INFUSION ADD-ON: CPT

## 2018-02-19 PROCEDURE — 80053 COMPREHEN METABOLIC PANEL: CPT | Performed by: PHYSICIAN ASSISTANT

## 2018-02-19 RX ORDER — ONDANSETRON 2 MG/ML
4 INJECTION INTRAMUSCULAR; INTRAVENOUS
Status: COMPLETED | OUTPATIENT
Start: 2018-02-19 | End: 2018-02-19

## 2018-02-19 RX ORDER — ONDANSETRON 8 MG/1
8 TABLET, ORALLY DISINTEGRATING ORAL
Qty: 10 TAB | Refills: 0 | Status: ON HOLD | OUTPATIENT
Start: 2018-02-19 | End: 2018-03-24

## 2018-02-19 RX ADMIN — SODIUM CHLORIDE 1000 ML: 900 INJECTION, SOLUTION INTRAVENOUS at 16:39

## 2018-02-19 RX ADMIN — ONDANSETRON HYDROCHLORIDE 4 MG: 2 INJECTION, SOLUTION INTRAMUSCULAR; INTRAVENOUS at 16:39

## 2018-02-19 NOTE — LETTER
NOTIFICATION RETURN TO WORK  
 
2/19/2018 5:38 PM 
 
Ms. Cheri Vargas [facility] [id number] To Whom It May Concern: 
 
Cheri Vargas is currently under the care of [id number] [facility] EMERGENCY DEPT. She will return to work on: 2/22/2018 If there are questions or concerns please have the patient contact our office.  
 
 
 
Sincerely, 
 
 
 
 
 
Jessy Bryan MD

## 2018-02-19 NOTE — DISCHARGE INSTRUCTIONS
Nausea and Vomiting: Care Instructions  Your Care Instructions    When you are nauseated, you may feel weak and sweaty and notice a lot of saliva in your mouth. Nausea often leads to vomiting. Most of the time you do not need to worry about nausea and vomiting, but they can be signs of other illnesses. Two common causes of nausea and vomiting are stomach flu and food poisoning. Nausea and vomiting from viral stomach flu will usually start to improve within 24 hours. Nausea and vomiting from food poisoning may last from 12 to 48 hours. The doctor has checked you carefully, but problems can develop later. If you notice any problems or new symptoms, get medical treatment right away. Follow-up care is a key part of your treatment and safety. Be sure to make and go to all appointments, and call your doctor if you are having problems. It's also a good idea to know your test results and keep a list of the medicines you take. How can you care for yourself at home? · To prevent dehydration, drink plenty of fluids, enough so that your urine is light yellow or clear like water. Choose water and other caffeine-free clear liquids until you feel better. If you have kidney, heart, or liver disease and have to limit fluids, talk with your doctor before you increase the amount of fluids you drink. · Rest in bed until you feel better. · When you are able to eat, try clear soups, mild foods, and liquids until all symptoms are gone for 12 to 48 hours. Other good choices include dry toast, crackers, cooked cereal, and gelatin dessert, such as Jell-O. When should you call for help? Call 911 anytime you think you may need emergency care. For example, call if:  ? · You passed out (lost consciousness). ?Call your doctor now or seek immediate medical care if:  ? · You have symptoms of dehydration, such as:  ¨ Dry eyes and a dry mouth. ¨ Passing only a little dark urine.   ¨ Feeling thirstier than usual.   ? · You have new or worsening belly pain. ? · You have a new or higher fever. ? · You vomit blood or what looks like coffee grounds. ? Watch closely for changes in your health, and be sure to contact your doctor if:  ? · You have ongoing nausea and vomiting. ? · Your vomiting is getting worse. ? · Your vomiting lasts longer than 2 days. ? · You are not getting better as expected. Where can you learn more? Go to http://giselle-pradeep.info/. Enter 25 [id number] in the search box to learn more about \"Nausea and Vomiting: Care Instructions. \"  Current as of: March 20, 2017  Content Version: 11.4  © 3902-8522 [employer]. Care instructions adapted under license by [employer] (which disclaims liability or warranty for this information). If you have questions about a medical condition or this instruction, always ask your healthcare professional. [address] any warranty or liability for your use of this information.

## 2018-02-19 NOTE — ED NOTES
I performed a brief evaluation, including history and physical, of the patient here in triage and I have determined that pt will need further treatment and evaluation from the main side ER physician. I have placed initial orders to help in expediting patients care.      February 19, 2018 at 4:04 PM - ESTER Leonard        Visit Vitals    BP (!) 132/93 (BP 1 Location: Left arm)    Pulse (!) 102    Temp 98.8 °F (37.1 °C)    Resp 19    Ht 5' 4\" (1.626 m)    Wt 81.6 kg (180 lb)    SpO2 96%    BMI 30.9 kg/m2

## 2018-02-19 NOTE — ED TRIAGE NOTES
Pt c/o abdominal pain, nausea and vomiting since Saturday, was seen here for the same symptoms, getting worse

## 2018-02-19 NOTE — ED PROVIDER NOTES
EMERGENCY DEPARTMENT HISTORY AND PHYSICAL EXAM    4:16 PM      Date: 2/19/2018  Patient Name: Tommy Barcenas    History of Presenting Illness     Chief Complaint   Patient presents with    Vomiting    Abdominal Pain         History Provided By: Patient and Patient's     Chief Complaint: Abdomen Pain  Duration: 2 Days  Timing:  Constant  Location: Diffuse  Quality:   Severity: Severe  Modifying Factors: after eating pizza. Pepcid and Nausea medication  Associated Symptoms: nausea, emesis, headache, fever. Denies CP or SOB      Additional History (Context): Tommy Barcenas is a 47 y.o. female with hx of Asthma, GERND, HTN, Gall stones, Bowel Obstruction, Tubal Ligation, and Appendectomy who presents with c/o constant severe diffuse abdomen pain onset 2 days after eating pizza. Pt states she ate a slice of pizza and felt a \"knot\" in her abdomen before running to the bathroom with onset of nausea, emesis, and diarrhea. Pt states diarrhea has resolved but continues to have nausea and emesis along with headache and fever. Denies CP or SOB. Pt reports taking Pepcid and nausea medication without symptom relief.  states eating a slice of the same pizza yesterday without sx. PCP: Stan Hong, DO    Current Outpatient Prescriptions   Medication Sig Dispense Refill    predniSONE (DELTASONE) 20 mg tablet Take 1 Tab by mouth daily for 3 days. With Breakfast 3 Tab 0    traMADol (ULTRAM) 50 mg tablet Take 1 Tab by mouth every six (6) hours as needed for Pain. Max Daily Amount: 200 mg. 10 Tab 0    famotidine (PEPCID) 20 mg tablet Take 1 Tab by mouth two (2) times a day for 5 days. 10 Tab 0    predniSONE (DELTASONE) 50 mg tablet Take 1 Tab by mouth daily for 3 days. 3 Tab 0    promethazine (PHENERGAN) 25 mg tablet Take 1 Tab by mouth every six (6) hours as needed for Nausea. Caution: This medication may make you drowsy. Avoid driving while under the influence of this medication.  12 Tab 0    acetaminophen (TYLENOL EXTRA STRENGTH) 500 mg tablet Take  by mouth every six (6) hours as needed for Pain.  ondansetron (ZOFRAN ODT) 4 mg disintegrating tablet Take 1 Tab by mouth every eight (8) hours as needed for Nausea. 14 Tab 0    diphenhydrAMINE (BENADRYL) 25 mg capsule Take 25 mg by mouth every six (6) hours as needed.  fluticasone-salmeterol (ADVAIR DISKUS) 250-50 mcg/dose diskus inhaler Take 1 Puff by inhalation every twelve (12) hours.  triamcinolone acetonide (KENALOG) 0.1 % ointment Apply  to affected area two (2) times a day. use thin layer 30 g 3    ergocalciferol (VITAMIN D2) 50,000 unit capsule Take 1 Cap by mouth every seven (7) days. 12 Cap 1    CYANOCOBALAMIN, VITAMIN B-12, (VITAMIN B-12 PO) Take  by mouth.  albuterol (PROVENTIL, VENTOLIN) 90 mcg/actuation inhaler Take 1-2 Puffs by inhalation every four (4) hours as needed for Wheezing. 1 g 3       Past History     Past Medical History:  Past Medical History:   Diagnosis Date    Anemia     Asthma     BMI 34.0-34.9,adult 5/11/2017    Bowel obstruction     Gall stones     GERD (gastroesophageal reflux disease)     History of blood transfusion     Hypertension     Sickle cell trait ([facility] [address]        Past Surgical History:  Past Surgical History:   Procedure Laterality Date    ABDOMEN SURGERY PROC UNLISTED  2006    surgery for bowel obstruction    HX APPENDECTOMY      HX TUBAL LIGATION         Family History:  Family History   Problem Relation Age of Onset    Cancer Mother     Cancer Brother        Social History:  Social History   Substance Use Topics    Smoking status: Never Smoker    Smokeless tobacco: Never Used    Alcohol use No       Allergies:   Allergies   Allergen Reactions    Iron Anaphylaxis    Other Medication Nausea and Vomiting     All \"mycins\"    Penicillins Rash    Sulfa (Sulfonamide Antibiotics) Hives    Venofer [Iron Sucrose] Rash and Itching         Review of Systems       Review of Systems   Constitutional: Positive for fever. Eyes: Negative for visual disturbance. Respiratory: Negative for shortness of breath. Cardiovascular: Negative for chest pain and leg swelling. Gastrointestinal: Positive for abdominal pain, diarrhea, nausea and vomiting. Negative for blood in stool. Genitourinary: Negative for dysuria and flank pain. Musculoskeletal: Negative for arthralgias and neck pain. Skin: Negative for rash. Neurological: Positive for headaches. Hematological: Does not bruise/bleed easily. Psychiatric/Behavioral: Negative for confusion. All other systems reviewed and are negative. Physical Exam     Visit Vitals    /72 (BP 1 Location: Left arm, BP Patient Position: At rest;Supine)    Pulse 80    Temp 98.8 °F (37.1 °C)    Resp 18    Ht 5' 4\" (1.626 m)    Wt 81.6 kg (180 lb)    SpO2 98%    BMI 30.9 kg/m2         Physical Exam   Constitutional: She is oriented to person, place, and time. She appears well-developed and well-nourished. No distress. HENT:   Head: Normocephalic and atraumatic. Right Ear: External ear normal.   Left Ear: External ear normal.   Nose: Nose normal.   Mouth/Throat: Oropharynx is clear and moist.   Eyes: Conjunctivae and EOM are normal. Pupils are equal, round, and reactive to light. No scleral icterus. Neck: Normal range of motion. Neck supple. No JVD present. No tracheal deviation present. No thyromegaly present. Cardiovascular: Normal rate, regular rhythm, normal heart sounds and intact distal pulses. Exam reveals no gallop and no friction rub. No murmur heard. Pulmonary/Chest: Effort normal and breath sounds normal. She exhibits no tenderness. Abdominal: Soft. Bowel sounds are normal. She exhibits no distension. There is no tenderness. There is no rebound and no guarding. Musculoskeletal: Normal range of motion. She exhibits no edema or tenderness. Lymphadenopathy:     She has no cervical adenopathy. Neurological: She is alert and oriented to person, place, and time. No cranial nerve deficit. Coordination normal.   No sensory loss, Gait normal, Motor 5/5   Skin: Skin is warm and dry. Psychiatric: She has a normal mood and affect. Her behavior is normal. Judgment and thought content normal.   Nursing note and vitals reviewed. Diagnostic Study Results     Labs -  Recent Results (from the past 12 hour(s))   URINALYSIS W/ RFLX MICROSCOPIC    Collection Time: 02/19/18  4:20 PM   Result Value Ref Range    Color YELLOW      Appearance CLEAR      Specific gravity 1.017 1.005 - 1.030      pH (UA) 6.0 5.0 - 8.0      Protein 100 (A) NEG mg/dL    Glucose NEGATIVE  NEG mg/dL    Ketone NEGATIVE  NEG mg/dL    Bilirubin NEGATIVE  NEG      Blood NEGATIVE  NEG      Urobilinogen 1.0 0.2 - 1.0 EU/dL    Nitrites NEGATIVE  NEG      Leukocyte Esterase MODERATE (A) NEG     CBC WITH AUTOMATED DIFF    Collection Time: 02/19/18  4:20 PM   Result Value Ref Range    WBC 10.1 4.6 - 13.2 K/uL    RBC 6.13 (H) 4.20 - 5.30 M/uL    HGB 15.9 12.0 - 16.0 g/dL    HCT 47.8 (H) 35.0 - 45.0 %    MCV 78.0 74.0 - 97.0 FL    MCH 25.9 24.0 - 34.0 PG    MCHC 33.3 31.0 - 37.0 g/dL    RDW 13.9 11.6 - 14.5 %    PLATELET 518 736 - 956 K/uL    MPV 10.8 9.2 - 11.8 FL    NEUTROPHILS 74 (H) 40 - 73 %    LYMPHOCYTES 19 (L) 21 - 52 %    MONOCYTES 7 3 - 10 %    EOSINOPHILS 0 0 - 5 %    BASOPHILS 0 0 - 2 %    ABS. NEUTROPHILS 7.4 1.8 - 8.0 K/UL    ABS. LYMPHOCYTES 1.9 0.9 - 3.6 K/UL    ABS. MONOCYTES 0.7 0.05 - 1.2 K/UL    ABS. EOSINOPHILS 0.0 0.0 - 0.4 K/UL    ABS.  BASOPHILS 0.0 0.0 - 0.06 K/UL    DF AUTOMATED     METABOLIC PANEL, COMPREHENSIVE    Collection Time: 02/19/18  4:20 PM   Result Value Ref Range    Sodium 138 136 - 145 mmol/L    Potassium 3.2 (L) 3.5 - 5.5 mmol/L    Chloride 99 (L) 100 - 108 mmol/L    CO2 28 21 - 32 mmol/L    Anion gap 11 3.0 - 18 mmol/L    Glucose 124 (H) 74 - 99 mg/dL    BUN 8 7.0 - 18 MG/DL    Creatinine 1.03 0.6 - 1.3 MG/DL BUN/Creatinine ratio 8 (L) 12 - 20      GFR est AA >60 >60 ml/min/1.73m2    GFR est non-AA 56 (L) >60 ml/min/1.73m2    Calcium 9.5 8.5 - 10.1 MG/DL    Bilirubin, total 0.5 0.2 - 1.0 MG/DL    ALT (SGPT) 61 (H) 13 - 56 U/L    AST (SGOT) 43 (H) 15 - 37 U/L    Alk. phosphatase 158 (H) 45 - 117 U/L    Protein, total 9.1 (H) 6.4 - 8.2 g/dL    Albumin 3.8 3.4 - 5.0 g/dL    Globulin 5.3 (H) 2.0 - 4.0 g/dL    A-G Ratio 0.7 (L) 0.8 - 1.7     LIPASE    Collection Time: 02/19/18  4:20 PM   Result Value Ref Range    Lipase 106 73 - 393 U/L   URINE MICROSCOPIC ONLY    Collection Time: 02/19/18  4:20 PM   Result Value Ref Range    WBC 11 to 20 0 - 4 /hpf    RBC NONE 0 - 5 /hpf    Epithelial cells 2+ 0 - 5 /lpf    Bacteria 4+ (A) NEG /hpf       Radiologic Studies -   No orders to display         Medical Decision Making   I am the first provider for this patient. I reviewed the vital signs, available nursing notes, past medical history, past surgical history, family history and social history. Vital Signs-Reviewed the patient's vital signs. Pulse Oximetry Analysis -  96% on room air (Interpretation) Normal    Cardiac Monitor:  Rate: 102 bpm  Rhythm:  Sinus Tachycardia       Records Reviewed: Nursing Notes (Time of Review: 4:11 PM)        Diagnosis     Clinical Impression:   1. Non-intractable cyclical vomiting with nausea        Disposition: Discharge    Follow-up Information     Follow up With Details Comments Giorgio Stanley DO Call in 1 week For follow up [address] [facility]  [address] [facility] [address] EMERGENCY DEPT Go to As needed, If symptoms worsen [address] [facility]  [phone]           Patient's Medications   Start Taking    No medications on file   Continue Taking    ACETAMINOPHEN (TYLENOL EXTRA STRENGTH) 500 MG TABLET    Take  by mouth every six (6) hours as needed for Pain.     ALBUTEROL (PROVENTIL, VENTOLIN) 90 MCG/ACTUATION INHALER    Take 1-2 Puffs by inhalation every four (4) hours as needed for Wheezing. CYANOCOBALAMIN, VITAMIN B-12, (VITAMIN B-12 PO)    Take  by mouth. DIPHENHYDRAMINE (BENADRYL) 25 MG CAPSULE    Take 25 mg by mouth every six (6) hours as needed. ERGOCALCIFEROL (VITAMIN D2) 50,000 UNIT CAPSULE    Take 1 Cap by mouth every seven (7) days. FAMOTIDINE (PEPCID) 20 MG TABLET    Take 1 Tab by mouth two (2) times a day for 5 days. FLUTICASONE-SALMETEROL (ADVAIR DISKUS) 250-50 MCG/DOSE DISKUS INHALER    Take 1 Puff by inhalation every twelve (12) hours. ONDANSETRON (ZOFRAN ODT) 4 MG DISINTEGRATING TABLET    Take 1 Tab by mouth every eight (8) hours as needed for Nausea. PREDNISONE (DELTASONE) 20 MG TABLET    Take 1 Tab by mouth daily for 3 days. With Breakfast    PREDNISONE (DELTASONE) 50 MG TABLET    Take 1 Tab by mouth daily for 3 days. PROMETHAZINE (PHENERGAN) 25 MG TABLET    Take 1 Tab by mouth every six (6) hours as needed for Nausea. Caution: This medication may make you drowsy. Avoid driving while under the influence of this medication. TRAMADOL (ULTRAM) 50 MG TABLET    Take 1 Tab by mouth every six (6) hours as needed for Pain. Max Daily Amount: 200 mg. TRIAMCINOLONE ACETONIDE (KENALOG) 0.1 % OINTMENT    Apply  to affected area two (2) times a day. use thin layer   These Medications have changed    No medications on file   Stop Taking    No medications on file     _______________________________    Attestations:  [id number] acting as a scribe for and in the presence of Mookie Carpenter MD      February 19, 2018 at 4:11 PM       Provider Attestation:      I personally performed the services described in the documentation, reviewed the documentation, as recorded by the scribe in my presence, and it accurately and completely records my words and actions.  February 19, 2018 at 4:11 PM - Mookie Carpenter MD    _______________________________  Pt feeling \"much better\" after meds and IVF, wants to go home, agrees with dispo and F/U plan.   Collin Green MD  5:42 PM

## 2018-02-19 NOTE — ED NOTES
Pt reports some improvement of nausea. Reports abdominal pain & headache, 9/10. IV NS bolus continues to infuse via gravity. VS as noted.

## 2018-03-20 ENCOUNTER — APPOINTMENT (OUTPATIENT)
Dept: CT IMAGING | Age: 53
DRG: 419 | End: 2018-03-20
Attending: EMERGENCY MEDICINE
Payer: OTHER GOVERNMENT

## 2018-03-20 ENCOUNTER — APPOINTMENT (OUTPATIENT)
Dept: GENERAL RADIOLOGY | Age: 53
DRG: 419 | End: 2018-03-20
Attending: EMERGENCY MEDICINE
Payer: OTHER GOVERNMENT

## 2018-03-20 ENCOUNTER — HOSPITAL ENCOUNTER (INPATIENT)
Age: 53
LOS: 4 days | Discharge: HOME | DRG: 419 | End: 2018-03-24
Attending: EMERGENCY MEDICINE | Admitting: INTERNAL MEDICINE
Payer: OTHER GOVERNMENT

## 2018-03-20 ENCOUNTER — APPOINTMENT (OUTPATIENT)
Dept: ULTRASOUND IMAGING | Age: 53
DRG: 419 | End: 2018-03-20
Attending: EMERGENCY MEDICINE
Payer: OTHER GOVERNMENT

## 2018-03-20 DIAGNOSIS — K85.10 GALLSTONE PANCREATITIS: Primary | ICD-10-CM

## 2018-03-20 DIAGNOSIS — Z90.49 S/P LAPAROSCOPIC CHOLECYSTECTOMY: ICD-10-CM

## 2018-03-20 DIAGNOSIS — R10.13 ABDOMINAL PAIN, EPIGASTRIC: ICD-10-CM

## 2018-03-20 DIAGNOSIS — R11.2 NON-INTRACTABLE VOMITING WITH NAUSEA, UNSPECIFIED VOMITING TYPE: ICD-10-CM

## 2018-03-20 PROBLEM — E86.0 DEHYDRATION: Status: ACTIVE | Noted: 2018-03-20

## 2018-03-20 LAB
ALBUMIN SERPL-MCNC: 3.9 G/DL (ref 3.4–5)
ALBUMIN/GLOB SERPL: 0.8 {RATIO} (ref 0.8–1.7)
ALP SERPL-CCNC: 189 U/L (ref 45–117)
ALT SERPL-CCNC: 99 U/L (ref 13–56)
ANION GAP SERPL CALC-SCNC: 11 MMOL/L (ref 3–18)
APPEARANCE UR: CLEAR
AST SERPL-CCNC: 246 U/L (ref 15–37)
ATRIAL RATE: 100 BPM
BACTERIA URNS QL MICRO: ABNORMAL /HPF
BASOPHILS # BLD: 0 K/UL (ref 0–0.06)
BASOPHILS NFR BLD: 0 % (ref 0–2)
BILIRUB SERPL-MCNC: 0.8 MG/DL (ref 0.2–1)
BILIRUB UR QL: NEGATIVE
BUN SERPL-MCNC: 11 MG/DL (ref 7–18)
BUN/CREAT SERPL: 13 (ref 12–20)
CALCIUM SERPL-MCNC: 9.5 MG/DL (ref 8.5–10.1)
CALCULATED P AXIS, ECG09: 59 DEGREES
CALCULATED R AXIS, ECG10: -4 DEGREES
CALCULATED T AXIS, ECG11: 38 DEGREES
CHLORIDE SERPL-SCNC: 105 MMOL/L (ref 100–108)
CO2 SERPL-SCNC: 24 MMOL/L (ref 21–32)
COLOR UR: YELLOW
CREAT SERPL-MCNC: 0.86 MG/DL (ref 0.6–1.3)
DIAGNOSIS, 93000: NORMAL
DIFFERENTIAL METHOD BLD: ABNORMAL
EOSINOPHIL # BLD: 0 K/UL (ref 0–0.4)
EOSINOPHIL NFR BLD: 1 % (ref 0–5)
EPITH CASTS URNS QL MICRO: ABNORMAL /LPF (ref 0–5)
ERYTHROCYTE [DISTWIDTH] IN BLOOD BY AUTOMATED COUNT: 14.1 % (ref 11.6–14.5)
GLOBULIN SER CALC-MCNC: 4.8 G/DL (ref 2–4)
GLUCOSE SERPL-MCNC: 182 MG/DL (ref 74–99)
GLUCOSE UR STRIP.AUTO-MCNC: NEGATIVE MG/DL
HBA1C MFR BLD: 5.7 % (ref 4.2–5.6)
HCT VFR BLD AUTO: 43 % (ref 35–45)
HGB BLD-MCNC: 14.3 G/DL (ref 12–16)
HGB UR QL STRIP: NEGATIVE
KETONES UR QL STRIP.AUTO: NEGATIVE MG/DL
LEUKOCYTE ESTERASE UR QL STRIP.AUTO: ABNORMAL
LIPASE SERPL-CCNC: ABNORMAL U/L (ref 73–393)
LYMPHOCYTES # BLD: 1.6 K/UL (ref 0.9–3.6)
LYMPHOCYTES NFR BLD: 18 % (ref 21–52)
MAGNESIUM SERPL-MCNC: 1.7 MG/DL (ref 1.6–2.6)
MCH RBC QN AUTO: 26.4 PG (ref 24–34)
MCHC RBC AUTO-ENTMCNC: 33.3 G/DL (ref 31–37)
MCV RBC AUTO: 79.3 FL (ref 74–97)
MONOCYTES # BLD: 0.6 K/UL (ref 0.05–1.2)
MONOCYTES NFR BLD: 7 % (ref 3–10)
NEUTS SEG # BLD: 6.5 K/UL (ref 1.8–8)
NEUTS SEG NFR BLD: 74 % (ref 40–73)
NITRITE UR QL STRIP.AUTO: NEGATIVE
P-R INTERVAL, ECG05: 146 MS
PH UR STRIP: 6 [PH] (ref 5–8)
PLATELET # BLD AUTO: 271 K/UL (ref 135–420)
PMV BLD AUTO: 10.6 FL (ref 9.2–11.8)
POTASSIUM SERPL-SCNC: 4 MMOL/L (ref 3.5–5.5)
PROT SERPL-MCNC: 8.7 G/DL (ref 6.4–8.2)
PROT UR STRIP-MCNC: NEGATIVE MG/DL
Q-T INTERVAL, ECG07: 378 MS
QRS DURATION, ECG06: 78 MS
QTC CALCULATION (BEZET), ECG08: 487 MS
RBC # BLD AUTO: 5.42 M/UL (ref 4.2–5.3)
RBC #/AREA URNS HPF: ABNORMAL /HPF (ref 0–5)
SODIUM SERPL-SCNC: 140 MMOL/L (ref 136–145)
SP GR UR REFRACTOMETRY: 1.01 (ref 1–1.03)
TROPONIN I SERPL-MCNC: <0.02 NG/ML (ref 0–0.06)
UROBILINOGEN UR QL STRIP.AUTO: 1 EU/DL (ref 0.2–1)
VENTRICULAR RATE, ECG03: 100 BPM
WBC # BLD AUTO: 8.8 K/UL (ref 4.6–13.2)
WBC URNS QL MICRO: ABNORMAL /HPF (ref 0–4)

## 2018-03-20 PROCEDURE — 83690 ASSAY OF LIPASE: CPT | Performed by: EMERGENCY MEDICINE

## 2018-03-20 PROCEDURE — 71045 X-RAY EXAM CHEST 1 VIEW: CPT

## 2018-03-20 PROCEDURE — 96374 THER/PROPH/DIAG INJ IV PUSH: CPT

## 2018-03-20 PROCEDURE — 83036 HEMOGLOBIN GLYCOSYLATED A1C: CPT | Performed by: EMERGENCY MEDICINE

## 2018-03-20 PROCEDURE — 65270000029 HC RM PRIVATE

## 2018-03-20 PROCEDURE — 99284 EMERGENCY DEPT VISIT MOD MDM: CPT

## 2018-03-20 PROCEDURE — 85025 COMPLETE CBC W/AUTO DIFF WBC: CPT | Performed by: EMERGENCY MEDICINE

## 2018-03-20 PROCEDURE — 83735 ASSAY OF MAGNESIUM: CPT | Performed by: EMERGENCY MEDICINE

## 2018-03-20 PROCEDURE — 76705 ECHO EXAM OF ABDOMEN: CPT

## 2018-03-20 PROCEDURE — 96375 TX/PRO/DX INJ NEW DRUG ADDON: CPT

## 2018-03-20 PROCEDURE — 74177 CT ABD & PELVIS W/CONTRAST: CPT

## 2018-03-20 PROCEDURE — 74011000250 HC RX REV CODE- 250: Performed by: EMERGENCY MEDICINE

## 2018-03-20 PROCEDURE — 80053 COMPREHEN METABOLIC PANEL: CPT | Performed by: EMERGENCY MEDICINE

## 2018-03-20 PROCEDURE — 84484 ASSAY OF TROPONIN QUANT: CPT | Performed by: EMERGENCY MEDICINE

## 2018-03-20 PROCEDURE — 74011636320 HC RX REV CODE- 636/320: Performed by: EMERGENCY MEDICINE

## 2018-03-20 PROCEDURE — 81001 URINALYSIS AUTO W/SCOPE: CPT | Performed by: EMERGENCY MEDICINE

## 2018-03-20 PROCEDURE — 74011250636 HC RX REV CODE- 250/636: Performed by: EMERGENCY MEDICINE

## 2018-03-20 PROCEDURE — 96361 HYDRATE IV INFUSION ADD-ON: CPT

## 2018-03-20 PROCEDURE — 74011250636 HC RX REV CODE- 250/636

## 2018-03-20 PROCEDURE — 93005 ELECTROCARDIOGRAM TRACING: CPT

## 2018-03-20 RX ORDER — ONDANSETRON 2 MG/ML
4 INJECTION INTRAMUSCULAR; INTRAVENOUS
Status: DISCONTINUED | OUTPATIENT
Start: 2018-03-20 | End: 2018-03-24 | Stop reason: HOSPADM

## 2018-03-20 RX ORDER — MORPHINE SULFATE 4 MG/ML
4 INJECTION, SOLUTION INTRAMUSCULAR; INTRAVENOUS
Status: DISCONTINUED | OUTPATIENT
Start: 2018-03-20 | End: 2018-03-21

## 2018-03-20 RX ORDER — CIPROFLOXACIN 2 MG/ML
400 INJECTION, SOLUTION INTRAVENOUS
Status: COMPLETED | OUTPATIENT
Start: 2018-03-20 | End: 2018-03-20

## 2018-03-20 RX ORDER — MORPHINE SULFATE 4 MG/ML
INJECTION INTRAVENOUS
Status: DISPENSED
Start: 2018-03-20 | End: 2018-03-21

## 2018-03-20 RX ORDER — ONDANSETRON 2 MG/ML
4 INJECTION INTRAMUSCULAR; INTRAVENOUS
Status: DISCONTINUED | OUTPATIENT
Start: 2018-03-20 | End: 2018-03-21 | Stop reason: SDUPTHER

## 2018-03-20 RX ORDER — PROCHLORPERAZINE EDISYLATE 5 MG/ML
10 INJECTION INTRAMUSCULAR; INTRAVENOUS
Status: DISCONTINUED | OUTPATIENT
Start: 2018-03-20 | End: 2018-03-24 | Stop reason: HOSPADM

## 2018-03-20 RX ORDER — MORPHINE SULFATE 4 MG/ML
4 INJECTION INTRAVENOUS
Status: COMPLETED | OUTPATIENT
Start: 2018-03-20 | End: 2018-03-20

## 2018-03-20 RX ORDER — SODIUM CHLORIDE 9 MG/ML
150 INJECTION, SOLUTION INTRAVENOUS CONTINUOUS
Status: DISCONTINUED | OUTPATIENT
Start: 2018-03-21 | End: 2018-03-24

## 2018-03-20 RX ORDER — HEPARIN SODIUM 5000 [USP'U]/ML
5000 INJECTION, SOLUTION INTRAVENOUS; SUBCUTANEOUS EVERY 8 HOURS
Status: DISCONTINUED | OUTPATIENT
Start: 2018-03-21 | End: 2018-03-22

## 2018-03-20 RX ORDER — NALOXONE HYDROCHLORIDE 0.4 MG/ML
0.4 INJECTION, SOLUTION INTRAMUSCULAR; INTRAVENOUS; SUBCUTANEOUS AS NEEDED
Status: DISCONTINUED | OUTPATIENT
Start: 2018-03-20 | End: 2018-03-24 | Stop reason: HOSPADM

## 2018-03-20 RX ORDER — METRONIDAZOLE 500 MG/100ML
500 INJECTION, SOLUTION INTRAVENOUS
Status: COMPLETED | OUTPATIENT
Start: 2018-03-20 | End: 2018-03-20

## 2018-03-20 RX ORDER — MORPHINE SULFATE 10 MG/ML
6 INJECTION, SOLUTION INTRAMUSCULAR; INTRAVENOUS
Status: COMPLETED | OUTPATIENT
Start: 2018-03-20 | End: 2018-03-20

## 2018-03-20 RX ORDER — ONDANSETRON 2 MG/ML
4 INJECTION INTRAMUSCULAR; INTRAVENOUS
Status: DISCONTINUED | OUTPATIENT
Start: 2018-03-20 | End: 2018-03-20

## 2018-03-20 RX ORDER — CIPROFLOXACIN 2 MG/ML
400 INJECTION, SOLUTION INTRAVENOUS EVERY 12 HOURS
Status: DISCONTINUED | OUTPATIENT
Start: 2018-03-21 | End: 2018-03-24

## 2018-03-20 RX ORDER — ONDANSETRON 2 MG/ML
4 INJECTION INTRAMUSCULAR; INTRAVENOUS
Status: COMPLETED | OUTPATIENT
Start: 2018-03-20 | End: 2018-03-20

## 2018-03-20 RX ORDER — ALBUTEROL SULFATE 0.83 MG/ML
2.5 SOLUTION RESPIRATORY (INHALATION)
Status: DISCONTINUED | OUTPATIENT
Start: 2018-03-21 | End: 2018-03-24 | Stop reason: HOSPADM

## 2018-03-20 RX ADMIN — SODIUM CHLORIDE 1000 ML: 900 INJECTION, SOLUTION INTRAVENOUS at 12:52

## 2018-03-20 RX ADMIN — PROCHLORPERAZINE EDISYLATE 10 MG: 5 INJECTION INTRAMUSCULAR; INTRAVENOUS at 14:26

## 2018-03-20 RX ADMIN — MORPHINE SULFATE 4 MG: 4 INJECTION INTRAVENOUS at 12:52

## 2018-03-20 RX ADMIN — CIPROFLOXACIN 400 MG: 2 INJECTION, SOLUTION INTRAVENOUS at 17:59

## 2018-03-20 RX ADMIN — DIATRIZOATE MEGLUMINE AND DIATRIZOATE SODIUM 30 ML: 600; 100 SOLUTION ORAL; RECTAL at 12:53

## 2018-03-20 RX ADMIN — IOPAMIDOL 100 ML: 612 INJECTION, SOLUTION INTRAVENOUS at 14:45

## 2018-03-20 RX ADMIN — MORPHINE SULFATE 6 MG: 10 INJECTION INTRAMUSCULAR; INTRAVENOUS; SUBCUTANEOUS at 14:47

## 2018-03-20 RX ADMIN — ONDANSETRON 4 MG: 2 INJECTION INTRAMUSCULAR; INTRAVENOUS at 12:53

## 2018-03-20 RX ADMIN — PROCHLORPERAZINE EDISYLATE 10 MG: 5 INJECTION INTRAMUSCULAR; INTRAVENOUS at 20:42

## 2018-03-20 RX ADMIN — METRONIDAZOLE 500 MG: 500 INJECTION, SOLUTION INTRAVENOUS at 16:42

## 2018-03-20 RX ADMIN — SODIUM CHLORIDE 1000 ML: 900 INJECTION, SOLUTION INTRAVENOUS at 14:20

## 2018-03-20 NOTE — ED NOTES
Patient unable to tolerate oral contrast. Dr. Dann Velez aware and order changed to go without PO contrast. Bethanie Escudero, CT tech, called and made aware.

## 2018-03-20 NOTE — ED TRIAGE NOTES
Pt took Potassium pill last PM on an empty stomach and has had vomiting ever since. Currently vomiting in triage, unable to sit still.

## 2018-03-20 NOTE — ED PROVIDER NOTES
EMERGENCY DEPARTMENT HISTORY AND PHYSICAL EXAM    12:41 PM      Date: 3/20/2018  Patient Name: Nacho Clark    History of Presenting Illness     Chief Complaint   Patient presents with    Vomiting    Abdominal Pain         History Provided By: Patient    Chief Complaint: abdominal pain  Duration:  Began last night  Timing:  Acute and Constant  Location: diffuse  Quality:  Severity: Severe  Modifying Factors:   Associated Symptoms: vomiting, SOB, diarrhea, passing gas, chills      Additional History (Context): Nacho Clark is a 47 y.o. female with history of hypertension, anemia, asthma, appendectomy, gastric and intestinal surgery in 2006 d/t \"acid reflux\" who presents with generalized abdominal pain with associated vomiting, diarrhea, passing gas that began last night after taking her potassium without food. She also notes some SOB. She denies any dysuria, chest pain and recent illness exposure, abnormal food intake and antibiotics. She denies any alcohol use. No other symptoms or concerns were expressed. PCP: Naomi Werner DO    Current Facility-Administered Medications   Medication Dose Route Frequency Provider Last Rate Last Dose    prochlorperazine (COMPAZINE) injection 10 mg  10 mg IntraVENous Q6H PRN Renetta Davis DO   10 mg at 03/20/18 1426     Current Outpatient Prescriptions   Medication Sig Dispense Refill    ondansetron (ZOFRAN ODT) 8 mg disintegrating tablet Take 1 Tab by mouth every eight (8) hours as needed for Nausea. 10 Tab 0    traMADol (ULTRAM) 50 mg tablet Take 1 Tab by mouth every six (6) hours as needed for Pain. Max Daily Amount: 200 mg. 10 Tab 0    promethazine (PHENERGAN) 25 mg tablet Take 1 Tab by mouth every six (6) hours as needed for Nausea. Caution: This medication may make you drowsy. Avoid driving while under the influence of this medication.  12 Tab 0    acetaminophen (TYLENOL EXTRA STRENGTH) 500 mg tablet Take  by mouth every six (6) hours as needed for Pain.  diphenhydrAMINE (BENADRYL) 25 mg capsule Take 25 mg by mouth every six (6) hours as needed.  fluticasone-salmeterol (ADVAIR DISKUS) 250-50 mcg/dose diskus inhaler Take 1 Puff by inhalation every twelve (12) hours.  triamcinolone acetonide (KENALOG) 0.1 % ointment Apply  to affected area two (2) times a day. use thin layer 30 g 3    ergocalciferol (VITAMIN D2) 50,000 unit capsule Take 1 Cap by mouth every seven (7) days. 12 Cap 1    CYANOCOBALAMIN, VITAMIN B-12, (VITAMIN B-12 PO) Take  by mouth.  albuterol (PROVENTIL, VENTOLIN) 90 mcg/actuation inhaler Take 1-2 Puffs by inhalation every four (4) hours as needed for Wheezing. 1 g 3       Past History     Past Medical History:  Past Medical History:   Diagnosis Date    Anemia     Asthma     BMI 34.0-34.9,adult 5/11/2017    Bowel obstruction     Gall stones     GERD (gastroesophageal reflux disease)     History of blood transfusion     Hypertension     Sickle cell trait ([facility] [address]        Past Surgical History:  Past Surgical History:   Procedure Laterality Date    ABDOMEN SURGERY PROC UNLISTED  2006    surgery for bowel obstruction    HX APPENDECTOMY      HX TUBAL LIGATION         Family History:  Family History   Problem Relation Age of Onset    Cancer Mother     Cancer Brother        Social History:  Social History   Substance Use Topics    Smoking status: Never Smoker    Smokeless tobacco: Never Used    Alcohol use No       Allergies: Allergies   Allergen Reactions    Iron Anaphylaxis    Other Medication Nausea and Vomiting     All \"mycins\"    Penicillins Rash    Sulfa (Sulfonamide Antibiotics) Hives    Venofer [Iron Sucrose] Rash and Itching         Review of Systems       Review of Systems   Constitutional: Positive for chills. Negative for fever. Respiratory: Positive for shortness of breath. Cardiovascular: Negative for chest pain.    Gastrointestinal: Positive for abdominal pain, diarrhea, nausea and vomiting. All other systems reviewed and are negative. Physical Exam     Visit Vitals    /76 (BP 1 Location: Right arm, BP Patient Position: At rest)    Pulse (!) 110    Temp 98.1 °F (36.7 °C)    Resp 17    Wt 79.4 kg (175 lb)    SpO2 100%    BMI 30.04 kg/m2         Physical Exam   Constitutional: She appears well-developed and well-nourished. Appears uncomfortable   HENT:   Head: Normocephalic and atraumatic. Eyes: Pupils are equal, round, and reactive to light. Right eye exhibits no discharge. Left eye exhibits no discharge. Neck: Normal range of motion. Neck supple. Cardiovascular: Normal rate and normal heart sounds. Pulmonary/Chest: Effort normal and breath sounds normal. No respiratory distress. Abdominal: Soft. There is no rebound. TTP diffusely, most significant in epigastric region, obese   Musculoskeletal: Normal range of motion. She exhibits no edema. Neurological: She is alert. She exhibits normal muscle tone. Skin: Skin is warm and dry. She is not diaphoretic. Psychiatric: She has a normal mood and affect. Her behavior is normal.         Diagnostic Study Results     Labs -  Recent Results (from the past 12 hour(s))   EKG, 12 LEAD, INITIAL    Collection Time: 03/20/18 12:54 PM   Result Value Ref Range    Ventricular Rate 100 BPM    Atrial Rate 100 BPM    P-R Interval 146 ms    QRS Duration 78 ms    Q-T Interval 378 ms    QTC Calculation (Bezet) 487 ms    Calculated P Axis 59 degrees    Calculated R Axis -4 degrees    Calculated T Axis 38 degrees    Diagnosis       Sinus rhythm with fusion complexes  Prolonged QT  Abnormal ECG  When compared with ECG of 19-OCT-2017 12:33,  fusion complexes are now present  Vent.  rate has increased BY  36 BPM     CBC WITH AUTOMATED DIFF    Collection Time: 03/20/18  1:00 PM   Result Value Ref Range    WBC 8.8 4.6 - 13.2 K/uL    RBC 5.42 (H) 4.20 - 5.30 M/uL    HGB 14.3 12.0 - 16.0 g/dL    HCT 43.0 35.0 - 45.0 %    MCV 79.3 74.0 - 97.0 FL    MCH 26.4 24.0 - 34.0 PG    MCHC 33.3 31.0 - 37.0 g/dL    RDW 14.1 11.6 - 14.5 %    PLATELET 830 250 - 344 K/uL    MPV 10.6 9.2 - 11.8 FL    NEUTROPHILS 74 (H) 40 - 73 %    LYMPHOCYTES 18 (L) 21 - 52 %    MONOCYTES 7 3 - 10 %    EOSINOPHILS 1 0 - 5 %    BASOPHILS 0 0 - 2 %    ABS. NEUTROPHILS 6.5 1.8 - 8.0 K/UL    ABS. LYMPHOCYTES 1.6 0.9 - 3.6 K/UL    ABS. MONOCYTES 0.6 0.05 - 1.2 K/UL    ABS. EOSINOPHILS 0.0 0.0 - 0.4 K/UL    ABS. BASOPHILS 0.0 0.0 - 0.06 K/UL    DF AUTOMATED     METABOLIC PANEL, COMPREHENSIVE    Collection Time: 03/20/18  1:00 PM   Result Value Ref Range    Sodium 140 136 - 145 mmol/L    Potassium 4.0 3.5 - 5.5 mmol/L    Chloride 105 100 - 108 mmol/L    CO2 24 21 - 32 mmol/L    Anion gap 11 3.0 - 18 mmol/L    Glucose 182 (H) 74 - 99 mg/dL    BUN 11 7.0 - 18 MG/DL    Creatinine 0.86 0.6 - 1.3 MG/DL    BUN/Creatinine ratio 13 12 - 20      GFR est AA >60 >60 ml/min/1.73m2    GFR est non-AA >60 >60 ml/min/1.73m2    Calcium 9.5 8.5 - 10.1 MG/DL    Bilirubin, total 0.8 0.2 - 1.0 MG/DL    ALT (SGPT) 99 (H) 13 - 56 U/L    AST (SGOT) 246 (H) 15 - 37 U/L    Alk.  phosphatase 189 (H) 45 - 117 U/L    Protein, total 8.7 (H) 6.4 - 8.2 g/dL    Albumin 3.9 3.4 - 5.0 g/dL    Globulin 4.8 (H) 2.0 - 4.0 g/dL    A-G Ratio 0.8 0.8 - 1.7     LIPASE    Collection Time: 03/20/18  1:00 PM   Result Value Ref Range    Lipase 09299 (H) 73 - 393 U/L   TROPONIN I    Collection Time: 03/20/18  1:00 PM   Result Value Ref Range    Troponin-I, Qt. <0.02 0.00 - 0.06 NG/ML   URINALYSIS W/ RFLX MICROSCOPIC    Collection Time: 03/20/18  2:00 PM   Result Value Ref Range    Color YELLOW      Appearance CLEAR      Specific gravity 1.013 1.005 - 1.030      pH (UA) 6.0 5.0 - 8.0      Protein NEGATIVE  NEG mg/dL    Glucose NEGATIVE  NEG mg/dL    Ketone NEGATIVE  NEG mg/dL    Bilirubin NEGATIVE  NEG      Blood NEGATIVE  NEG      Urobilinogen 1.0 0.2 - 1.0 EU/dL    Nitrites NEGATIVE  NEG      Leukocyte Esterase TRACE (A) NEG URINE MICROSCOPIC ONLY    Collection Time: 03/20/18  2:00 PM   Result Value Ref Range    WBC 0 to 3 0 - 4 /hpf    RBC NONE 0 - 5 /hpf    Epithelial cells 1+ 0 - 5 /lpf    Bacteria FEW (A) NEG /hpf       Radiologic Studies -   CT ABD PELV W CONT   1. Acute pancreatitis. 2. Chronic and stable mild dilation of the CBD for many years. Choledocholithiasis cannot be excluded by CT. 3. Cholelithiasis and nonspecific gallbladder wall thickening. This could be  secondary to pancreatic process or additional cholecystitis. 4. Fluid containing distal esophagus suggesting gastroesophageal reflux disease  or dysmotility. 5. Other incidental stable CT findings as above. As interpreted by radiology. XR CHEST PORT   Negative chest.   As interpreted by radiology. US ABD LTD     Thickened gallbladder wall with possible fluid in the wall vs. adjacent to the  gallbladder in the setting of gallstones. Concerning for acute cholecystitis. Dilated common bile duct. Possible sludge or stones within the common duct as  Well. As interpreted by radiology. Medical Decision Making   I am the first provider for this patient. I reviewed the vital signs, available nursing notes, past medical history, past surgical history, family history and social history. Vital Signs-Reviewed the patient's vital signs. Pulse Oximetry Analysis -  100% on room air (Interpretation)    EKG: Interpreted by the EP. SR at 100bpm, nml axis, qt prolongation at 487ms, no stemi, no acute ischemic changes, no old ekg for comparison    Records Reviewed: Nursing Notes and Old Medical Records (Time of Review: 12:41 PM)    ED Course: Progress Notes, Reevaluation, and Consults:    4:15 PM Spoke to Dr Nancie Asif, General Surgery, about the patient's case. Recommends that hospitalist be contacted for admission, but agrees to consult. Would like pt to have a hemoglobin A1C added to labs.      4:27 PM Spoke with Dr. Sammy Lemus, Hospitalist, about the patient's complaint, extremely elevated lipase and imaging results. 4:39 PM Spoke with Dr. John Samayoa, GI, about the patient's complaint, lab results, imaging results and plan for admission. Agrees to consult and recommends MRCP overnight. 4:50 PM Spoke with Dr. Derrell Eli, and communicated Dr. Zhou Hint request for a MRCP overnight. Provider Notes (Medical Decision Making):   Pt with gallstone pancreatitis. CBD dilated, question of acuity of this finding. Pt denies significant EtOH. May require GI evaluation, possible ERCP. For Hospitalized Patients:    1. Hospitalization Decision Time:  The decision to hospitalize the patient was made by Dr. Aguilar Carr at 3:59 PM on 3/20/2018      Diagnosis     Clinical Impression:   1. Gallstone pancreatitis    2. Abdominal pain, epigastric    3. Non-intractable vomiting with nausea, unspecified vomiting type        Disposition: Admit    Follow-up Information     None           Patient's Medications   Start Taking    No medications on file   Continue Taking    ACETAMINOPHEN (TYLENOL EXTRA STRENGTH) 500 MG TABLET    Take  by mouth every six (6) hours as needed for Pain. ALBUTEROL (PROVENTIL, VENTOLIN) 90 MCG/ACTUATION INHALER    Take 1-2 Puffs by inhalation every four (4) hours as needed for Wheezing. CYANOCOBALAMIN, VITAMIN B-12, (VITAMIN B-12 PO)    Take  by mouth. DIPHENHYDRAMINE (BENADRYL) 25 MG CAPSULE    Take 25 mg by mouth every six (6) hours as needed. ERGOCALCIFEROL (VITAMIN D2) 50,000 UNIT CAPSULE    Take 1 Cap by mouth every seven (7) days. FLUTICASONE-SALMETEROL (ADVAIR DISKUS) 250-50 MCG/DOSE DISKUS INHALER    Take 1 Puff by inhalation every twelve (12) hours. ONDANSETRON (ZOFRAN ODT) 8 MG DISINTEGRATING TABLET    Take 1 Tab by mouth every eight (8) hours as needed for Nausea. PROMETHAZINE (PHENERGAN) 25 MG TABLET    Take 1 Tab by mouth every six (6) hours as needed for Nausea. Caution:   This medication may make you drowsy. Avoid driving while under the influence of this medication. TRAMADOL (ULTRAM) 50 MG TABLET    Take 1 Tab by mouth every six (6) hours as needed for Pain. Max Daily Amount: 200 mg. TRIAMCINOLONE ACETONIDE (KENALOG) 0.1 % OINTMENT    Apply  to affected area two (2) times a day. use thin layer   These Medications have changed    No medications on file   Stop Taking    No medications on file     _______________________________    Attestations:  Raciel Yun acting as a scribe for and in the presence of Carolina Solomon DO      March 20, 2018 at 12:41 PM       Provider Attestation:      I personally performed the services described in the documentation, reviewed the documentation, as recorded by the scribe in my presence, and it accurately and completely records my words and actions.  March 20, 2018 at 12:41 PM - [facility], DO    _______________________________

## 2018-03-20 NOTE — IP AVS SNAPSHOT
[address] [address] [place] Patient: Phyllis Rodriguez MRN: [id number] :1965 A check reyes indicates which time of day the medication should be taken. My Medications START taking these medications Instructions Each Dose to Equal  
 Morning Noon Evening Bedtime  
 amLODIPine 2.5 mg tablet Commonly known as:  Ramiro Free Your last dose was: Your next dose is: Take 1 Tab by mouth daily. Indications: do not bharati it if your upper blood pressure is less than 120  
 2.5 mg  
    
   
   
   
  
 metoprolol tartrate 25 mg tablet Commonly known as:  LOPRESSOR Your last dose was: Your next dose is: Take 1 Tab by mouth every twelve (12) hours. Indications: do not take it if your upper blood pressure is less than 110  
 25 mg  
    
   
   
   
  
 oxyCODONE-acetaminophen 7.5-325 mg per tablet Commonly known as:  PERCOCET 7.5 Your last dose was: Your next dose is: Take 1 Tab by mouth every four (4) hours as needed. Max Daily Amount: 6 Tabs. Indications: Pain 1 Tab  
    
   
   
   
  
 pantoprazole 40 mg tablet Commonly known as:  PROTONIX Start taking on:  3/25/2018 Your last dose was: Your next dose is: Take 1 Tab by mouth Daily (before breakfast). 40 mg CONTINUE taking these medications Instructions Each Dose to Equal  
 Morning Noon Evening Bedtime ADVAIR DISKUS 250-50 mcg/dose diskus inhaler Generic drug:  fluticasone-salmeterol Your last dose was: Your next dose is: Take 1 Puff by inhalation every twelve (12) hours. 1 Puff  
    
   
   
   
  
 albuterol 90 mcg/actuation inhaler Commonly known as:  Lyly Nj Your last dose was: Your next dose is: Take 1-2 Puffs by inhalation every four (4) hours as needed for Wheezing. 1-2 Puff  
    
   
   
   
  
 ondansetron 8 mg disintegrating tablet Commonly known as:  ZOFRAN ODT Your last dose was: Your next dose is: Take 1 Tab by mouth every eight (8) hours as needed for Nausea. 8 mg TYLENOL EXTRA STRENGTH 500 mg tablet Generic drug:  acetaminophen Your last dose was: Your next dose is: Take  by mouth every six (6) hours as needed for Pain. VITAMIN B-12 PO Your last dose was: Your next dose is: Take  by mouth. STOP taking these medications BENADRYL 25 mg capsule Generic drug:  diphenhydrAMINE  
   
  
 ergocalciferol 50,000 unit capsule Commonly known as:  VITAMIN D2  
   
  
 promethazine 25 mg tablet Commonly known as:  PHENERGAN  
   
  
 traMADol 50 mg tablet Commonly known as:  ULTRAM  
   
  
 triamcinolone acetonide 0.1 % ointment Commonly known as:  KENALOG Where to Get Your Medications Information on where to get these meds will be given to you by the nurse or doctor. ! Ask your nurse or doctor about these medications  
  amLODIPine 2.5 mg tablet  
 metoprolol tartrate 25 mg tablet  
 ondansetron 8 mg disintegrating tablet  
 oxyCODONE-acetaminophen 7.5-325 mg per tablet  
 pantoprazole 40 mg tablet

## 2018-03-20 NOTE — IP AVS SNAPSHOT
[address] [facility] [address] 
 
 
 [address] [address] [place] Patient: Debby Staton MRN: [id number] :1965 About your hospitalization You were admitted on:  2018 You last received care in the:  [facility] [address] You were discharged on:  2018 Why you were hospitalized Your primary diagnosis was:  Gallstone Pancreatitis Your diagnoses also included:  Dehydration, Acute Gallstone Pancreatitis, Choledocholithiasis Follow-up Information Follow up With Details Comments Contact Info Jonathan Mccormick, [address] [place] [phone] 
[phone] Kennedy Jolly MD On 2018 @ 1:30 pm [address] [address] 
[phone] [facility] [place] will make appts on 3/26/18. CDB. US    
  
Discharge Orders Procedure Order Date Status Priority Quantity Spec Type Associated Dx DIET CARDIAC No options chosen 18 1528 Normal Routine 1 Questions: Additional options:  No options chosen ACTIVITY AFTER DISCHARGE Patient should: Restrict driving 10/00/06 0663 Normal Routine 1 Questions: Patient should:  Restrict driving A check reyes indicates which time of day the medication should be taken. My Medications START taking these medications Instructions Each Dose to Equal  
 Morning Noon Evening Bedtime  
 amLODIPine 2.5 mg tablet Commonly known as:  Tigist Clemons Your last dose was: Your next dose is: Take 1 Tab by mouth daily. Indications: do not bharati it if your upper blood pressure is less than 120  
 2.5 mg  
    
   
   
   
  
 metoprolol tartrate 25 mg tablet Commonly known as:  LOPRESSOR Your last dose was: Your next dose is: Take 1 Tab by mouth every twelve (12) hours.  Indications: do not take it if your upper blood pressure is less than 110  
 25 mg  
    
   
   
   
  
 oxyCODONE-acetaminophen 7.5-325 mg per tablet Commonly known as:  PERCOCET 7.5 Your last dose was: Your next dose is: Take 1 Tab by mouth every four (4) hours as needed. Max Daily Amount: 6 Tabs. Indications: Pain 1 Tab  
    
   
   
   
  
 pantoprazole 40 mg tablet Commonly known as:  PROTONIX Start taking on:  3/25/2018 Your last dose was: Your next dose is: Take 1 Tab by mouth Daily (before breakfast). 40 mg CONTINUE taking these medications Instructions Each Dose to Equal  
 Morning Noon Evening Bedtime ADVAIR DISKUS 250-50 mcg/dose diskus inhaler Generic drug:  fluticasone-salmeterol Your last dose was: Your next dose is: Take 1 Puff by inhalation every twelve (12) hours. 1 Puff  
    
   
   
   
  
 albuterol 90 mcg/actuation inhaler Commonly known as:  [place] Your last dose was: Your next dose is: Take 1-2 Puffs by inhalation every four (4) hours as needed for Wheezing. 1-2 Puff  
    
   
   
   
  
 ondansetron 8 mg disintegrating tablet Commonly known as:  ZOFRAN ODT Your last dose was: Your next dose is: Take 1 Tab by mouth every eight (8) hours as needed for Nausea. 8 mg TYLENOL EXTRA STRENGTH 500 mg tablet Generic drug:  acetaminophen Your last dose was: Your next dose is: Take  by mouth every six (6) hours as needed for Pain. VITAMIN B-12 PO Your last dose was: Your next dose is: Take  by mouth. STOP taking these medications BENADRYL 25 mg capsule Generic drug:  diphenhydrAMINE  
   
  
 ergocalciferol 50,000 unit capsule Commonly known as:  VITAMIN D2  
   
  
 promethazine 25 mg tablet Commonly known as:  PHENERGAN  
   
  
 traMADol 50 mg tablet Commonly known as:  ULTRAM  
   
  
 triamcinolone acetonide 0.1 % ointment Commonly known as:  KENALOG Where to Get Your Medications Information on where to get these meds will be given to you by the nurse or doctor. ! Ask your nurse or doctor about these medications  
  amLODIPine 2.5 mg tablet  
 metoprolol tartrate 25 mg tablet  
 ondansetron 8 mg disintegrating tablet  
 oxyCODONE-acetaminophen 7.5-325 mg per tablet  
 pantoprazole 40 mg tablet Discharge Instructions Patient armband removed and shredded Cholecystectomy: What to Expect at [facility] Your Recovery After your surgery, it is normal to feel weak and tired for several days after you return home. Your belly may be swollen. If you had laparoscopic surgery, you may also have pain in your shoulder for about 24 hours. You may have gas or need to burp a lot at first, and a few people get diarrhea. The diarrhea usually goes away in 2 to 4 weeks, but it may last longer. How quickly you recover depends on whether you had a laparoscopic or open surgery. · For a laparoscopic surgery, most people can go back to work or their normal routine in 1 to 2 weeks, but it may take longer, depending on the type of work you do. · For an open surgery, it will probably take 4 to 6 weeks before you get back to your normal routine. This care sheet gives you a general idea about how long it will take for you to recover. However, each person recovers at a different pace. Follow the steps below to get better as quickly as possible. How can you care for yourself at home? Activity ? · Rest when you feel tired. Getting enough sleep will help you recover. ? · Try to walk each day. Start out by walking a little more than you did the day before. Gradually increase the amount you walk.  Walking boosts blood flow and helps prevent pneumonia and constipation. ? · For about 2 to 4 weeks, avoid lifting anything that would make you strain. This may include a child, heavy grocery bags and milk containers, a heavy briefcase or backpack, cat litter or dog food bags, or a vacuum . ? · Avoid strenuous activities, such as biking, jogging, weightlifting, and aerobic exercise, until your doctor says it is okay. ? · You may shower 24 to 48 hours after surgery, if your doctor okays it. Pat the cut (incision) dry. Do not take a bath for the first 2 weeks, or until your doctor tells you it is okay. ? · You may drive when you are no longer taking pain medicine and can quickly move your foot from the gas pedal to the brake. You must also be able to sit comfortably for a long period of time, even if you do not plan to go far. You might get caught in traffic. ? · For a laparoscopic surgery, most people can go back to work or their normal routine in 1 to 2 weeks, but it may take longer. For an open surgery, it will probably take 4 to 6 weeks before you get back to your normal routine. ? · Your doctor will tell you when you can have sex again. ? Diet ? · Eat smaller meals more often instead of fewer larger meals. You can eat a normal diet, but avoid eating fatty foods for about 1 month. Fatty foods include hamburger, whole milk, cheese, and many snack foods. If your stomach is upset, try bland, low-fat foods like plain rice, broiled chicken, toast, and yogurt. ? · Drink plenty of fluids (unless your doctor tells you not to). ? · If you have diarrhea, try avoiding spicy foods, dairy products, fatty foods, and alcohol. You can also watch to see if specific foods cause it, and stop eating them. If the diarrhea continues for more than 2 weeks, talk to your doctor. ? · You may notice that your bowel movements are not regular right after your surgery. This is common.  Try to avoid constipation and straining with bowel movements. You may want to take a fiber supplement every day. If you have not had a bowel movement after a couple of days, ask your doctor about taking a mild laxative. Medicines ? · Your doctor will tell you if and when you can restart your medicines. He or she will also give you instructions about taking any new medicines. ? · If you take blood thinners, such as warfarin (Coumadin), clopidogrel (Plavix), or aspirin, be sure to talk to your doctor. He or she will tell you if and when to start taking those medicines again. Make sure that you understand exactly what your doctor wants you to do. ? · Take pain medicines exactly as directed. ¨ If the doctor gave you a prescription medicine for pain, take it as prescribed. ¨ If you are not taking a prescription pain medicine, take an over-the-counter medicine such as acetaminophen (Tylenol), ibuprofen (Advil, Motrin), or naproxen (Aleve). Read and follow all instructions on the label. ¨ Do not take two or more pain medicines at the same time unless the doctor told you to. Many pain medicines contain acetaminophen, which is Tylenol. Too much Tylenol can be harmful. ? · If you think your pain medicine is making you sick to your stomach: 
¨ Take your medicine after meals (unless your doctor tells you not to). ¨ Ask your doctor for a different pain medicine. ? · If your doctor prescribed antibiotics, take them as directed. Do not stop taking them just because you feel better. You need to take the full course of antibiotics. Incision care ? · If you have strips of tape on the incision, or cut, leave the tape on for a week or until it falls off.  
? · After 24 to 48 hours, wash the area daily with warm, soapy water, and pat it dry. ? · You may have staples to hold the cut together. Keep them dry until your doctor takes them out. This is usually in 7 to 10 days. ? · Keep the area clean and dry.  You may cover it with a gauze bandage if it weeps or rubs against clothing. Change the bandage every day. ?Ice ? · To reduce swelling and pain, put ice or a cold pack on your belly for 10 to 20 minutes at a time. Do this every 1 to 2 hours. Put a thin cloth between the ice and your skin. Follow-up care is a key part of your treatment and safety. Be sure to make and go to all appointments, and call your doctor if you are having problems. It's also a good idea to know your test results and keep a list of the medicines you take. When should you call for help? Call 911 anytime you think you may need emergency care. For example, call if: 
? · You passed out (lost consciousness). ? · You are short of breath. John Rebekah ? Call your doctor now or seek immediate medical care if: 
? · You are sick to your stomach and cannot drink fluids. ? · You have pain that does not get better when you take your pain medicine. ? · You cannot pass stools or gas. ? · You have signs of infection, such as: 
¨ Increased pain, swelling, warmth, or redness. ¨ Red streaks leading from the incision. ¨ Pus draining from the incision. ¨ A fever. ? · Bright red blood has soaked through the bandage over your incision. ? · You have loose stitches, or your incision comes open. ? · You have signs of a blood clot in your leg (called a deep vein thrombosis), such as: 
¨ Pain in your calf, back of knee, thigh, or groin. ¨ Redness and swelling in your leg or groin. ? Watch closely for any changes in your health, and be sure to contact your doctor if you have any problems. Where can you learn more? Go to http://giselle-pradeep.info/. Enter [phone] in the search box to learn more about \"Cholecystectomy: What to Expect at Home. \" Current as of: May 12, 2017 Content Version: 11.4 © 7950-1085 Healthwise, Incorporated.  Care instructions adapted under license by [employer] (which disclaims liability or warranty for this information). If you have questions about a medical condition or this instruction, always ask your healthcare professional. [address] any warranty or liability for your use of this information. DISCHARGE SUMMARY from Nurse PATIENT INSTRUCTIONS: 
 
 
F-face looks uneven A-arms unable to move or move unevenly S-speech slurred or non-existent T-time-call 911 as soon as signs and symptoms begin-DO NOT go Back to bed or wait to see if you get better-TIME IS BRAIN. Warning Signs of HEART ATTACK Call 911 if you have these symptoms: 
? Chest discomfort. Most heart attacks involve discomfort in the center of the chest that lasts more than a few minutes, or that goes away and comes back. It can feel like uncomfortable pressure, squeezing, fullness, or pain. ? Discomfort in other areas of the upper body. Symptoms can include pain or discomfort in one or both arms, the back, neck, jaw, or stomach. ? Shortness of breath with or without chest discomfort. ? Other signs may include breaking out in a cold sweat, nausea, or lightheadedness. Don't wait more than five minutes to call [address]! Fast action can save your life. Calling 911 is almost always the fastest way to get lifesaving treatment. Emergency Medical Services staff can begin treatment when they arrive  up to an hour sooner than if someone gets to the hospital by car. The discharge information has been reviewed with the patient. The patient verbalized understanding. Discharge medications reviewed with the patient and appropriate educational materials and side effects teaching were provided.  
___________________________________________________________________________ ________________________________________________________ [employer] Announcement We are excited to announce that we are making your provider's discharge notes available to you in ADINCON. You will see these notes when they are completed and signed by the physician that discharged you from your recent hospital stay. If you have any questions or concerns about any information you see in ADINCON, please call the Health Information Department where you were seen or reach out to your Primary Care Provider for more information about your plan of care. Introducing [facility]! [facility] introduces ADINCON patient portal. Now you can access parts of your medical record, email your doctor's office, and request medication refills online. 1. In your internet browser, go to [email or web address] 2. Click on the First Time User? Click Here link in the Sign In box. You will see the New Member Sign Up page. 3. Enter your ADINCON Access Code exactly as it appears below. You will not need to use this code after youve completed the sign-up process. If you do not sign up before the expiration date, you must request a new code. · ADINCON Access Code: [email or web address] Expires: 5/18/2018 11:23 PM 
 
4. Enter the last four digits of your Social Security Number (xxxx) and Date of Birth (mm/dd/yyyy) as indicated and click Submit. You will be taken to the next sign-up page. 5. Create a ADINCON ID. This will be your ADINCON login ID and cannot be changed, so think of one that is secure and easy to remember. 6. Create a ADINCON password. You can change your password at any time. 7. Enter your Password Reset Question and Answer. This can be used at a later time if you forget your password. 8. Enter your e-mail address. You will receive e-mail notification when new information is available in [address] 9. Click Sign Up. You can now view and download portions of your medical record. 10. Click the Download Summary menu link to download a portable copy of your medical information. If you have questions, please visit the Frequently Asked Questions section of the MyChart website. Remember, Phys[employer] is NOT to be used for urgent needs. For medical emergencies, dial 911. Now available from your iPhone and Android! Providers Seen During Your Hospitalization Provider Specialty Primary office phone Dilcia Beckford DO Emergency Medicine [phone] Eusebio Levi MD Internal Medicine [phone] Vanessa Eastman MD Internal Medicine [phone] Nurys Solano MD Internal Medicine [phone] Your Primary Care Physician (PCP) Primary Care Physician Office Phone Office Fax [address] [facility], [address] [place] [address] [phone] You are allergic to the following Allergen Reactions Iron Anaphylaxis Other Medication Nausea and Vomiting All \"mycins\" Penicillins Rash  
    
 Sulfa (Sulfonamide Antibiotics) Hives Venofer (Iron Sucrose) Rash Itching Recent Documentation Height Weight Breastfeeding? BMI OB Status Smoking Status 1.626 m 73.9 kg No 27.98 kg/m2 Menopause Never Smoker Emergency Contacts Name Discharge Info Relation Home Work Mobile Damien Mooreley DISCHARGE CAREGIVER [3] Spouse [3] [phone] WandaFelipe esparza DISCHARGE CAREGIVER [3] Child [2] [phone] Patient Belongings The following personal items are in your possession at time of discharge: 
  Dental Appliances: None  Visual Aid: None      Home Medications: None   Jewelry: None  Clothing: None (in pt room)    Other Valuables: Cell Phone Please provide this summary of care documentation to your next provider. Signatures-by signing, you are acknowledging that this After Visit Summary has been reviewed with you and you have received a copy.   
  
 
  
    
    
 Patient Signature: ____________________________________________________________ Date:  ____________________________________________________________  
  
Rajani Elizabeth Provider Signature:  ____________________________________________________________ Date:  ____________________________________________________________

## 2018-03-21 ENCOUNTER — ANESTHESIA EVENT (OUTPATIENT)
Dept: ENDOSCOPY | Age: 53
DRG: 419 | End: 2018-03-21
Payer: OTHER GOVERNMENT

## 2018-03-21 ENCOUNTER — APPOINTMENT (OUTPATIENT)
Dept: MRI IMAGING | Age: 53
DRG: 419 | End: 2018-03-21
Attending: INTERNAL MEDICINE
Payer: OTHER GOVERNMENT

## 2018-03-21 LAB
ALBUMIN SERPL-MCNC: 3 G/DL (ref 3.4–5)
ALBUMIN/GLOB SERPL: 0.7 {RATIO} (ref 0.8–1.7)
ALP SERPL-CCNC: 173 U/L (ref 45–117)
ALT SERPL-CCNC: 155 U/L (ref 13–56)
ANION GAP SERPL CALC-SCNC: 9 MMOL/L (ref 3–18)
AST SERPL-CCNC: 190 U/L (ref 15–37)
BASOPHILS # BLD: 0 K/UL (ref 0–0.1)
BASOPHILS NFR BLD: 0 % (ref 0–2)
BILIRUB SERPL-MCNC: 0.6 MG/DL (ref 0.2–1)
BUN SERPL-MCNC: 6 MG/DL (ref 7–18)
BUN/CREAT SERPL: 9 (ref 12–20)
CALCIUM SERPL-MCNC: 8.7 MG/DL (ref 8.5–10.1)
CHLORIDE SERPL-SCNC: 105 MMOL/L (ref 100–108)
CO2 SERPL-SCNC: 26 MMOL/L (ref 21–32)
CREAT SERPL-MCNC: 0.7 MG/DL (ref 0.6–1.3)
DIFFERENTIAL METHOD BLD: ABNORMAL
EOSINOPHIL # BLD: 0 K/UL (ref 0–0.4)
EOSINOPHIL NFR BLD: 0 % (ref 0–5)
ERYTHROCYTE [DISTWIDTH] IN BLOOD BY AUTOMATED COUNT: 14.1 % (ref 11.6–14.5)
GLOBULIN SER CALC-MCNC: 4.3 G/DL (ref 2–4)
GLUCOSE SERPL-MCNC: 102 MG/DL (ref 74–99)
HCT VFR BLD AUTO: 38.4 % (ref 35–45)
HGB BLD-MCNC: 13 G/DL (ref 12–16)
LIPASE SERPL-CCNC: 4498 U/L (ref 73–393)
LYMPHOCYTES # BLD: 1.5 K/UL (ref 0.9–3.6)
LYMPHOCYTES NFR BLD: 16 % (ref 21–52)
MAGNESIUM SERPL-MCNC: 1.9 MG/DL (ref 1.6–2.6)
MCH RBC QN AUTO: 27.2 PG (ref 24–34)
MCHC RBC AUTO-ENTMCNC: 33.9 G/DL (ref 31–37)
MCV RBC AUTO: 80.3 FL (ref 74–97)
MONOCYTES # BLD: 0.6 K/UL (ref 0.05–1.2)
MONOCYTES NFR BLD: 6 % (ref 3–10)
NEUTS SEG # BLD: 7.3 K/UL (ref 1.8–8)
NEUTS SEG NFR BLD: 78 % (ref 40–73)
PLATELET # BLD AUTO: 252 K/UL (ref 135–420)
PMV BLD AUTO: 10.8 FL (ref 9.2–11.8)
POTASSIUM SERPL-SCNC: 3.4 MMOL/L (ref 3.5–5.5)
PROT SERPL-MCNC: 7.3 G/DL (ref 6.4–8.2)
RBC # BLD AUTO: 4.78 M/UL (ref 4.2–5.3)
SODIUM SERPL-SCNC: 140 MMOL/L (ref 136–145)
WBC # BLD AUTO: 9.5 K/UL (ref 4.6–13.2)

## 2018-03-21 PROCEDURE — C9113 INJ PANTOPRAZOLE SODIUM, VIA: HCPCS | Performed by: INTERNAL MEDICINE

## 2018-03-21 PROCEDURE — 74011250636 HC RX REV CODE- 250/636: Performed by: INTERNAL MEDICINE

## 2018-03-21 PROCEDURE — A9577 INJ MULTIHANCE: HCPCS | Performed by: INTERNAL MEDICINE

## 2018-03-21 PROCEDURE — 36415 COLL VENOUS BLD VENIPUNCTURE: CPT | Performed by: INTERNAL MEDICINE

## 2018-03-21 PROCEDURE — 83690 ASSAY OF LIPASE: CPT | Performed by: INTERNAL MEDICINE

## 2018-03-21 PROCEDURE — 94640 AIRWAY INHALATION TREATMENT: CPT

## 2018-03-21 PROCEDURE — 74011000250 HC RX REV CODE- 250: Performed by: INTERNAL MEDICINE

## 2018-03-21 PROCEDURE — 74011000258 HC RX REV CODE- 258: Performed by: INTERNAL MEDICINE

## 2018-03-21 PROCEDURE — 83735 ASSAY OF MAGNESIUM: CPT | Performed by: INTERNAL MEDICINE

## 2018-03-21 PROCEDURE — 74011250636 HC RX REV CODE- 250/636

## 2018-03-21 PROCEDURE — 85025 COMPLETE CBC W/AUTO DIFF WBC: CPT | Performed by: INTERNAL MEDICINE

## 2018-03-21 PROCEDURE — 74183 MRI ABD W/O CNTR FLWD CNTR: CPT

## 2018-03-21 PROCEDURE — 65270000029 HC RM PRIVATE

## 2018-03-21 PROCEDURE — 80053 COMPREHEN METABOLIC PANEL: CPT | Performed by: INTERNAL MEDICINE

## 2018-03-21 RX ORDER — MORPHINE SULFATE 8 MG/ML
2-4 INJECTION, SOLUTION INTRAMUSCULAR; INTRAVENOUS
Status: DISCONTINUED | OUTPATIENT
Start: 2018-03-21 | End: 2018-03-22

## 2018-03-21 RX ORDER — MORPHINE SULFATE 8 MG/ML
INJECTION, SOLUTION INTRAMUSCULAR; INTRAVENOUS
Status: COMPLETED
Start: 2018-03-21 | End: 2018-03-21

## 2018-03-21 RX ADMIN — ALBUTEROL SULFATE 2.5 MG: 2.5 SOLUTION RESPIRATORY (INHALATION) at 22:19

## 2018-03-21 RX ADMIN — ALBUTEROL SULFATE 2.5 MG: 2.5 SOLUTION RESPIRATORY (INHALATION) at 16:15

## 2018-03-21 RX ADMIN — ALBUTEROL SULFATE 2.5 MG: 2.5 SOLUTION RESPIRATORY (INHALATION) at 13:54

## 2018-03-21 RX ADMIN — HEPARIN SODIUM 5000 UNITS: 5000 INJECTION, SOLUTION INTRAVENOUS; SUBCUTANEOUS at 11:08

## 2018-03-21 RX ADMIN — ONDANSETRON HYDROCHLORIDE 4 MG: 2 INJECTION, SOLUTION INTRAMUSCULAR; INTRAVENOUS at 00:43

## 2018-03-21 RX ADMIN — MORPHINE SULFATE 4 MG: 4 INJECTION, SOLUTION INTRAMUSCULAR; INTRAVENOUS at 08:25

## 2018-03-21 RX ADMIN — MORPHINE SULFATE 4 MG: 8 INJECTION, SOLUTION INTRAMUSCULAR; INTRAVENOUS at 16:22

## 2018-03-21 RX ADMIN — GADOBENATE DIMEGLUMINE 20 ML: 529 INJECTION, SOLUTION INTRAVENOUS at 21:14

## 2018-03-21 RX ADMIN — HEPARIN SODIUM 5000 UNITS: 5000 INJECTION, SOLUTION INTRAVENOUS; SUBCUTANEOUS at 18:48

## 2018-03-21 RX ADMIN — METRONIDAZOLE 250 MG: 500 INJECTION, SOLUTION INTRAVENOUS at 01:40

## 2018-03-21 RX ADMIN — ONDANSETRON HYDROCHLORIDE 4 MG: 2 INJECTION, SOLUTION INTRAMUSCULAR; INTRAVENOUS at 19:58

## 2018-03-21 RX ADMIN — METRONIDAZOLE 250 MG: 500 INJECTION, SOLUTION INTRAVENOUS at 16:00

## 2018-03-21 RX ADMIN — HEPARIN SODIUM 5000 UNITS: 5000 INJECTION, SOLUTION INTRAVENOUS; SUBCUTANEOUS at 00:42

## 2018-03-21 RX ADMIN — MORPHINE SULFATE 4 MG: 4 INJECTION, SOLUTION INTRAMUSCULAR; INTRAVENOUS at 01:40

## 2018-03-21 RX ADMIN — MORPHINE SULFATE 4 MG: 8 INJECTION, SOLUTION INTRAMUSCULAR; INTRAVENOUS at 21:54

## 2018-03-21 RX ADMIN — PANTOPRAZOLE SODIUM 40 MG: 40 INJECTION, POWDER, FOR SOLUTION INTRAVENOUS at 11:07

## 2018-03-21 RX ADMIN — METRONIDAZOLE 250 MG: 500 INJECTION, SOLUTION INTRAVENOUS at 21:55

## 2018-03-21 RX ADMIN — HEPARIN SODIUM 5000 UNITS: 5000 INJECTION, SOLUTION INTRAVENOUS; SUBCUTANEOUS at 23:01

## 2018-03-21 RX ADMIN — SODIUM CHLORIDE 150 ML/HR: 900 INJECTION, SOLUTION INTRAVENOUS at 05:32

## 2018-03-21 RX ADMIN — SODIUM CHLORIDE 150 ML/HR: 900 INJECTION, SOLUTION INTRAVENOUS at 00:43

## 2018-03-21 RX ADMIN — METRONIDAZOLE 250 MG: 500 INJECTION, SOLUTION INTRAVENOUS at 08:24

## 2018-03-21 RX ADMIN — LIDOCAINE HYDROCHLORIDE: 10 INJECTION, SOLUTION EPIDURAL; INFILTRATION; INTRACAUDAL; PERINEURAL at 21:56

## 2018-03-21 RX ADMIN — CIPROFLOXACIN 400 MG: 2 INJECTION, SOLUTION INTRAVENOUS at 18:41

## 2018-03-21 RX ADMIN — CIPROFLOXACIN 400 MG: 2 INJECTION, SOLUTION INTRAVENOUS at 05:27

## 2018-03-21 RX ADMIN — MORPHINE SULFATE 4 MG: 4 INJECTION, SOLUTION INTRAMUSCULAR; INTRAVENOUS at 16:16

## 2018-03-21 RX ADMIN — PANTOPRAZOLE SODIUM 40 MG: 40 INJECTION, POWDER, FOR SOLUTION INTRAVENOUS at 00:43

## 2018-03-21 NOTE — CONSULTS
WWW.Fiiiling  [phone]    Impression:   1.abdominal pain/nausea/vomit- improved  2. Gallstone pancreatitis- Lipase 4498 today  -3/20/2018- ultrasound- Thickened gallbladder wall with possible fluid in the wall vs. adjacent to the  gallbladder in the setting of gallstones. Concerning for acute cholecystitis. Dilated common bile duct. Possible sludge or stones within the common duct as  Well.  -3/20/18- Ct abdomen-   Acute pancreatitis. Chronic and stable mild dilation of the CBD for many years. Choledocholithiasis cannot be excluded by CT. Cholelithiasis and nonspecific gallbladder wall thickening. 3. Elevated LFTs-  4. Gerd  5. H/O small bowel obstruction 2006        Plan:     1. Pending MRCP  2. Cont NPO  3.cont PPI. Cont monitor CBC, LFTs, BILI  4. Cont supportive therapy. Aggressive IV hydration      Chief Complaint: abdominal pain, nausea and vomiting      HPI:  Emelyn Ahuja is a 47 y.o. female who is being seen on consult for 2 day h/o abdominal pain, nausea and vomiting worse after eating. Pt with h/o SBO, gallstones. Pt reluctant to do surgery for gallstones about a year ago. Pt denies ETOH, ilicit drug use. Pt had SBO surgery in 2006 at [facility] to obtain records    PMH:   Past Medical History:   Diagnosis Date    Anemia     Asthma     BMI 34.0-34.9,adult 5/11/2017    Bowel obstruction     Gall stones     GERD (gastroesophageal reflux disease)     History of blood transfusion     Hypertension     Sickle cell trait (HCC)        PSH:   Past Surgical History:   Procedure Laterality Date    ABDOMEN SURGERY PROC UNLISTED  2006    surgery for bowel obstruction    HX APPENDECTOMY      HX TUBAL LIGATION         Social HX:   Social History     Social History    Marital status:      Spouse name: N/A    Number of children: N/A    Years of education: N/A     Occupational History    Not on file.      Social History Main Topics    Smoking status: Never Smoker    Smokeless tobacco: Never Used    Alcohol use No    Drug use: No    Sexual activity: Not on file     Other Topics Concern    Not on file     Social History Narrative       FHX:   Family History   Problem Relation Age of Onset    Cancer Mother     Cancer Brother        Allergy:   Allergies   Allergen Reactions    Iron Anaphylaxis    Other Medication Nausea and Vomiting     All \"mycins\"    Penicillins Rash    Sulfa (Sulfonamide Antibiotics) Hives    Venofer [Iron Sucrose] Rash and Itching       Home Medications:     Prescriptions Prior to Admission   Medication Sig    ondansetron (ZOFRAN ODT) 8 mg disintegrating tablet Take 1 Tab by mouth every eight (8) hours as needed for Nausea.  traMADol (ULTRAM) 50 mg tablet Take 1 Tab by mouth every six (6) hours as needed for Pain. Max Daily Amount: 200 mg.  promethazine (PHENERGAN) 25 mg tablet Take 1 Tab by mouth every six (6) hours as needed for Nausea. Caution: This medication may make you drowsy. Avoid driving while under the influence of this medication.  acetaminophen (TYLENOL EXTRA STRENGTH) 500 mg tablet Take  by mouth every six (6) hours as needed for Pain.  diphenhydrAMINE (BENADRYL) 25 mg capsule Take 25 mg by mouth every six (6) hours as needed.  fluticasone-salmeterol (ADVAIR DISKUS) 250-50 mcg/dose diskus inhaler Take 1 Puff by inhalation every twelve (12) hours.  triamcinolone acetonide (KENALOG) 0.1 % ointment Apply  to affected area two (2) times a day. use thin layer    ergocalciferol (VITAMIN D2) 50,000 unit capsule Take 1 Cap by mouth every seven (7) days.  CYANOCOBALAMIN, VITAMIN B-12, (VITAMIN B-12 PO) Take  by mouth.  albuterol (PROVENTIL, VENTOLIN) 90 mcg/actuation inhaler Take 1-2 Puffs by inhalation every four (4) hours as needed for Wheezing. Review of Systems:     A fourteen point review of systems was obtained, and was negative except per HPI.     Visit Vitals    /82 (BP 1 Location: Right arm)    Pulse 90  Temp 98.6 °F (37 °C)    Resp 18    Ht 5' 4\" (1.626 m)    Wt 73.5 kg (162 lb 0.6 oz)    SpO2 96%    Breastfeeding No    BMI 27.81 kg/m2       Physical Assessment:     constitutional: appearance: well developed, well nourished, in no acute distress. skin: no rashes, jaundice  eyes: inspection: normal conjunctivae and lids; no scleral icterus pupils: equal, round, reactive to light; extraocular movements intact  ENMT: mouth: mucous membranes moist, no thrush  neck:  no masses or tenderness; normal range of motion  respiratory: breath sounds clear, no wheeze/rales/rhonchi  cardiovascular: normal rate, regular rhythm without murmur  abdominal: soft, bowel sounds present, non-tender to palpation without rebound or guarding; no appreciable mass; no appreciable hepatosplenomegaly; no appreciable fluid wave  extremities: no clubbing, cyanosis, edema  neurologic:  Cranial nerves II-XII grossly intact; no asterixis   psychiatric:  oriented to time, space and person. Basic Metabolic Profile   Recent Labs      03/21/18   0410   NA  140   K  3.4*   CL  105   CO2  26   BUN  6*   GLU  102*   CA  8.7   MG  1.9         CBC w/Diff    Recent Labs      03/21/18   0410   WBC  9.5   RBC  4.78   HGB  13.0   HCT  38.4   MCV  80.3   MCH  27.2   MCHC  33.9   RDW  14.1   PLT  252    Recent Labs      03/21/18   0410   GRANS  78*   LYMPH  16*   EOS  0        Hepatic Function   Recent Labs      03/21/18   0410   ALB  3.0*   TP  7.3   TBILI  0.6   SGOT  190*   AP  173*   LPSE  4498*          Tiffanie Shannon, NP  [facility]  [email or web address]

## 2018-03-21 NOTE — H&P
History & Physical    Patient: Jayme Vazquez MRN: [id number]  CSN: [id number]    YOB: 1965  Age: 47 y.o. Sex: female      DOA: 3/20/2018    Chief Complaint:   Chief Complaint   Patient presents with    Vomiting    Abdominal Pain          HPI:     Jayme Vazquez is a 47 y.o.  female who hx of Asthma, SBO, Gallstones   Presents with 2 day hx of abdominal pain nausea and vomitting despite taking protonix  Hx of SBO in past known Gallstones 1 year ago reluctant to do surgery [facility] due to to Scar tissue  Denies alcohol use or drug use hx of Asthma  Recent Gastroenteritis thought this was similar   In ER lipase elevated with CT findings as below   Denies change in diet or new nidus/meds     Past Medical History:   Diagnosis Date    Anemia     Asthma     BMI 34.0-34.9,adult 5/11/2017    Bowel obstruction     Gall stones     GERD (gastroesophageal reflux disease)     History of blood transfusion     Hypertension     Sickle cell trait ([facility] [address]        Past Surgical History:   Procedure Laterality Date    ABDOMEN SURGERY PROC UNLISTED  2006    surgery for bowel obstruction    HX APPENDECTOMY      HX TUBAL LIGATION         Family History   Problem Relation Age of Onset    Cancer Mother     Cancer Brother        Social History     Social History    Marital status:      Spouse name: N/A    Number of children: N/A    Years of education: N/A     Social History Main Topics    Smoking status: Never Smoker    Smokeless tobacco: Never Used    Alcohol use No    Drug use: No    Sexual activity: Not Asked     Other Topics Concern    None     Social History Narrative       Prior to Admission medications    Medication Sig Start Date End Date Taking? Authorizing Provider   ondansetron (ZOFRAN ODT) 8 mg disintegrating tablet Take 1 Tab by mouth every eight (8) hours as needed for Nausea.  2/19/18   Collin Green MD   traMADol Lani Damon) 50 mg tablet Take 1 Tab by mouth every six (6) hours as needed for Pain. Max Daily Amount: 200 mg. 2/17/18   Olinda Harkins MD   promethazine (PHENERGAN) 25 mg tablet Take 1 Tab by mouth every six (6) hours as needed for Nausea. Caution: This medication may make you drowsy. Avoid driving while under the influence of this medication. 2/17/18   Olinda Harkins MD   acetaminophen (TYLENOL EXTRA STRENGTH) 500 mg tablet Take  by mouth every six (6) hours as needed for Pain. Historical Provider   diphenhydrAMINE (BENADRYL) 25 mg capsule Take 25 mg by mouth every six (6) hours as needed. Romain Campos MD   fluticasone-salmeterol (ADVAIR DISKUS) 250-50 mcg/dose diskus inhaler Take 1 Puff by inhalation every twelve (12) hours. Romain Campos MD   triamcinolone acetonide (KENALOG) 0.1 % ointment Apply  to affected area two (2) times a day. use thin layer 4/3/15   Cain Hernandes MD   ergocalciferol (VITAMIN D2) 50,000 unit capsule Take 1 Cap by mouth every seven (7) days. 6/27/14   hSreyas Perez NP   CYANOCOBALAMIN, VITAMIN B-12, (VITAMIN B-12 PO) Take  by mouth. Romain Campos MD   albuterol (PROVENTIL, VENTOLIN) 90 mcg/actuation inhaler Take 1-2 Puffs by inhalation every four (4) hours as needed for Wheezing. 1/3/13   Jasmyn Shannon MD       Allergies   Allergen Reactions    Iron Anaphylaxis    Other Medication Nausea and Vomiting     All \"mycins\"    Penicillins Rash    Sulfa (Sulfonamide Antibiotics) Hives    Venofer [Iron Sucrose] Rash and Itching         Review of Systems  GENERAL: Patient alert, awake and oriented times 3, able to communicate full sentences and not in distress. HEENT: No change in vision, no earache, tinnitus, sore throat or sinus congestion. NECK: No pain or stiffness. PULMONARY: No shortness of breath, cough or wheeze. Hx of asthma on ADVAIR  Cardiovascular: no pnd or orthopnea, no CP  GASTROINTESTINAL: +abdominal pain, +nausea, +vomiting or diarrhea, melena or bright red blood per rectum.    GENITOURINARY: No urinary frequency, urgency, hesitancy or dysuria. MUSCULOSKELETAL: No joint or muscle pain, no back pain, no recent trauma. DERMATOLOGIC: No rash, no itching, no lesions. ENDOCRINE: No polyuria, polydipsia, no heat or cold intolerance. No recent change in weight. HEMATOLOGICAL: +anemia or easy bruising or bleeding. NEUROLOGIC: No headache, seizures, numbness, tingling +weakness. Physical Exam:     Physical Exam:  Visit Vitals    BP (!) 144/93 (BP 1 Location: Right arm)    Pulse (!) 103    Temp 98 °F (36.7 °C)    Resp 18    Wt 79.4 kg (175 lb)    SpO2 95%    BMI 30.04 kg/m2      O2 Device: Room air    Temp (24hrs), Av.3 °F (36.8 °C), Min:98 °F (36.7 °C), Max:98.6 °F (37 °C)             General:  Alert, cooperative, no distress, appears stated age. Head: Normocephalic, without obvious abnormality, atraumatic. Eyes:  Conjunctivae/corneas clear. PERRL, EOMs intact. Nose: Nares normal. No drainage or sinus tenderness. Neck: Supple, symmetrical, trachea midline, no adenopathy, thyroid: no enlargement, no carotid bruit and no JVD. Lungs:   Clear to auscultation bilaterally. Heart:  Regular rate and rhythm, S1, S2 normal.     Abdomen: Soft, +-tender. Bowel sounds normal. midepigastric and RUq tenderness  Positive abrams    Extremities: Extremities normal, atraumatic, no cyanosis or edema. Pulses: 2+ and symmetric all extremities. Skin:  No rashes or lesions   Neurologic: AAOx3, No focal motor or sensory deficit. CT Results  (Last 48 hours)               18 1509  CT ABD PELV W CONT Final result    Impression:  IMPRESSION:       1. Acute pancreatitis. 2. Chronic and stable mild dilation of the CBD for many years. Choledocholithiasis cannot be excluded by CT. 3. Cholelithiasis and nonspecific gallbladder wall thickening. This could be   secondary to pancreatic process or additional cholecystitis.    4. Fluid containing distal esophagus suggesting gastroesophageal reflux disease   or dysmotility. 5. Other incidental stable CT findings as above. Narrative:  CT ABDOMEN AND PELVIS WITH ENHANCEMENT       INDICATION: Abdominal pain and vomiting with history of small bowel obstruction. TECHNIQUE: Axial images obtained of the abdomen and pelvis following the   uneventful administration of 100 cc Isovue-300 nonionic intravenous contrast.    Coronal and sagittal reformatted images were obtained. All CT scans are   performed using dose optimization techniques as appropriate to the performed   exam including the following: Automated exposure control, adjustment of mA   and/or kV according to patient size, and use of iterative reconstructive   technique. COMPARISON: CT abdomen and pelvis 2/17/2018. ABDOMEN FINDINGS:        Lung Base: Surgical change at the right lung base. No acute findings. Liver: Unremarkable. Biliary: Gallbladder wall thickening. Probable cholelithiasis. Stable chronic   mild dilation of the CBD. Fluid/fat stranding in the joni hepatis. Spleen: Unremarkable. Pancreas: Peripancreatic fat stranding. No pancreatic ductal dilation or clear   evidence of focal necrosis. Adrenal Glands: Unremarkable. Kidneys: Unremarkable. Peritoneum/ Retroperitoneum: Unremarkable. Lymph Nodes: Unremarkable. Vessels: Unremarkable for age. Lower esophagus/stomach/duodenum: Fluid containing mildly dilated distal   esophagus with mild mucosal hyperenhancement. Surgical clips at the GE junction. Stable appearance of the stomach with gastrostomy scar and prior gastric   surgery. PELVIS FINDINGS:        Bowel: Unremarkable. Appendix not visualized. Bladder/ Pelvic Organs: Stable 5.4 cm and smaller uterine fibroids. Tubal   ligation clips. Bones/Soft tissues: Small fatty stable umbilical hernia. Mild degenerative   changes in the spine. Labs Reviewed:     All lab results for the last 24 hours reviewed. and EKG    Procedures/imaging: see electronic medical records for all procedures/Xrays and details which were not copied into this note but were reviewed prior to creation of Plan  Recent Results (from the past 24 hour(s))   EKG, 12 LEAD, INITIAL    Collection Time: 03/20/18 12:54 PM   Result Value Ref Range    Ventricular Rate 100 BPM    Atrial Rate 100 BPM    P-R Interval 146 ms    QRS Duration 78 ms    Q-T Interval 378 ms    QTC Calculation (Bezet) 487 ms    Calculated P Axis 59 degrees    Calculated R Axis -4 degrees    Calculated T Axis 38 degrees    Diagnosis       Sinus rhythm with  Prolonged QT  Abnormal ECG  When compared with ECG of 19-OCT-2017 12:33,  Vent. rate has increased BY  36 BPM  Confirmed by Henrietta Penny ([id number]) on 3/20/2018 7:45:11 PM     CBC WITH AUTOMATED DIFF    Collection Time: 03/20/18  1:00 PM   Result Value Ref Range    WBC 8.8 4.6 - 13.2 K/uL    RBC 5.42 (H) 4.20 - 5.30 M/uL    HGB 14.3 12.0 - 16.0 g/dL    HCT 43.0 35.0 - 45.0 %    MCV 79.3 74.0 - 97.0 FL    MCH 26.4 24.0 - 34.0 PG    MCHC 33.3 31.0 - 37.0 g/dL    RDW 14.1 11.6 - 14.5 %    PLATELET 553 171 - 218 K/uL    MPV 10.6 9.2 - 11.8 FL    NEUTROPHILS 74 (H) 40 - 73 %    LYMPHOCYTES 18 (L) 21 - 52 %    MONOCYTES 7 3 - 10 %    EOSINOPHILS 1 0 - 5 %    BASOPHILS 0 0 - 2 %    ABS. NEUTROPHILS 6.5 1.8 - 8.0 K/UL    ABS. LYMPHOCYTES 1.6 0.9 - 3.6 K/UL    ABS. MONOCYTES 0.6 0.05 - 1.2 K/UL    ABS. EOSINOPHILS 0.0 0.0 - 0.4 K/UL    ABS.  BASOPHILS 0.0 0.0 - 0.06 K/UL    DF AUTOMATED     METABOLIC PANEL, COMPREHENSIVE    Collection Time: 03/20/18  1:00 PM   Result Value Ref Range    Sodium 140 136 - 145 mmol/L    Potassium 4.0 3.5 - 5.5 mmol/L    Chloride 105 100 - 108 mmol/L    CO2 24 21 - 32 mmol/L    Anion gap 11 3.0 - 18 mmol/L    Glucose 182 (H) 74 - 99 mg/dL    BUN 11 7.0 - 18 MG/DL    Creatinine 0.86 0.6 - 1.3 MG/DL    BUN/Creatinine ratio 13 12 - 20      GFR est AA >60 >60 ml/min/1.73m2    GFR est non-AA >60 >60 ml/min/1.73m2    Calcium 9.5 8.5 - 10.1 MG/DL    Bilirubin, total 0.8 0.2 - 1.0 MG/DL    ALT (SGPT) 99 (H) 13 - 56 U/L    AST (SGOT) 246 (H) 15 - 37 U/L    Alk. phosphatase 189 (H) 45 - 117 U/L    Protein, total 8.7 (H) 6.4 - 8.2 g/dL    Albumin 3.9 3.4 - 5.0 g/dL    Globulin 4.8 (H) 2.0 - 4.0 g/dL    A-G Ratio 0.8 0.8 - 1.7     LIPASE    Collection Time: 03/20/18  1:00 PM   Result Value Ref Range    Lipase 00334 (H) 73 - 393 U/L   TROPONIN I    Collection Time: 03/20/18  1:00 PM   Result Value Ref Range    Troponin-I, Qt. <0.02 0.00 - 0.06 NG/ML   MAGNESIUM    Collection Time: 03/20/18  1:00 PM   Result Value Ref Range    Magnesium 1.7 1.6 - 2.6 mg/dL   HEMOGLOBIN A1C W/O EAG    Collection Time: 03/20/18  1:00 PM   Result Value Ref Range    Hemoglobin A1c 5.7 (H) 4.2 - 5.6 %   URINALYSIS W/ RFLX MICROSCOPIC    Collection Time: 03/20/18  2:00 PM   Result Value Ref Range    Color YELLOW      Appearance CLEAR      Specific gravity 1.013 1.005 - 1.030      pH (UA) 6.0 5.0 - 8.0      Protein NEGATIVE  NEG mg/dL    Glucose NEGATIVE  NEG mg/dL    Ketone NEGATIVE  NEG mg/dL    Bilirubin NEGATIVE  NEG      Blood NEGATIVE  NEG      Urobilinogen 1.0 0.2 - 1.0 EU/dL    Nitrites NEGATIVE  NEG      Leukocyte Esterase TRACE (A) NEG     URINE MICROSCOPIC ONLY    Collection Time: 03/20/18  2:00 PM   Result Value Ref Range    WBC 0 to 3 0 - 4 /hpf    RBC NONE 0 - 5 /hpf    Epithelial cells 1+ 0 - 5 /lpf    Bacteria FEW (A) NEG /hpf       Assessment/Plan     Active Problems:    Gallstone pancreatitis (3/20/2018)  HYpokalemia  GERD  Asthma    Plan  Clear liquid  NPO after MN  Cipro/Flagyl  Ordered MRCP start IV Protonix bid   Surgical removal of Gall bladder after MRCP GI consult   Duoneb  Incentive Spirometer   Strict NPO except may have ice chips. Would advance diet as tolerated. continue IV Fluids.   Serial amylase, lipase and LFT's, will also check triglyceride level   Strict I & O and daily wts. & monitor for ARF   O2 sats Q4 hrs and notify MD if <90%, and monitor for ARDS   Close monitoring of VS   Incentive spirometer Q2hrs. While awake   Analgesics and antiemetics prn           D ehydration      DVT/GI Prophylaxis: Hep SQ    Discussed with patient at bedside about hospital admission and my plan care, who understood and agree with my plan care.     Kim aSndoval MD  3/20/2018 11:43 PM

## 2018-03-21 NOTE — ED NOTES
[facility] at bedside for transport to [facility] for admission.

## 2018-03-21 NOTE — PROGRESS NOTES
Care Management Interventions  Current Support Network: Lives with Spouse  Confirm Follow Up Transport: Family    47 yr old female admit with Gallstone/pancreatitis/dehydration. Patient states she lives at home with her spouse and address was verified with patient. Patient denies any DME/home 02/[facility] and states that she is independent with all her ADL's. Patient verbalized no needs at this time, will remain available.

## 2018-03-21 NOTE — ROUTINE PROCESS
TRANSFER - OUT REPORT:    Verbal report given to Ludin Echols RN on Marcos Goetz  being transferred to [address] [place] [address], room 421 for routine progression of care       Report consisted of patients Situation, Background, Assessment and   Recommendations(SBAR). Information from the following report(s) ED Summary, MAR and Recent Results was reviewed with the receiving nurse. Lines:   Peripheral IV 03/20/18 Right Wrist (Active)   Site Assessment Clean, dry, & intact 3/20/2018  1:00 PM   Phlebitis Assessment 0 3/20/2018  1:00 PM   Infiltration Assessment 0 3/20/2018  1:00 PM   Dressing Status Clean, dry, & intact 3/20/2018  1:00 PM   Dressing Type Transparent 3/20/2018  1:00 PM   Hub Color/Line Status Blue 3/20/2018  1:00 PM        Opportunity for questions and clarification was provided.       Patient transported with:   IV lock

## 2018-03-21 NOTE — CONSULTS
[address] [facility]    Elbert Madrigal  MR#: [id number]  : 1965  ACCOUNT #: [de-identified]   DATE OF SERVICE: 2018    CONSULTING PHYSICIAN:  Dr. Juvencio Christy, emergency room at [facility]. REASON FOR CONSULTATION:  Cholelithiasis and pancreatitis. HISTORY OF PRESENT ILLNESS:  The patient is a very pleasant 67-year-old -American female with a known history of cholelithiasis for at least one year's time. She was offered surgery at the [facility], however, was afraid and did not proceed on with surgery. She has developed nausea, vomiting and abdominal pain three days ago on . She has had no fevers. No episodes of jaundice, no lightening of her stools or darkening of her urine and she was admitted from [facility] last evening after she presented to that emergency room and an abdominal CAT scan shows pancreatitis as well as some mild inflammation around her gallbladder. The ultrasound showed numerous gallstones and a 1 cm common bile duct, which apparently has been stable for some time. The ultrasound; however, did suggest the possibility of stones within the common bile duct and the gastroenterology service has been consulted. I spoke with Dr. Zahraa Marino and he has requested an MRCP be performed. PAST MEDICAL HISTORY:  Also notable for asthma as well as a history of heartburn and reflux disease. Includes hypertension and sickle cell trait, although I note she is not on any blood pressure medications. PAST SURGICAL HISTORY:  Her past surgeries have included an open appendectomy many, many years ago. She has had a tubal ligation after the birth of her 2 sons and she had some type of a midline incision for reflux and ulcer disease. She believes that this was done in  at [facility] and they resected some portion of her bowel. She had a long one and a half year recovery at home afterward with a feeding tube.   It is unclear to me what this surgery entailed. However, she does have a long midline incision. CURRENT MEDICATIONS:  Tramadol, Phenergan, Tylenol, Benadryl, Advair Diskus, vitamin D2 50,000 units once a week, B12 and an albuterol inhaler. FAMILY HISTORY:  Her mother had colon cancer. Her father also had some type of cancer. SOCIAL HISTORY:  She has never smoked. She does not drink alcohol. She is . ALLERGIES:  SHE REPORTS SEVERAL ALLERGIES  INCLUDING THAT TO IRON CAUSING ANAPHYLAXIS. MANY OF THE MYCINS CAUSE NAUSEA AND VOMITING, PENICILLIN CAUSES A RASH. SULFA CAUSES HIVES AND VENOFER CAUSES RASH AND ITCHING. REVIEW OF SYSTEMS:  She reports no fever, no changes in her weight. She states to be 5 foot 4 and is 73.5 kg. She has no chest pain. She does have abdominal pain, which is markedly improved. She denies significant back pain. She has had no episodes of jaundice, no darkening of the urine or lightening of her stools. No thromboembolic phenomenon. No easy bleeding. PHYSICAL EXAMINATION:  GENERAL:  She is well-appearing, in no acute distress. VITAL SIGNS:  From the emergency room, her blood pressure 147/76, pulse of 110 initially, this has dropped. Her temperature was 98. 1. HEAD AND NECK EXAM:  Extraocular movement of her eyes and pupillary light reflex are normal.  Her sclerae are not icteric. Open mouth full view of the uvula seen. There are no oral lesions. NECK:  Supple, without adenopathy. LUNGS:  Clear bilaterally. I hear no wheezing. HEART:  She has a regular heart rate with a systolic ejection murmur. ABDOMEN:  Shows a long midline incision ending in the widened umbilicus with a small protrusion at the umbilicus. She also has a depressed right lower quadrant transverse incision as well as a point incision that is healed in the left upper quadrant compatible with a feeding tube.       IMAGING:  The imagings studies she has had here include the ultrasound as I mentioned, the liver was enlarged at 19.6 cm and there was pericholecystic fluid, small thickening of the gallbladder up to 6 mm, common bile duct is 1 cm which apparently has been that way for a while. I mentioned, the CAT scan already that did show an inflamed pancreas as well as gallbladder wall thickening. LABORATORY STUDIES:  Significant for a white count normal at 8.8, hematocrit 43, MCV 79, platelet count 001,330. Glucose was elevated at 182 (I asked the hospitalist to obtain a hemoglobin A1c as several other glucoses in the past were elevated), BUN was 11, creatinine 0.86, calcium 9.5. AST elevated at 246, ALT elevated at 99, alkaline phosphatase of 198, albumin of 3.9. Lipase was 25,654. Urine was negative. ASSESSMENT AND PLAN:  This is a woman with gallstone pancreatitis and a dilated common bile duct with elevated liver function tests. I suspect she may well have passed a gallstone and may indeed have choledocholithiasis. This will be evaluated by Dr. Maulik Orellana and she may need a preoperative ERCP. A MRCP has been ordered. I explained to her the treatment of gallstone pancreatitis is to remove the gallbladder to prevent further recurrences. I have explained to her and her  this and have drawn a picture on the board in their room. I had told him the risks of going cholecystectomy, bleeding, infection, damage to organs in the right upper quadrant including common bile duct, post-cholecystectomy diarrhea, as well as conversion to an open procedure. She will be a difficult case given her past open surgery at [facility]. I am unclear what that operation was and I will ask the Records Department to try to obtain operation report from [facility].     Thank you for this consultation.       MD ANIRUDH Dao / TOÑA  D: 03/21/2018 07:45     T: 03/21/2018 09:39  JOB #: [id number]  CC: Aguilar Sewell MD  CC: [facility] DO

## 2018-03-21 NOTE — PROGRESS NOTES
SUBJECTIVE:    Nausea and vomiting better. Abdominal pain is improving. No chest pain or SOB. No cough. No headaches or dizziness. Passing flatus. No BM    OBJECTIVE:    /69 (BP 1 Location: Right arm, BP Patient Position: At rest)  Pulse (!) 104  Temp 98.8 °F (37.1 °C)  Resp 18  Ht 5' 4\" (1.626 m)  Wt 73.5 kg (162 lb 0.6 oz)  SpO2 95%  Breastfeeding? No  BMI 27.81 kg/m2    HEENT: No icterus. Oral mucosa is moist  Neck: trachea is midline  CVS: RRR  RS: CTA bilaterally  GI: ND, Soft, BS +   Extremities: no pedal edema  General: NAD, Awake  CNS: Moves all extremities    ASSESSMENT:    1. Abdominal pain and nausea, better. 2. Gallstone pancreatitis  3. Abnormal LFTs  4. Chronic intermittent mild asthma, no exacerbation  5.  Hypokalemia      PLAN:    Cont current management  CT and US abdomen report reviewed  Cont monitoring LFTs  GI and surgery input noted  MRCP pending     CMP:   Lab Results   Component Value Date/Time     03/21/2018 04:10 AM    K 3.4 (L) 03/21/2018 04:10 AM     03/21/2018 04:10 AM    CO2 26 03/21/2018 04:10 AM    AGAP 9 03/21/2018 04:10 AM     (H) 03/21/2018 04:10 AM    BUN 6 (L) 03/21/2018 04:10 AM    CREA 0.70 03/21/2018 04:10 AM    GFRAA >60 03/21/2018 04:10 AM    GFRNA >60 03/21/2018 04:10 AM    CA 8.7 03/21/2018 04:10 AM    MG 1.9 03/21/2018 04:10 AM    ALB 3.0 (L) 03/21/2018 04:10 AM    TP 7.3 03/21/2018 04:10 AM    GLOB 4.3 (H) 03/21/2018 04:10 AM    AGRAT 0.7 (L) 03/21/2018 04:10 AM    SGOT 190 (H) 03/21/2018 04:10 AM     (H) 03/21/2018 04:10 AM     CBC:   Lab Results   Component Value Date/Time    WBC 9.5 03/21/2018 04:10 AM    HGB 13.0 03/21/2018 04:10 AM    HCT 38.4 03/21/2018 04:10 AM     03/21/2018 04:10 AM

## 2018-03-22 ENCOUNTER — ANESTHESIA (OUTPATIENT)
Dept: ENDOSCOPY | Age: 53
DRG: 419 | End: 2018-03-22
Payer: OTHER GOVERNMENT

## 2018-03-22 ENCOUNTER — APPOINTMENT (OUTPATIENT)
Dept: GENERAL RADIOLOGY | Age: 53
DRG: 419 | End: 2018-03-22
Attending: INTERNAL MEDICINE
Payer: OTHER GOVERNMENT

## 2018-03-22 ENCOUNTER — ANESTHESIA EVENT (OUTPATIENT)
Dept: SURGERY | Age: 53
DRG: 419 | End: 2018-03-22
Payer: OTHER GOVERNMENT

## 2018-03-22 PROBLEM — K80.50 CHOLEDOCHOLITHIASIS: Status: ACTIVE | Noted: 2018-03-22

## 2018-03-22 LAB
ALBUMIN SERPL-MCNC: 2.7 G/DL (ref 3.4–5)
ALBUMIN/GLOB SERPL: 0.7 {RATIO} (ref 0.8–1.7)
ALP SERPL-CCNC: 130 U/L (ref 45–117)
ALT SERPL-CCNC: 86 U/L (ref 13–56)
ANION GAP SERPL CALC-SCNC: 10 MMOL/L (ref 3–18)
AST SERPL-CCNC: 73 U/L (ref 15–37)
BILIRUB SERPL-MCNC: 0.4 MG/DL (ref 0.2–1)
BUN SERPL-MCNC: 6 MG/DL (ref 7–18)
BUN/CREAT SERPL: 9 (ref 12–20)
CALCIUM SERPL-MCNC: 8.6 MG/DL (ref 8.5–10.1)
CHLORIDE SERPL-SCNC: 105 MMOL/L (ref 100–108)
CO2 SERPL-SCNC: 24 MMOL/L (ref 21–32)
CREAT SERPL-MCNC: 0.7 MG/DL (ref 0.6–1.3)
GLOBULIN SER CALC-MCNC: 4.1 G/DL (ref 2–4)
GLUCOSE SERPL-MCNC: 74 MG/DL (ref 74–99)
HCG UR QL: NEGATIVE
LIPASE SERPL-CCNC: 1220 U/L (ref 73–393)
MAGNESIUM SERPL-MCNC: 1.8 MG/DL (ref 1.6–2.6)
POTASSIUM SERPL-SCNC: 3.1 MMOL/L (ref 3.5–5.5)
PROT SERPL-MCNC: 6.8 G/DL (ref 6.4–8.2)
SODIUM SERPL-SCNC: 139 MMOL/L (ref 136–145)

## 2018-03-22 PROCEDURE — C1726 CATH, BAL DIL, NON-VASCULAR: HCPCS | Performed by: INTERNAL MEDICINE

## 2018-03-22 PROCEDURE — 77030012012 HC AIRWY OP SFT TELE -A: Performed by: NURSE ANESTHETIST, CERTIFIED REGISTERED

## 2018-03-22 PROCEDURE — 77030007288 HC DEV LOK BILI BSC -A: Performed by: INTERNAL MEDICINE

## 2018-03-22 PROCEDURE — 74011000250 HC RX REV CODE- 250: Performed by: INTERNAL MEDICINE

## 2018-03-22 PROCEDURE — 94640 AIRWAY INHALATION TREATMENT: CPT

## 2018-03-22 PROCEDURE — 74011000250 HC RX REV CODE- 250: Performed by: NURSE ANESTHETIST, CERTIFIED REGISTERED

## 2018-03-22 PROCEDURE — C9113 INJ PANTOPRAZOLE SODIUM, VIA: HCPCS | Performed by: INTERNAL MEDICINE

## 2018-03-22 PROCEDURE — 77030009038 HC CATH BILI STN RTVR BSC -C: Performed by: INTERNAL MEDICINE

## 2018-03-22 PROCEDURE — 74011250637 HC RX REV CODE- 250/637: Performed by: INTERNAL MEDICINE

## 2018-03-22 PROCEDURE — 74011250636 HC RX REV CODE- 250/636: Performed by: INTERNAL MEDICINE

## 2018-03-22 PROCEDURE — 74011250636 HC RX REV CODE- 250/636

## 2018-03-22 PROCEDURE — 74011636320 HC RX REV CODE- 636/320: Performed by: INTERNAL MEDICINE

## 2018-03-22 PROCEDURE — 83735 ASSAY OF MAGNESIUM: CPT | Performed by: INTERNAL MEDICINE

## 2018-03-22 PROCEDURE — 76060000032 HC ANESTHESIA 0.5 TO 1 HR: Performed by: INTERNAL MEDICINE

## 2018-03-22 PROCEDURE — 74328 X-RAY BILE DUCT ENDOSCOPY: CPT

## 2018-03-22 PROCEDURE — 83690 ASSAY OF LIPASE: CPT | Performed by: INTERNAL MEDICINE

## 2018-03-22 PROCEDURE — 80053 COMPREHEN METABOLIC PANEL: CPT | Performed by: INTERNAL MEDICINE

## 2018-03-22 PROCEDURE — 74011000250 HC RX REV CODE- 250

## 2018-03-22 PROCEDURE — 65270000029 HC RM PRIVATE

## 2018-03-22 PROCEDURE — 77030011640 HC PAD GRND REM COVD -A: Performed by: INTERNAL MEDICINE

## 2018-03-22 PROCEDURE — 77030012596 HC SPHNTOM BILI BSC -E: Performed by: INTERNAL MEDICINE

## 2018-03-22 PROCEDURE — 0FC98ZZ EXTIRPATION OF MATTER FROM COMMON BILE DUCT, VIA NATURAL OR ARTIFICIAL OPENING ENDOSCOPIC: ICD-10-PCS | Performed by: INTERNAL MEDICINE

## 2018-03-22 PROCEDURE — 76040000007: Performed by: INTERNAL MEDICINE

## 2018-03-22 PROCEDURE — 74011250636 HC RX REV CODE- 250/636: Performed by: NURSE ANESTHETIST, CERTIFIED REGISTERED

## 2018-03-22 PROCEDURE — 77030026438 HC STYL ET INTUB CARD -A: Performed by: NURSE ANESTHETIST, CERTIFIED REGISTERED

## 2018-03-22 PROCEDURE — 74011000258 HC RX REV CODE- 258: Performed by: INTERNAL MEDICINE

## 2018-03-22 PROCEDURE — 36415 COLL VENOUS BLD VENIPUNCTURE: CPT | Performed by: INTERNAL MEDICINE

## 2018-03-22 PROCEDURE — 77030008683 HC TU ET CUF COVD -A: Performed by: NURSE ANESTHETIST, CERTIFIED REGISTERED

## 2018-03-22 PROCEDURE — 94664 DEMO&/EVAL PT USE INHALER: CPT

## 2018-03-22 PROCEDURE — 77030007290 HC DEV RTVR RTH STRC -G: Performed by: INTERNAL MEDICINE

## 2018-03-22 PROCEDURE — 81025 URINE PREGNANCY TEST: CPT

## 2018-03-22 PROCEDURE — 77030018712 HC DEV BLLN INFL BSC -B: Performed by: INTERNAL MEDICINE

## 2018-03-22 PROCEDURE — 77030018846 HC SOL IRR STRL H20 ICUM -A: Performed by: INTERNAL MEDICINE

## 2018-03-22 PROCEDURE — 77030009426 HC FCPS BIOP ENDOSC BSC -B: Performed by: INTERNAL MEDICINE

## 2018-03-22 RX ORDER — INSULIN LISPRO 100 [IU]/ML
INJECTION, SOLUTION INTRAVENOUS; SUBCUTANEOUS ONCE
Status: DISCONTINUED | OUTPATIENT
Start: 2018-03-22 | End: 2018-03-22 | Stop reason: HOSPADM

## 2018-03-22 RX ORDER — FAMOTIDINE 10 MG/ML
20 INJECTION INTRAVENOUS ONCE
Status: COMPLETED | OUTPATIENT
Start: 2018-03-22 | End: 2018-03-22

## 2018-03-22 RX ORDER — MIDAZOLAM HYDROCHLORIDE 1 MG/ML
INJECTION, SOLUTION INTRAMUSCULAR; INTRAVENOUS AS NEEDED
Status: DISCONTINUED | OUTPATIENT
Start: 2018-03-22 | End: 2018-03-22 | Stop reason: HOSPADM

## 2018-03-22 RX ORDER — ONDANSETRON 2 MG/ML
INJECTION INTRAMUSCULAR; INTRAVENOUS AS NEEDED
Status: DISCONTINUED | OUTPATIENT
Start: 2018-03-22 | End: 2018-03-22 | Stop reason: HOSPADM

## 2018-03-22 RX ORDER — ACETAMINOPHEN 325 MG/1
325 TABLET ORAL
Status: DISCONTINUED | OUTPATIENT
Start: 2018-03-22 | End: 2018-03-23

## 2018-03-22 RX ORDER — SODIUM CHLORIDE 0.9 % (FLUSH) 0.9 %
5-10 SYRINGE (ML) INJECTION AS NEEDED
Status: DISCONTINUED | OUTPATIENT
Start: 2018-03-22 | End: 2018-03-22 | Stop reason: HOSPADM

## 2018-03-22 RX ORDER — MORPHINE SULFATE 8 MG/ML
2 INJECTION, SOLUTION INTRAMUSCULAR; INTRAVENOUS
Status: DISCONTINUED | OUTPATIENT
Start: 2018-03-22 | End: 2018-03-23

## 2018-03-22 RX ORDER — DEXTROSE 50 % IN WATER (D50W) INTRAVENOUS SYRINGE
25-50 AS NEEDED
Status: DISCONTINUED | OUTPATIENT
Start: 2018-03-22 | End: 2018-03-22 | Stop reason: HOSPADM

## 2018-03-22 RX ORDER — ONDANSETRON 2 MG/ML
4 INJECTION INTRAMUSCULAR; INTRAVENOUS ONCE
Status: DISCONTINUED | OUTPATIENT
Start: 2018-03-22 | End: 2018-03-22 | Stop reason: HOSPADM

## 2018-03-22 RX ORDER — SUCCINYLCHOLINE CHLORIDE 20 MG/ML
INJECTION INTRAMUSCULAR; INTRAVENOUS AS NEEDED
Status: DISCONTINUED | OUTPATIENT
Start: 2018-03-22 | End: 2018-03-22 | Stop reason: HOSPADM

## 2018-03-22 RX ORDER — BUDESONIDE AND FORMOTEROL FUMARATE DIHYDRATE 80; 4.5 UG/1; UG/1
2 AEROSOL RESPIRATORY (INHALATION)
Status: DISCONTINUED | OUTPATIENT
Start: 2018-03-22 | End: 2018-03-24 | Stop reason: HOSPADM

## 2018-03-22 RX ORDER — POTASSIUM CHLORIDE 20 MEQ/1
40 TABLET, EXTENDED RELEASE ORAL EVERY 4 HOURS
Status: DISCONTINUED | OUTPATIENT
Start: 2018-03-22 | End: 2018-03-22

## 2018-03-22 RX ORDER — FENTANYL CITRATE 50 UG/ML
50 INJECTION, SOLUTION INTRAMUSCULAR; INTRAVENOUS
Status: DISCONTINUED | OUTPATIENT
Start: 2018-03-22 | End: 2018-03-22 | Stop reason: HOSPADM

## 2018-03-22 RX ORDER — MAGNESIUM SULFATE 100 %
4 CRYSTALS MISCELLANEOUS AS NEEDED
Status: DISCONTINUED | OUTPATIENT
Start: 2018-03-22 | End: 2018-03-22 | Stop reason: HOSPADM

## 2018-03-22 RX ORDER — SODIUM CHLORIDE, SODIUM LACTATE, POTASSIUM CHLORIDE, CALCIUM CHLORIDE 600; 310; 30; 20 MG/100ML; MG/100ML; MG/100ML; MG/100ML
75 INJECTION, SOLUTION INTRAVENOUS CONTINUOUS
Status: DISCONTINUED | OUTPATIENT
Start: 2018-03-22 | End: 2018-03-22 | Stop reason: HOSPADM

## 2018-03-22 RX ORDER — SODIUM CHLORIDE 0.9 % (FLUSH) 0.9 %
5-10 SYRINGE (ML) INJECTION EVERY 8 HOURS
Status: DISCONTINUED | OUTPATIENT
Start: 2018-03-22 | End: 2018-03-22 | Stop reason: HOSPADM

## 2018-03-22 RX ORDER — GLYCOPYRROLATE 0.2 MG/ML
INJECTION INTRAMUSCULAR; INTRAVENOUS AS NEEDED
Status: DISCONTINUED | OUTPATIENT
Start: 2018-03-22 | End: 2018-03-22 | Stop reason: HOSPADM

## 2018-03-22 RX ORDER — PROPOFOL 10 MG/ML
INJECTION, EMULSION INTRAVENOUS AS NEEDED
Status: DISCONTINUED | OUTPATIENT
Start: 2018-03-22 | End: 2018-03-22 | Stop reason: HOSPADM

## 2018-03-22 RX ORDER — FENTANYL CITRATE 50 UG/ML
INJECTION, SOLUTION INTRAMUSCULAR; INTRAVENOUS AS NEEDED
Status: DISCONTINUED | OUTPATIENT
Start: 2018-03-22 | End: 2018-03-22 | Stop reason: HOSPADM

## 2018-03-22 RX ORDER — ALBUTEROL SULFATE 90 UG/1
2 AEROSOL, METERED RESPIRATORY (INHALATION)
Status: DISCONTINUED | OUTPATIENT
Start: 2018-03-22 | End: 2018-03-22 | Stop reason: CLARIF

## 2018-03-22 RX ORDER — LIDOCAINE HYDROCHLORIDE 20 MG/ML
INJECTION, SOLUTION EPIDURAL; INFILTRATION; INTRACAUDAL; PERINEURAL AS NEEDED
Status: DISCONTINUED | OUTPATIENT
Start: 2018-03-22 | End: 2018-03-22 | Stop reason: HOSPADM

## 2018-03-22 RX ADMIN — GLYCOPYRROLATE 0.2 MG: 0.2 INJECTION INTRAMUSCULAR; INTRAVENOUS at 08:02

## 2018-03-22 RX ADMIN — FAMOTIDINE 20 MG: 10 INJECTION INTRAVENOUS at 07:49

## 2018-03-22 RX ADMIN — METRONIDAZOLE 250 MG: 500 INJECTION, SOLUTION INTRAVENOUS at 12:02

## 2018-03-22 RX ADMIN — SUCCINYLCHOLINE CHLORIDE 120 MG: 20 INJECTION INTRAMUSCULAR; INTRAVENOUS at 08:09

## 2018-03-22 RX ADMIN — ALBUTEROL SULFATE 2.5 MG: 2.5 SOLUTION RESPIRATORY (INHALATION) at 17:00

## 2018-03-22 RX ADMIN — PROPOFOL 200 MG: 10 INJECTION, EMULSION INTRAVENOUS at 08:08

## 2018-03-22 RX ADMIN — ONDANSETRON HYDROCHLORIDE 4 MG: 2 INJECTION, SOLUTION INTRAMUSCULAR; INTRAVENOUS at 02:31

## 2018-03-22 RX ADMIN — BUDESONIDE AND FORMOTEROL FUMARATE DIHYDRATE 2 PUFF: 80; 4.5 AEROSOL RESPIRATORY (INHALATION) at 20:00

## 2018-03-22 RX ADMIN — PANTOPRAZOLE SODIUM 40 MG: 40 INJECTION, POWDER, FOR SOLUTION INTRAVENOUS at 11:53

## 2018-03-22 RX ADMIN — METRONIDAZOLE 250 MG: 500 INJECTION, SOLUTION INTRAVENOUS at 02:24

## 2018-03-22 RX ADMIN — POTASSIUM CHLORIDE: 2 INJECTION, SOLUTION, CONCENTRATE INTRAVENOUS at 23:12

## 2018-03-22 RX ADMIN — ONDANSETRON HYDROCHLORIDE 4 MG: 2 INJECTION, SOLUTION INTRAMUSCULAR; INTRAVENOUS at 16:10

## 2018-03-22 RX ADMIN — MIDAZOLAM HYDROCHLORIDE 2 MG: 1 INJECTION, SOLUTION INTRAMUSCULAR; INTRAVENOUS at 08:02

## 2018-03-22 RX ADMIN — ACETAMINOPHEN 325 MG: 325 TABLET ORAL at 20:21

## 2018-03-22 RX ADMIN — POTASSIUM CHLORIDE: 2 INJECTION, SOLUTION, CONCENTRATE INTRAVENOUS at 23:00

## 2018-03-22 RX ADMIN — ONDANSETRON HYDROCHLORIDE 4 MG: 2 INJECTION, SOLUTION INTRAMUSCULAR; INTRAVENOUS at 11:53

## 2018-03-22 RX ADMIN — SODIUM CHLORIDE, SODIUM LACTATE, POTASSIUM CHLORIDE, AND CALCIUM CHLORIDE 75 ML/HR: 600; 310; 30; 20 INJECTION, SOLUTION INTRAVENOUS at 07:49

## 2018-03-22 RX ADMIN — CIPROFLOXACIN 400 MG: 2 INJECTION, SOLUTION INTRAVENOUS at 05:54

## 2018-03-22 RX ADMIN — MORPHINE SULFATE 4 MG: 8 INJECTION, SOLUTION INTRAMUSCULAR; INTRAVENOUS at 06:18

## 2018-03-22 RX ADMIN — FENTANYL CITRATE 100 MCG: 50 INJECTION, SOLUTION INTRAMUSCULAR; INTRAVENOUS at 08:08

## 2018-03-22 RX ADMIN — ONDANSETRON HYDROCHLORIDE 4 MG: 2 INJECTION, SOLUTION INTRAMUSCULAR; INTRAVENOUS at 19:49

## 2018-03-22 RX ADMIN — METRONIDAZOLE 250 MG: 500 INJECTION, SOLUTION INTRAVENOUS at 06:17

## 2018-03-22 RX ADMIN — LIDOCAINE HYDROCHLORIDE 60 MG: 20 INJECTION, SOLUTION EPIDURAL; INFILTRATION; INTRACAUDAL; PERINEURAL at 08:08

## 2018-03-22 RX ADMIN — ONDANSETRON 4 MG: 2 INJECTION INTRAMUSCULAR; INTRAVENOUS at 08:32

## 2018-03-22 RX ADMIN — PANTOPRAZOLE SODIUM 40 MG: 40 INJECTION, POWDER, FOR SOLUTION INTRAVENOUS at 02:24

## 2018-03-22 RX ADMIN — MORPHINE SULFATE 2 MG: 8 INJECTION, SOLUTION INTRAMUSCULAR; INTRAVENOUS at 22:19

## 2018-03-22 RX ADMIN — ALBUTEROL SULFATE 2.5 MG: 2.5 SOLUTION RESPIRATORY (INHALATION) at 20:00

## 2018-03-22 RX ADMIN — METRONIDAZOLE 250 MG: 500 INJECTION, SOLUTION INTRAVENOUS at 20:03

## 2018-03-22 RX ADMIN — POTASSIUM CHLORIDE: 2 INJECTION, SOLUTION, CONCENTRATE INTRAVENOUS at 22:19

## 2018-03-22 RX ADMIN — CIPROFLOXACIN 400 MG: 2 INJECTION, SOLUTION INTRAVENOUS at 17:50

## 2018-03-22 NOTE — ROUTINE PROCESS
Bedside shift change report given to Frandy Foster (oncoming nurse) by Kymberly Haley LPN (offgoing nurse). Report included the following information SBAR, Kardex, Intake/Output, MAR, Recent Results and Med Rec Status.

## 2018-03-22 NOTE — ANESTHESIA PREPROCEDURE EVALUATION
Anesthetic History   No history of anesthetic complications            Review of Systems / Medical History  Patient summary reviewed and pertinent labs reviewed    Pulmonary  Within defined limits          Asthma        Neuro/Psych   Within defined limits           Cardiovascular    Hypertension              Exercise tolerance: >4 METS     GI/Hepatic/Renal     GERD           Endo/Other        Obesity     Other Findings   Comments: Documentation of current medication  Current medications obtained, documented and obtained? YES      Risk Factors for Postoperative nausea/vomiting:       History of postoperative nausea/vomiting? NO       Female? YES       Motion sickness? NO       Intended opioid administration for postoperative analgesia? NO      Smoking Abstinence:  Current Smoker? NO  Elective Surgery? YES  Seen preoperatively by anesthesiologist or proxy prior to day of surgery? YES  Pt abstained from smoking 24 hours prior to anesthesia?  N/A    Preventive care/screening for High Blood Pressure:  Aged 18 years and older: YES  Screened for high blood pressure: YES  Patients with high blood pressure referred to primary care provider   for BP management: YES                 Physical Exam    Airway  Mallampati: II  TM Distance: 4 - 6 cm  Neck ROM: normal range of motion   Mouth opening: Normal     Cardiovascular  Regular rate and rhythm,  S1 and S2 normal,  no murmur, click, rub, or gallop  Rhythm: regular  Rate: normal         Dental    Dentition: Lower dentition intact and Upper dentition intact     Pulmonary  Breath sounds clear to auscultation               Abdominal  GI exam deferred       Other Findings            Anesthetic Plan    ASA: 3  Anesthesia type: general          Induction: Intravenous  Anesthetic plan and risks discussed with: Patient

## 2018-03-22 NOTE — PROCEDURES
ERCP dictated. Small stone fragments/sludge removed w 12mm balloon after small BES. PD not injected. OK to feed today, NPO after mid for odalys tomorrow.     Rosendo Shirley MD

## 2018-03-22 NOTE — PROGRESS NOTES
MRCP reviewed w radiologist, small suspected filling defect in distal CBD, stones in GB. Will proceed w pre-op ERCP. Informed Consent: The risks, benefits and alternatives of the procedure and the sedation options were discussed in detail. The patient is aware of potential complications including but not limited to bleeding, perforation, aspiration, infection, pancreatitis, sedation adverse events, missed diagnosis, missed lesions, intravenous site complications, potential need for additional procedures, and death. All questions were answered. Informed consent is documented on medical record.

## 2018-03-22 NOTE — PERIOP NOTES
TRANSFER - OUT REPORT:    Verbal report given to Carol CAMPA(name[facility]  [facility] to [address] [place] [address](unit) for routine post - op       Report consisted of patients Situation, Background, Assessment and   Recommendations(SBAR). Information from the following report(s) SBAR, Procedure Summary, Intake/Output and MAR was reviewed with the receiving nurse. Lines:   Peripheral IV 03/21/18 Right Forearm (Active)   Site Assessment Clean, dry, & intact 3/22/2018  7:20 AM   Phlebitis Assessment 0 3/22/2018  7:20 AM   Infiltration Assessment 0 3/22/2018  7:20 AM   Dressing Status Clean, dry, & intact 3/22/2018  7:20 AM   Dressing Type Transparent 3/22/2018  7:20 AM   Hub Color/Line Status Blue 3/22/2018  7:20 AM   Alcohol Cap Used Yes 3/22/2018  7:20 AM       Peripheral IV 03/22/18 Left Hand (Active)   Site Assessment Clean, dry, & intact 3/22/2018  8:02 AM   Phlebitis Assessment 0 3/22/2018  8:02 AM   Infiltration Assessment 0 3/22/2018  8:02 AM   Dressing Status New 3/22/2018  8:02 AM   Dressing Type Tape;Transparent 3/22/2018  8:02 AM   Hub Color/Line Status Pink; Infusing;Flushed 3/22/2018  8:02 AM   Alcohol Cap Used Yes 3/22/2018  8:02 AM        Opportunity for questions and clarification was provided.       Patient transported with:   [employer]

## 2018-03-22 NOTE — PROGRESS NOTES
SUBJECTIVE:    Epigastric pain and nausea present. No chest pain. No SOB or cough. OBJECTIVE:    BP (!) 163/98 (BP 1 Location: Right arm, BP Patient Position: Sitting)  Pulse 68  Temp 98.6 °F (37 °C)  Resp 20  Ht 5' 4\" (1.626 m)  Wt 73.5 kg (162 lb 0.6 oz)  SpO2 96%  Breastfeeding? No  BMI 27.81 kg/m2    CVS: RRR  RS: CTA bilaterally  GI: ND, Soft, BS +   Extremities: no pedal edema  General: NAD, Awake  CNS: Moves all extremities    ASSESSMENT:    1. Abdominal pain and nausea, better. 2. Gallstone pancreatitis s/p ERCP and removal of choledocholithiasis   3. Abnormal LFTs, improving   4. Chronic intermittent mild asthma, no exacerbation  5.  Hypokalemia      PLAN:    Cont current management  Cholecystectomy tomorrow morning   Change to SCD  Replete K     CMP:   Lab Results   Component Value Date/Time     03/22/2018 03:57 AM    K 3.1 (L) 03/22/2018 03:57 AM     03/22/2018 03:57 AM    CO2 24 03/22/2018 03:57 AM    AGAP 10 03/22/2018 03:57 AM    GLU 74 03/22/2018 03:57 AM    BUN 6 (L) 03/22/2018 03:57 AM    CREA 0.70 03/22/2018 03:57 AM    GFRAA >60 03/22/2018 03:57 AM    GFRNA >60 03/22/2018 03:57 AM    CA 8.6 03/22/2018 03:57 AM    MG 1.8 03/22/2018 03:57 AM    ALB 2.7 (L) 03/22/2018 03:57 AM    TP 6.8 03/22/2018 03:57 AM    GLOB 4.1 (H) 03/22/2018 03:57 AM    AGRAT 0.7 (L) 03/22/2018 03:57 AM    SGOT 73 (H) 03/22/2018 03:57 AM    ALT 86 (H) 03/22/2018 03:57 AM     CBC:   No results found for: WBC, HGB, HGBEXT, HCT, HCTEXT, PLT, PLTEXT, HGBEXT, HCTEXT, PLTEXT

## 2018-03-22 NOTE — ROUTINE PROCESS
Bedside and Verbal shift change report given to Rudolph MAY RN (oncoming nurse) by Mariano Miranda (offgoing nurse). Report included the following information SBAR, Kardex and Recent Results.

## 2018-03-22 NOTE — ANESTHESIA POSTPROCEDURE EVALUATION
Post-Anesthesia Evaluation and Assessment    Patient: Kristie Casiano MRN: [id number]  SSN: [id number]    YOB: 1965  Age: 47 y.o. Sex: female     VS from flow sheet    Cardiovascular Function/Vital Signs  Visit Vitals    /83    Pulse (!) 109    Temp 36.5 °C (97.7 °F)    Resp 23    Ht 5' 4\" (1.626 m)    Wt 73.5 kg (162 lb 0.6 oz)    SpO2 100%    Breastfeeding No    BMI 27.81 kg/m2       Patient is status post general anesthesia for Procedure(s):  ENDOSCOPIC RETROGRADE CHOLANGIOPANCREATOGRAPHY WITH CHOLANGIOSCOPY, Sphicterotomy. Nausea/Vomiting: None    Postoperative hydration reviewed and adequate. Pain:  Pain Scale 1: Numeric (0 - 10) (03/22/18 0843)  Pain Intensity 1: 0 (03/22/18 0843)   Managed    Neurological Status: At baseline    Mental Status and Level of Consciousness: Arousable    Pulmonary Status:   O2 Device: Room air (03/22/18 0853)   Adequate oxygenation and airway patent    Complications related to anesthesia: None    Post-anesthesia assessment completed.  No concerns    Signed By: Amelia Mortensen MD     March 22, 2018

## 2018-03-22 NOTE — PROGRESS NOTES
Progress Note    Patient: Heather Copeland MRN: [id number]  SSN: [id number]    YOB: 1965  Age: 47 y.o. Sex: female      Admit Date: 3/20/2018    LOS: 2 days     Subjective:     C/o headache, little abdominal pain, much better than on admission    Objective:     Vitals:    03/21/18 1627 03/21/18 2000 03/22/18 0000 03/22/18 0400   BP: 107/69 125/84 105/68 136/82   Pulse: (!) 104 97 (!) 101 88   Resp: 18 20 19 19   Temp: 98.8 °F (37.1 °C) 98.9 °F (37.2 °C) 98.2 °F (36.8 °C) 98.5 °F (36.9 °C)   SpO2: 95% 95% 96% 96%   Weight:       Height:            Intake and Output:  Current Shift: 03/21 1901 - 03/22 0700  In: -   Out: 400 [Urine:400]  Last three shifts: 03/20 0701 - 03/21 1900  In: 892.5 [I.V.:892.5]  Out: 1650 [Urine:1650]    Physical Exam:   Looks well  No jaundice  Abdomen is soft and non tender in RUQ    Lab/Data Review:  Recent Results (from the past 12 hour(s))   METABOLIC PANEL, COMPREHENSIVE    Collection Time: 03/22/18  3:57 AM   Result Value Ref Range    Sodium 139 136 - 145 mmol/L    Potassium 3.1 (L) 3.5 - 5.5 mmol/L    Chloride 105 100 - 108 mmol/L    CO2 24 21 - 32 mmol/L    Anion gap 10 3.0 - 18 mmol/L    Glucose 74 74 - 99 mg/dL    BUN 6 (L) 7.0 - 18 MG/DL    Creatinine 0.70 0.6 - 1.3 MG/DL    BUN/Creatinine ratio 9 (L) 12 - 20      GFR est AA >60 >60 ml/min/1.73m2    GFR est non-AA >60 >60 ml/min/1.73m2    Calcium 8.6 8.5 - 10.1 MG/DL    Bilirubin, total 0.4 0.2 - 1.0 MG/DL    ALT (SGPT) 86 (H) 13 - 56 U/L    AST (SGOT) 73 (H) 15 - 37 U/L    Alk.  phosphatase 130 (H) 45 - 117 U/L    Protein, total 6.8 6.4 - 8.2 g/dL    Albumin 2.7 (L) 3.4 - 5.0 g/dL    Globulin 4.1 (H) 2.0 - 4.0 g/dL    A-G Ratio 0.7 (L) 0.8 - 1.7     MAGNESIUM    Collection Time: 03/22/18  3:57 AM   Result Value Ref Range    Magnesium 1.8 1.6 - 2.6 mg/dL   LIPASE    Collection Time: 03/22/18  3:57 AM   Result Value Ref Range    Lipase 1220 (H) 73 - 393 U/L      MRCP done last evening, no report of results in system    Assessment:     Principal Problem:    Gallstone pancreatitis (3/20/2018)    Active Problems:    Hypokalemia (2/1/2014)      Dehydration (3/20/2018)      Acute gallstone pancreatitis (3/20/2018)    hypokalemia    Plan:   Dr. Cristela Majano planning on doing EGD/ERC this AM  Laparoscopic cholecystectomy scheduled for 1430 tomorrow  NPO after midnight tonight  Consent on chart      Signed By: Devendra Harris MD     March 22, 2018

## 2018-03-22 NOTE — ROUTINE PROCESS
Bedside and Verbal shift change report given to PINA Shah (oncoming nurse) by Mariano Miranda (offgoing nurse). Report included the following information SBAR, Kardex and Recent Results.    \

## 2018-03-22 NOTE — OP NOTES
[address] [place] [address]   OPERATIVE REPORT    Aaliyah Corona  MR#: [id number]  : 1965  ACCOUNT #: [de-identified]   DATE OF SERVICE: 2018    PROCEDURE:  ERCP. PREOPERATIVE DIAGNOSIS:  Suspected common bile duct stones. POSTOPERATIVE DIAGNOSES:  Choledocholithiasis and biliary sludge. :  Brina Grimm MD    SCOPE:  Olympus duodenoscope. SEDATION:  General anesthesia. SPECIMENS:  None. IMPLANTS:  None. ASSISTANT:  None. COMPLICATIONS:  None. BLOOD LOSS:  None. OPERATIVE REPORT:  Informed consent was obtained. Risks, benefits, alternatives were discussed with the patient and her family in the preoperative holding area, outlined to include, but not be limited to bleeding, perforation, anesthesia reaction, pancreatitis, inability to gain access to the common bile duct requiring additional procedures or even death. She and her family understood and agreed. She was then brought to the endoscopy suite and placed in the prone position. The scope was advanced through the oropharynx, advanced to the level of the ampulla. The ampulla appeared grossly normal.  A sphincterotome was used to selectively cannulate the common bile duct. A guidewire was inserted into the right intrahepatic ductal system. Then, 50% contrast material was used to opacify the duct. There were no obvious filling defects noted, but there was abrupt cut off of the duct at the distal aspect. At this point, a small biliary sphincterotomy was made which allowed free flow of bile. A 9-12 mm balloon was inserted into the common hepatic duct and swept through the duct. The 12 mm balloon did pass through the sphincterotomy. Small stone fragments and biliary sludge were retrieved. There was no pus or evidence of cholangitis. The intrahepatic ducts appeared grossly normal.  Common bile duct and common hepatic duct measured 9-10 mm. Pancreatic duct was not injected.   The patient tolerated the procedure well without acute complications. RADIOGRAPHIC INTERPRETATION:  Total fluoroscopy time 3 minutes and 1 second. Impression as noted above. Outcome positive. IMPRESSION:  Mildly dilated common bile duct with choledocholithiasis/biliary sludge. PLAN:  May eat today, low fat diet as tolerated. N.p.o. after midnight for cholecystectomy tomorrow. Preoperative antibiotics were provided, Cipro. Postoperative indomethacin suppository given as well.       MD FELICIA Robles / DONAVAN  D: 03/22/2018 08:40     T: 03/22/2018 09:18  JOB #: [id number]  CC: Jordan Bustamante MD  CC: 1 Bloxy

## 2018-03-23 ENCOUNTER — ANESTHESIA (OUTPATIENT)
Dept: SURGERY | Age: 53
DRG: 419 | End: 2018-03-23
Payer: OTHER GOVERNMENT

## 2018-03-23 LAB
ALBUMIN SERPL-MCNC: 2.9 G/DL (ref 3.4–5)
ALBUMIN/GLOB SERPL: 0.7 {RATIO} (ref 0.8–1.7)
ALP SERPL-CCNC: 138 U/L (ref 45–117)
ALT SERPL-CCNC: 67 U/L (ref 13–56)
ANION GAP SERPL CALC-SCNC: 8 MMOL/L (ref 3–18)
AST SERPL-CCNC: 50 U/L (ref 15–37)
BILIRUB SERPL-MCNC: 0.4 MG/DL (ref 0.2–1)
BUN SERPL-MCNC: 3 MG/DL (ref 7–18)
BUN/CREAT SERPL: 4 (ref 12–20)
CALCIUM SERPL-MCNC: 8.7 MG/DL (ref 8.5–10.1)
CHLORIDE SERPL-SCNC: 108 MMOL/L (ref 100–108)
CO2 SERPL-SCNC: 26 MMOL/L (ref 21–32)
CREAT SERPL-MCNC: 0.74 MG/DL (ref 0.6–1.3)
GLOBULIN SER CALC-MCNC: 4 G/DL (ref 2–4)
GLUCOSE SERPL-MCNC: 93 MG/DL (ref 74–99)
HCG UR QL: NEGATIVE
HCG UR QL: NEGATIVE
MAGNESIUM SERPL-MCNC: 1.8 MG/DL (ref 1.6–2.6)
POTASSIUM SERPL-SCNC: 3.2 MMOL/L (ref 3.5–5.5)
PROT SERPL-MCNC: 6.9 G/DL (ref 6.4–8.2)
SODIUM SERPL-SCNC: 142 MMOL/L (ref 136–145)

## 2018-03-23 PROCEDURE — 74011000250 HC RX REV CODE- 250: Performed by: INTERNAL MEDICINE

## 2018-03-23 PROCEDURE — 36415 COLL VENOUS BLD VENIPUNCTURE: CPT | Performed by: INTERNAL MEDICINE

## 2018-03-23 PROCEDURE — 76210000017 HC OR PH I REC 1.5 TO 2 HR: Performed by: SURGERY

## 2018-03-23 PROCEDURE — 83735 ASSAY OF MAGNESIUM: CPT | Performed by: INTERNAL MEDICINE

## 2018-03-23 PROCEDURE — 76060000034 HC ANESTHESIA 1.5 TO 2 HR: Performed by: SURGERY

## 2018-03-23 PROCEDURE — 77030003580 HC NDL INSUF VERES J&J -B: Performed by: SURGERY

## 2018-03-23 PROCEDURE — 77030002967 HC SUT PDS J&J -B: Performed by: SURGERY

## 2018-03-23 PROCEDURE — 77030003028 HC SUT VCRL J&J -A: Performed by: SURGERY

## 2018-03-23 PROCEDURE — 77030011640 HC PAD GRND REM COVD -A: Performed by: SURGERY

## 2018-03-23 PROCEDURE — 77030008683 HC TU ET CUF COVD -A: Performed by: ANESTHESIOLOGY

## 2018-03-23 PROCEDURE — 74011000250 HC RX REV CODE- 250

## 2018-03-23 PROCEDURE — 0WQF4ZZ REPAIR ABDOMINAL WALL, PERCUTANEOUS ENDOSCOPIC APPROACH: ICD-10-PCS | Performed by: SURGERY

## 2018-03-23 PROCEDURE — 74011250636 HC RX REV CODE- 250/636: Performed by: NURSE ANESTHETIST, CERTIFIED REGISTERED

## 2018-03-23 PROCEDURE — 77030008771 HC TU NG SALEM SUMP -A: Performed by: ANESTHESIOLOGY

## 2018-03-23 PROCEDURE — 77030002933 HC SUT MCRYL J&J -A: Performed by: SURGERY

## 2018-03-23 PROCEDURE — 74011250636 HC RX REV CODE- 250/636: Performed by: EMERGENCY MEDICINE

## 2018-03-23 PROCEDURE — 74011250636 HC RX REV CODE- 250/636

## 2018-03-23 PROCEDURE — 76010000153 HC OR TIME 1.5 TO 2 HR: Performed by: SURGERY

## 2018-03-23 PROCEDURE — 0FT44ZZ RESECTION OF GALLBLADDER, PERCUTANEOUS ENDOSCOPIC APPROACH: ICD-10-PCS | Performed by: SURGERY

## 2018-03-23 PROCEDURE — 77030037892: Performed by: SURGERY

## 2018-03-23 PROCEDURE — 77030035048 HC TRCR ENDOSC OPTCL COVD -B: Performed by: SURGERY

## 2018-03-23 PROCEDURE — 77030020782 HC GWN BAIR PAWS FLX 3M -B: Performed by: SURGERY

## 2018-03-23 PROCEDURE — 77030035045 HC TRCR ENDOSC VRSPRT BLDLSS COVD -B: Performed by: SURGERY

## 2018-03-23 PROCEDURE — 74011000258 HC RX REV CODE- 258

## 2018-03-23 PROCEDURE — 77030010513 HC APPL CLP LIG J&J -C: Performed by: SURGERY

## 2018-03-23 PROCEDURE — 94640 AIRWAY INHALATION TREATMENT: CPT

## 2018-03-23 PROCEDURE — 77030020255 HC SOL INJ LR 1000ML BG: Performed by: SURGERY

## 2018-03-23 PROCEDURE — 88304 TISSUE EXAM BY PATHOLOGIST: CPT | Performed by: SURGERY

## 2018-03-23 PROCEDURE — 74011250637 HC RX REV CODE- 250/637: Performed by: NURSE ANESTHETIST, CERTIFIED REGISTERED

## 2018-03-23 PROCEDURE — 81025 URINE PREGNANCY TEST: CPT

## 2018-03-23 PROCEDURE — 74011250636 HC RX REV CODE- 250/636: Performed by: INTERNAL MEDICINE

## 2018-03-23 PROCEDURE — 65270000029 HC RM PRIVATE

## 2018-03-23 PROCEDURE — 77030008544 HC TBNG MON PRSS MRTM -B: Performed by: ANESTHESIOLOGY

## 2018-03-23 PROCEDURE — 80053 COMPREHEN METABOLIC PANEL: CPT | Performed by: INTERNAL MEDICINE

## 2018-03-23 PROCEDURE — 77030026438 HC STYL ET INTUB CARD -A: Performed by: ANESTHESIOLOGY

## 2018-03-23 PROCEDURE — C9290 INJ, BUPIVACAINE LIPOSOME: HCPCS | Performed by: SURGERY

## 2018-03-23 PROCEDURE — 77030010507 HC ADH SKN DERMBND J&J -B: Performed by: SURGERY

## 2018-03-23 PROCEDURE — 74011000258 HC RX REV CODE- 258: Performed by: INTERNAL MEDICINE

## 2018-03-23 PROCEDURE — 74011250636 HC RX REV CODE- 250/636: Performed by: SURGERY

## 2018-03-23 PROCEDURE — C9113 INJ PANTOPRAZOLE SODIUM, VIA: HCPCS | Performed by: INTERNAL MEDICINE

## 2018-03-23 RX ORDER — ONDANSETRON 2 MG/ML
INJECTION INTRAMUSCULAR; INTRAVENOUS AS NEEDED
Status: DISCONTINUED | OUTPATIENT
Start: 2018-03-23 | End: 2018-03-23 | Stop reason: HOSPADM

## 2018-03-23 RX ORDER — MORPHINE SULFATE 4 MG/ML
4 INJECTION, SOLUTION INTRAMUSCULAR; INTRAVENOUS
Status: DISCONTINUED | OUTPATIENT
Start: 2018-03-23 | End: 2018-03-24

## 2018-03-23 RX ORDER — MORPHINE SULFATE 2 MG/ML
2 INJECTION, SOLUTION INTRAMUSCULAR; INTRAVENOUS AS NEEDED
Status: DISCONTINUED | OUTPATIENT
Start: 2018-03-23 | End: 2018-03-23 | Stop reason: HOSPADM

## 2018-03-23 RX ORDER — ACETAMINOPHEN 325 MG/1
650 TABLET ORAL
Status: DISCONTINUED | OUTPATIENT
Start: 2018-03-23 | End: 2018-03-24 | Stop reason: HOSPADM

## 2018-03-23 RX ORDER — METOPROLOL TARTRATE 5 MG/5ML
1.25 INJECTION INTRAVENOUS
Status: DISCONTINUED | OUTPATIENT
Start: 2018-03-23 | End: 2018-03-24 | Stop reason: HOSPADM

## 2018-03-23 RX ORDER — FENTANYL CITRATE 50 UG/ML
INJECTION, SOLUTION INTRAMUSCULAR; INTRAVENOUS AS NEEDED
Status: DISCONTINUED | OUTPATIENT
Start: 2018-03-23 | End: 2018-03-23 | Stop reason: HOSPADM

## 2018-03-23 RX ORDER — OXYCODONE AND ACETAMINOPHEN 7.5; 325 MG/1; MG/1
1 TABLET ORAL
Status: DISCONTINUED | OUTPATIENT
Start: 2018-03-23 | End: 2018-03-24 | Stop reason: HOSPADM

## 2018-03-23 RX ORDER — FAMOTIDINE 20 MG/1
20 TABLET, FILM COATED ORAL ONCE
Status: COMPLETED | OUTPATIENT
Start: 2018-03-23 | End: 2018-03-23

## 2018-03-23 RX ORDER — INSULIN LISPRO 100 [IU]/ML
INJECTION, SOLUTION INTRAVENOUS; SUBCUTANEOUS ONCE
Status: DISCONTINUED | OUTPATIENT
Start: 2018-03-23 | End: 2018-03-23 | Stop reason: HOSPADM

## 2018-03-23 RX ORDER — SUCCINYLCHOLINE CHLORIDE 20 MG/ML
INJECTION INTRAMUSCULAR; INTRAVENOUS AS NEEDED
Status: DISCONTINUED | OUTPATIENT
Start: 2018-03-23 | End: 2018-03-23 | Stop reason: HOSPADM

## 2018-03-23 RX ORDER — ESMOLOL HYDROCHLORIDE 10 MG/ML
INJECTION INTRAVENOUS AS NEEDED
Status: DISCONTINUED | OUTPATIENT
Start: 2018-03-23 | End: 2018-03-23 | Stop reason: HOSPADM

## 2018-03-23 RX ORDER — PROPOFOL 10 MG/ML
INJECTION, EMULSION INTRAVENOUS AS NEEDED
Status: DISCONTINUED | OUTPATIENT
Start: 2018-03-23 | End: 2018-03-23 | Stop reason: HOSPADM

## 2018-03-23 RX ORDER — MIDAZOLAM HYDROCHLORIDE 1 MG/ML
INJECTION, SOLUTION INTRAMUSCULAR; INTRAVENOUS AS NEEDED
Status: DISCONTINUED | OUTPATIENT
Start: 2018-03-23 | End: 2018-03-23 | Stop reason: HOSPADM

## 2018-03-23 RX ORDER — SODIUM CHLORIDE, SODIUM LACTATE, POTASSIUM CHLORIDE, CALCIUM CHLORIDE 600; 310; 30; 20 MG/100ML; MG/100ML; MG/100ML; MG/100ML
75 INJECTION, SOLUTION INTRAVENOUS CONTINUOUS
Status: DISCONTINUED | OUTPATIENT
Start: 2018-03-23 | End: 2018-03-23 | Stop reason: HOSPADM

## 2018-03-23 RX ORDER — SODIUM CHLORIDE 0.9 % (FLUSH) 0.9 %
5-10 SYRINGE (ML) INJECTION EVERY 8 HOURS
Status: DISCONTINUED | OUTPATIENT
Start: 2018-03-23 | End: 2018-03-23 | Stop reason: HOSPADM

## 2018-03-23 RX ORDER — ROCURONIUM BROMIDE 10 MG/ML
INJECTION, SOLUTION INTRAVENOUS AS NEEDED
Status: DISCONTINUED | OUTPATIENT
Start: 2018-03-23 | End: 2018-03-23 | Stop reason: HOSPADM

## 2018-03-23 RX ORDER — NALOXONE HYDROCHLORIDE 0.4 MG/ML
0.1 INJECTION, SOLUTION INTRAMUSCULAR; INTRAVENOUS; SUBCUTANEOUS ONCE
Status: DISCONTINUED | OUTPATIENT
Start: 2018-03-23 | End: 2018-03-23 | Stop reason: HOSPADM

## 2018-03-23 RX ORDER — MORPHINE SULFATE 4 MG/ML
INJECTION INTRAVENOUS
Status: COMPLETED
Start: 2018-03-23 | End: 2018-03-23

## 2018-03-23 RX ORDER — HYDRALAZINE HYDROCHLORIDE 20 MG/ML
INJECTION INTRAMUSCULAR; INTRAVENOUS AS NEEDED
Status: DISCONTINUED | OUTPATIENT
Start: 2018-03-23 | End: 2018-03-23 | Stop reason: HOSPADM

## 2018-03-23 RX ORDER — LIDOCAINE HYDROCHLORIDE 20 MG/ML
INJECTION, SOLUTION EPIDURAL; INFILTRATION; INTRACAUDAL; PERINEURAL AS NEEDED
Status: DISCONTINUED | OUTPATIENT
Start: 2018-03-23 | End: 2018-03-23 | Stop reason: HOSPADM

## 2018-03-23 RX ORDER — MORPHINE SULFATE 2 MG/ML
INJECTION, SOLUTION INTRAMUSCULAR; INTRAVENOUS AS NEEDED
Status: DISCONTINUED | OUTPATIENT
Start: 2018-03-23 | End: 2018-03-23 | Stop reason: HOSPADM

## 2018-03-23 RX ORDER — DEXAMETHASONE SODIUM PHOSPHATE 4 MG/ML
INJECTION, SOLUTION INTRA-ARTICULAR; INTRALESIONAL; INTRAMUSCULAR; INTRAVENOUS; SOFT TISSUE AS NEEDED
Status: DISCONTINUED | OUTPATIENT
Start: 2018-03-23 | End: 2018-03-23 | Stop reason: HOSPADM

## 2018-03-23 RX ORDER — LIDOCAINE HYDROCHLORIDE 10 MG/ML
0.1 INJECTION, SOLUTION EPIDURAL; INFILTRATION; INTRACAUDAL; PERINEURAL AS NEEDED
Status: DISCONTINUED | OUTPATIENT
Start: 2018-03-23 | End: 2018-03-23 | Stop reason: HOSPADM

## 2018-03-23 RX ORDER — SODIUM CHLORIDE 0.9 % (FLUSH) 0.9 %
5-10 SYRINGE (ML) INJECTION AS NEEDED
Status: DISCONTINUED | OUTPATIENT
Start: 2018-03-23 | End: 2018-03-23 | Stop reason: HOSPADM

## 2018-03-23 RX ADMIN — MORPHINE SULFATE 1 MG: 2 INJECTION, SOLUTION INTRAMUSCULAR; INTRAVENOUS at 17:15

## 2018-03-23 RX ADMIN — METOPROLOL TARTRATE 1.25 MG: 5 INJECTION, SOLUTION INTRAVENOUS at 12:23

## 2018-03-23 RX ADMIN — METRONIDAZOLE 250 MG: 500 INJECTION, SOLUTION INTRAVENOUS at 10:15

## 2018-03-23 RX ADMIN — SODIUM CHLORIDE, SODIUM LACTATE, POTASSIUM CHLORIDE, AND CALCIUM CHLORIDE: 600; 310; 30; 20 INJECTION, SOLUTION INTRAVENOUS at 17:00

## 2018-03-23 RX ADMIN — ROCURONIUM BROMIDE 25 MG: 10 INJECTION, SOLUTION INTRAVENOUS at 16:40

## 2018-03-23 RX ADMIN — ONDANSETRON HYDROCHLORIDE 4 MG: 2 INJECTION, SOLUTION INTRAMUSCULAR; INTRAVENOUS at 19:00

## 2018-03-23 RX ADMIN — ALBUTEROL SULFATE 2.5 MG: 2.5 SOLUTION RESPIRATORY (INHALATION) at 09:02

## 2018-03-23 RX ADMIN — MIDAZOLAM HYDROCHLORIDE 2 MG: 1 INJECTION, SOLUTION INTRAMUSCULAR; INTRAVENOUS at 16:19

## 2018-03-23 RX ADMIN — MORPHINE SULFATE 4 MG: 4 INJECTION INTRAVENOUS at 20:43

## 2018-03-23 RX ADMIN — HYDRALAZINE HYDROCHLORIDE 10 MG: 20 INJECTION INTRAMUSCULAR; INTRAVENOUS at 17:44

## 2018-03-23 RX ADMIN — BUDESONIDE AND FORMOTEROL FUMARATE DIHYDRATE 2 PUFF: 80; 4.5 AEROSOL RESPIRATORY (INHALATION) at 09:02

## 2018-03-23 RX ADMIN — PROPOFOL 170 MG: 10 INJECTION, EMULSION INTRAVENOUS at 16:28

## 2018-03-23 RX ADMIN — Medication 4 MG: at 20:43

## 2018-03-23 RX ADMIN — MORPHINE SULFATE 1 MG: 2 INJECTION, SOLUTION INTRAMUSCULAR; INTRAVENOUS at 17:35

## 2018-03-23 RX ADMIN — ONDANSETRON HYDROCHLORIDE 4 MG: 2 INJECTION, SOLUTION INTRAMUSCULAR; INTRAVENOUS at 09:33

## 2018-03-23 RX ADMIN — SODIUM CHLORIDE, SODIUM LACTATE, POTASSIUM CHLORIDE, AND CALCIUM CHLORIDE 75 ML/HR: 600; 310; 30; 20 INJECTION, SOLUTION INTRAVENOUS at 14:24

## 2018-03-23 RX ADMIN — SUCCINYLCHOLINE CHLORIDE 120 MG: 20 INJECTION INTRAMUSCULAR; INTRAVENOUS at 16:28

## 2018-03-23 RX ADMIN — METRONIDAZOLE 250 MG: 500 INJECTION, SOLUTION INTRAVENOUS at 14:38

## 2018-03-23 RX ADMIN — METOPROLOL TARTRATE 1.25 MG: 5 INJECTION, SOLUTION INTRAVENOUS at 19:18

## 2018-03-23 RX ADMIN — ALBUTEROL SULFATE 2.5 MG: 2.5 SOLUTION RESPIRATORY (INHALATION) at 13:28

## 2018-03-23 RX ADMIN — FAMOTIDINE 20 MG: 20 TABLET ORAL at 14:15

## 2018-03-23 RX ADMIN — FENTANYL CITRATE 100 MCG: 50 INJECTION, SOLUTION INTRAMUSCULAR; INTRAVENOUS at 16:28

## 2018-03-23 RX ADMIN — PROCHLORPERAZINE EDISYLATE 10 MG: 5 INJECTION INTRAMUSCULAR; INTRAVENOUS at 06:41

## 2018-03-23 RX ADMIN — ROCURONIUM BROMIDE 5 MG: 10 INJECTION, SOLUTION INTRAVENOUS at 16:28

## 2018-03-23 RX ADMIN — ONDANSETRON HYDROCHLORIDE 4 MG: 2 INJECTION, SOLUTION INTRAMUSCULAR; INTRAVENOUS at 01:57

## 2018-03-23 RX ADMIN — METRONIDAZOLE 250 MG: 500 INJECTION, SOLUTION INTRAVENOUS at 02:15

## 2018-03-23 RX ADMIN — CIPROFLOXACIN 400 MG: 2 INJECTION, SOLUTION INTRAVENOUS at 06:43

## 2018-03-23 RX ADMIN — Medication 2 MG: at 18:51

## 2018-03-23 RX ADMIN — PANTOPRAZOLE SODIUM 40 MG: 40 INJECTION, POWDER, FOR SOLUTION INTRAVENOUS at 01:57

## 2018-03-23 RX ADMIN — SODIUM CHLORIDE, SODIUM LACTATE, POTASSIUM CHLORIDE, AND CALCIUM CHLORIDE: 600; 310; 30; 20 INJECTION, SOLUTION INTRAVENOUS at 18:00

## 2018-03-23 RX ADMIN — Medication 2 MG: at 19:50

## 2018-03-23 RX ADMIN — Medication 2 MG: at 18:41

## 2018-03-23 RX ADMIN — ESMOLOL HYDROCHLORIDE 20 MG: 10 INJECTION INTRAVENOUS at 17:38

## 2018-03-23 RX ADMIN — MORPHINE SULFATE 2 MG: 2 INJECTION, SOLUTION INTRAMUSCULAR; INTRAVENOUS at 17:00

## 2018-03-23 RX ADMIN — LIDOCAINE HYDROCHLORIDE 40 MG: 20 INJECTION, SOLUTION EPIDURAL; INFILTRATION; INTRACAUDAL; PERINEURAL at 16:28

## 2018-03-23 RX ADMIN — POTASSIUM CHLORIDE: 2 INJECTION, SOLUTION, CONCENTRATE INTRAVENOUS at 11:00

## 2018-03-23 RX ADMIN — DEXAMETHASONE SODIUM PHOSPHATE 4 MG: 4 INJECTION, SOLUTION INTRA-ARTICULAR; INTRALESIONAL; INTRAMUSCULAR; INTRAVENOUS; SOFT TISSUE at 16:50

## 2018-03-23 RX ADMIN — ONDANSETRON 4 MG: 2 INJECTION INTRAMUSCULAR; INTRAVENOUS at 17:45

## 2018-03-23 RX ADMIN — PANTOPRAZOLE SODIUM 40 MG: 40 INJECTION, POWDER, FOR SOLUTION INTRAVENOUS at 12:23

## 2018-03-23 NOTE — PROGRESS NOTES
Progress Note    Patient: Kristie Casiano MRN: [id number]  SSN: [id number]    YOB: 1965  Age: 47 y.o. Sex: female      Admit Date: 3/20/2018    LOS: 3 days     Subjective:     Events of ERC/spincterotomy yesterday noted    C/o Headache today, RN says has acetaminophen 325 mg orders => I told RN ok to increase to 650 mg with sit water, going to OR after 1400 for lap cholecystectomy    No abdominal or back pain    Also no records back from [facility]- sent urgent medical records request yesterday to find out about 2006 operation (pt does not know what procedure done.)  I did look at the [facility] computer system and time too old and nothing that far back    Objective:     Vitals:    03/22/18 1550 03/22/18 1955 03/22/18 2107 03/23/18 0400   BP: 111/80 (!) 125/91  (!) 158/95   Pulse: 90 96  87   Resp: 20 19 18   Temp: 98.6 °F (37 °C) 99 °F (37.2 °C)  98.1 °F (36.7 °C)   SpO2: 97% 96% 98% 98%   Weight:       Height:            Intake and Output:  Current Shift:    Last three shifts: 03/21 1901 - 03/23 0700  In: 500 [I.V.:500]  Out: 850 [Urine:850]    Physical Exam:   Looks well not jaundice  Abdomen is soft and non tender in RUQ    Lab/Data Review:  Recent Results (from the past 12 hour(s))   METABOLIC PANEL, COMPREHENSIVE    Collection Time: 03/23/18  4:12 AM   Result Value Ref Range    Sodium 142 136 - 145 mmol/L    Potassium 3.2 (L) 3.5 - 5.5 mmol/L    Chloride 108 100 - 108 mmol/L    CO2 26 21 - 32 mmol/L    Anion gap 8 3.0 - 18 mmol/L    Glucose 93 74 - 99 mg/dL    BUN 3 (L) 7.0 - 18 MG/DL    Creatinine 0.74 0.6 - 1.3 MG/DL    BUN/Creatinine ratio 4 (L) 12 - 20      GFR est AA >60 >60 ml/min/1.73m2    GFR est non-AA >60 >60 ml/min/1.73m2    Calcium 8.7 8.5 - 10.1 MG/DL    Bilirubin, total 0.4 0.2 - 1.0 MG/DL    ALT (SGPT) 67 (H) 13 - 56 U/L    AST (SGOT) 50 (H) 15 - 37 U/L    Alk.  phosphatase 138 (H) 45 - 117 U/L    Protein, total 6.9 6.4 - 8.2 g/dL    Albumin 2.9 (L) 3.4 - 5.0 g/dL Globulin 4.0 2.0 - 4.0 g/dL    A-G Ratio 0.7 (L) 0.8 - 1.7     MAGNESIUM    Collection Time: 03/23/18  4:12 AM   Result Value Ref Range    Magnesium 1.8 1.6 - 2.6 mg/dL            Assessment:     Principal Problem:    Gallstone pancreatitis (3/20/2018)    Active Problems:    Hypokalemia (2/1/2014)      Dehydration (3/20/2018)      Acute gallstone pancreatitis (3/20/2018)      Choledocholithiasis (3/22/2018)        Plan:   Tylenol ok with sip water for HA  All questions answered about today's planned lap cholecystectomy      Signed By: Rian Tobar MD     March 23, 2018

## 2018-03-23 NOTE — PROGRESS NOTES
NUTRITION    Nutrition Screen      RECOMMENDATIONS / PLAN:     - Recommend resume clear liquid diet and advance as medically appropriate following procedure  - Continue RD inpatient monitoring and evaluation. NUTRITION INTERVENTIONS & DIAGNOSIS:     [] Meals/snacks: modify composition; NPO  [x] Collaboration and referral of nutrition care: pt discussed with MD    Nutrition Diagnosis:  Inadequate energy intake related to inability to tolerate po due to planned procedure as evidenced by pt NPO    ASSESSMENT:     Pt off unit since this morning. Was on clear liquid diet; made NPO for procedure today. Discussed with Dr Yazmin Mayer; likely to resume clear liquid diet following procedure. Nausea earlier per chart documentation. Noted significant wt loss PTA per chart hx    Average po intake adequate to meet patients estimated nutritional needs:   [] Yes     [x] No   [] Unable to determine at this time    Diet: DIET NPO      Food Allergies:  None known   Current Appetite:   [] Good     [] Fair     [] Poor     [x] Other: unknown   Appetite/meal intake prior to admission:   [] Good     [] Fair     [] Poor     [x] Other: unknown   Feeding Limitations:  [] Swallowing difficulty    [] Chewing difficulty    [] Other:  Current Meal Intake: No data found.       BM:  3/20  Skin Integrity: no pressure injury or wound noted  Edema:  None   Pertinent Medications: Reviewed: SSI, LR at 75 mL/hr, zofran    Recent Labs      03/23/18   0412  03/22/18   0357  03/21/18   0410   NA  142  139  140   K  3.2*  3.1*  3.4*   CL  108  105  105   CO2  26  24  26   GLU  93  74  102*   BUN  3*  6*  6*   CREA  0.74  0.70  0.70   CA  8.7  8.6  8.7   MG  1.8  1.8  1.9   ALB  2.9*  2.7*  3.0*   SGOT  50*  73*  190*   ALT  67*  86*  155*       Intake/Output Summary (Last 24 hours) at 03/23/18 1718  Last data filed at 03/23/18 1700   Gross per 24 hour   Intake             1000 ml   Output              200 ml   Net              800 ml       Anthropometrics:  Ht Readings from Last 1 Encounters:   03/21/18 5' 4\" (1.626 m)     Last 3 Recorded Weights in this Encounter    03/20/18 1243 03/21/18 0400   Weight: 79.4 kg (175 lb) 73.5 kg (162 lb 0.6 oz)     Body mass index is 27.81 kg/(m^2). Weight History:   Wt loss of 41 lb (20.1%) x 5 months PTA per chart hx    Weight Metrics 3/21/2018 2/19/2018 2/17/2018 11/6/2017 10/23/2017 10/18/2017 10/18/2017   Weight 162 lb 0.6 oz 180 lb 185 lb 199 lb 203 lb 9.6 oz 195 lb 195 lb 12.8 oz   BMI 27.81 kg/m2 30.9 kg/m2 31.76 kg/m2 34.16 kg/m2 34.95 kg/m2 33.47 kg/m2 33.61 kg/m2        Admitting Diagnosis: Gallstone pancreatitis  Acute gallstone pancreatitis  Dehydration  DX  DX  Pertinent PMHx: GERD, bowel obstruction, gall stones, HTN    Education Needs:        [x] None identified  [] Identified - Not appropriate at this time  []  Identified and addressed - refer to education log  Learning Limitations:   [x] None identified  [] Identified    Cultural, Scientologist & ethnic food preferences:  [x] None identified    [] Identified and addressed     ESTIMATED NUTRITION NEEDS:     Calories: 6469-1218 kcal (MSJx1.2-1.3) based on  [x] Actual BW: 74kg     [] IBW   Protein:  gm (1-1.4 gm/kg) based on  [x] Actual BW      [] IBW   Fluid: 1 mL/kcal     MONITORING & EVALUATION:     Nutrition Goal(s):   1. Po intake of meals will meet >75% of patient estimated nutritional needs within the next 7 days.   Outcome:  [] Met/Ongoing    []  Not Met    [x] New/Initial Goal     Monitoring:   [x] Food and beverage intake   [x] Diet order   [x] Nutrition-focused physical findings   [x] Treatment/therapy   [] Weight   [] Enteral nutrition intake        Previous Recommendations (for follow-up assessments only):     []   Implemented       []   Not Implemented (RD to address)      [] No Longer Appropriate     [] No Recommendation Made     Discharge Planning:  Cardiac diet   [x] Participated in care planning, discharge planning, & interdisciplinary rounds as appropriate      Dank Carmichael, [address] [place] [address]   Pager: [phone]

## 2018-03-23 NOTE — PROGRESS NOTES
[email or web address]  [phone]       Impression  1.abdominal pain/nausea/vomit- improved  2. Gallstone pancreatitis-   -3/20/2018- ultrasound- Thickened gallbladder wall with possible fluid in the wall vs. adjacent to the  gallbladder in the setting of gallstones.  Concerning for acute cholecystitis. Dilated common bile duct. Possible sludge or stones within the common duct as  Well.  -3/20/18- Ct abdomen-   Acute pancreatitis. Chronic and stable mild dilation of the CBD for many years. Choledocholithiasis cannot be excluded by CT. Cholelithiasis and nonspecific gallbladder wall thickening.   -3/22/18- ERCP-  Small stone fragments/sludge removed w 12mm balloon after small BES. PD not injected. 3. Elevated LFTs-trending down  4. Gerd  5. H/O small bowel obstruction 2006    Plan:  1. Npo for Lap cholecystectomy today  2. Cont supportive care    Chief Complaint: abdominal pain    Subjective:  Pt uneventful overnight.  Abdominal pain improved        Eyes: conjunctiva normal, EOM normal   ENT: Mucous membranes moist, no lesion or thrush   Cardiovascular:  normal rate and regular rhythm, no murmur   Pulmonary/Chest Wall: breath sounds normal and effort normal   Abdominal:  soft, non-acute, non-tender, no appreciable mass or hepatosplenomegaly, no appreciable ascites       Visit Vitals    BP (!) 173/97 (BP 1 Location: Left arm, BP Patient Position: At rest)    Pulse 82    Temp 97.9 °F (36.6 °C)    Resp 18    Ht 5' 4\" (1.626 m)    Wt 73.5 kg (162 lb 0.6 oz)    SpO2 99%    Breastfeeding No    BMI 27.81 kg/m2           Intake/Output Summary (Last 24 hours) at 03/23/18 1126  Last data filed at 03/22/18 1833   Gross per 24 hour   Intake                0 ml   Output              200 ml   Net             -200 ml       CBC w/Diff    Lab Results   Component Value Date/Time    WBC 9.5 03/21/2018 04:10 AM    RBC 4.78 03/21/2018 04:10 AM    HGB 13.0 03/21/2018 04:10 AM    HCT 38.4 03/21/2018 04:10 AM    MCV 80.3 03/21/2018 04:10 AM    MCH 27.2 03/21/2018 04:10 AM    MCHC 33.9 03/21/2018 04:10 AM    RDW 14.1 03/21/2018 04:10 AM     03/21/2018 04:10 AM    Lab Results   Component Value Date/Time    GRANS 78 (H) 03/21/2018 04:10 AM    LYMPH 16 (L) 03/21/2018 04:10 AM    EOS 0 03/21/2018 04:10 AM    BANDS 0 05/17/2010 03:51 PM    BASOS 0 03/21/2018 04:10 AM    MYELO 0 05/17/2010 03:51 PM    METAS 0 05/17/2010 03:51 PM    PRO 0 05/17/2010 03:51 PM    BLAST 0 05/17/2010 03:51 PM      Basic Metabolic Profile   Recent Labs      03/23/18   0412   NA  142   K  3.2*   CL  108   CO2  26   BUN  3*   CA  8.7   MG  1.8        Hepatic Function    Lab Results   Component Value Date/Time    ALB 2.9 (L) 03/23/2018 04:12 AM    TP 6.9 03/23/2018 04:12 AM     (H) 03/23/2018 04:12 AM    Lab Results   Component Value Date/Time    SGOT 50 (H) 03/23/2018 04:12 AM          Tiffanie Shannon NP  [facility], [facility]  [email or web address]

## 2018-03-23 NOTE — ANESTHESIA PREPROCEDURE EVALUATION
Anesthetic History   No history of anesthetic complications            Review of Systems / Medical History  Patient summary reviewed and pertinent labs reviewed    Pulmonary  Within defined limits          Asthma        Neuro/Psych   Within defined limits           Cardiovascular    Hypertension              Exercise tolerance: <4 METS     GI/Hepatic/Renal  Within defined limits   GERD           Endo/Other  Within defined limits      Obesity     Other Findings   Comments: Documentation of current medication  Current medications obtained, documented and obtained? YES      Risk Factors for Postoperative nausea/vomiting:       History of postoperative nausea/vomiting? NO       Female? YES       Motion sickness? NO       Intended opioid administration for postoperative analgesia? YES      Smoking Abstinence:  Current Smoker? NO  Elective Surgery? YES  Seen preoperatively by anesthesiologist or proxy prior to day of surgery? YES  Pt abstained from smoking 24 hours prior to anesthesia?  YES    Preventive care/screening for High Blood Pressure:  Aged 18 years and older: YES  Screened for high blood pressure: YES  Patients with high blood pressure referred to primary care provider   for BP management: YES                     Physical Exam    Airway  Mallampati: II  TM Distance: 4 - 6 cm  Neck ROM: normal range of motion   Mouth opening: Normal     Cardiovascular    Rhythm: regular  Rate: normal         Dental  No notable dental hx       Pulmonary  Breath sounds clear to auscultation               Abdominal  GI exam deferred       Other Findings            Anesthetic Plan    ASA: 2  Anesthesia type: general          Induction: Intravenous  Anesthetic plan and risks discussed with: Patient

## 2018-03-23 NOTE — BRIEF OP NOTE
BRIEF OPERATIVE NOTE    Date of Procedure: 3/23/2018   Preoperative Diagnosis: Gallstone pancreatitis, cholelithiasis, Choledocholithiasis and biliary sludge; umbilical hernia, s/p open laparotomy and Gastric tube placement  Postoperative Diagnosis: allstone pancreatitis, cholelithiasis, Choledocholithiasis and biliary sludge; umbilical hernia, s/p open laparotomy and Gastric tube placement  Procedure(s):  CHOLECYSTECTOMY LAPAROSCOPIC (dome down technique)  Laparoscopic lysis of adhesions  Laparoscopic repair of umbilical hernia  Surgeon(s) and Role:     * Juanjo Mccullough MD - Primary         Assistant Staff: None      Surgical Staff:  Circ-1: Vinny Bosch RN  Scrub Tech-1: Charly Lpee  Surg Asst-1: [facility]  Event Time In   Incision Start 1648   Incision Close 1805     Anesthesia: General   Estimated Blood Loss: 100 ml  2000 ml iv fluids  Specimens:   ID Type Source Tests Collected by Time Destination   1 : gallbladder with contents Preservative Gallbladder  Juanjo Mccullough MD 3/23/2018 1755 Pathology      Findings: copious omental adhesions to midline and RUQ and to gallbladder, non inflamed gallbladder, but white appearing (chronic) unusual position of duodenum medially, very widened cystic duct   Complications: None.   Implants: * No implants in log *    OP Note  [id number]

## 2018-03-23 NOTE — PROGRESS NOTES
Surgeon    The sanchez RN told me that the [facility] told her they do not keep records past 5 years, so I do know know what operation she had in 2006. Now she says it was for a \"bowel obstruction\"  Before she said if was for acid reflux. He has not gotten her Flagyl for several hours because of KCL infusions. I asked the RN to stop the KCL and give the Flagyl now. Nothing else has changed.

## 2018-03-23 NOTE — PROGRESS NOTES
SUBJECTIVE:    Continue to have some epigastric pain and nausea but better than yesterday. No chest pain. No SOB or cough. No vomiting. Had a few episodes of vomiting. OBJECTIVE:    BP (!) 173/97 (BP 1 Location: Left arm, BP Patient Position: At rest)  Pulse 82  Temp 97.9 °F (36.6 °C)  Resp 18  Ht 5' 4\" (1.626 m)  Wt 73.5 kg (162 lb 0.6 oz)  SpO2 99%  Breastfeeding? No  BMI 27.81 kg/m2    CVS: RRR  RS: CTA bilaterally  GI: ND, Soft, BS +   Extremities: no pedal edema  General: NAD, Awake  CNS: Moves all extremities    ASSESSMENT:    1. Abdominal pain and nausea, better. 2. Gallstone pancreatitis s/p ERCP and removal of choledocholithiasis   3. Abnormal LFTs, improved   4. Chronic intermittent mild asthma, no exacerbation  5. Hypokalemia    6.  HTN    PLAN:    Cont current management  Cholecystectomy today  Lopressor prn  Start PO medications after surgery  Replete K   Possible discharge home later tomorrow if remains stable and cleared by surgeon    CMP:   Lab Results   Component Value Date/Time     03/23/2018 04:12 AM    K 3.2 (L) 03/23/2018 04:12 AM     03/23/2018 04:12 AM    CO2 26 03/23/2018 04:12 AM    AGAP 8 03/23/2018 04:12 AM    GLU 93 03/23/2018 04:12 AM    BUN 3 (L) 03/23/2018 04:12 AM    CREA 0.74 03/23/2018 04:12 AM    GFRAA >60 03/23/2018 04:12 AM    GFRNA >60 03/23/2018 04:12 AM    CA 8.7 03/23/2018 04:12 AM    MG 1.8 03/23/2018 04:12 AM    ALB 2.9 (L) 03/23/2018 04:12 AM    TP 6.9 03/23/2018 04:12 AM    GLOB 4.0 03/23/2018 04:12 AM    AGRAT 0.7 (L) 03/23/2018 04:12 AM    SGOT 50 (H) 03/23/2018 04:12 AM    ALT 67 (H) 03/23/2018 04:12 AM     CBC:   No results found for: WBC, HGB, HGBEXT, HCT, HCTEXT, PLT, PLTEXT, HGBEXT, HCTEXT, PLTEXT

## 2018-03-24 VITALS
RESPIRATION RATE: 19 BRPM | DIASTOLIC BLOOD PRESSURE: 87 MMHG | HEART RATE: 86 BPM | WEIGHT: 162.98 LBS | OXYGEN SATURATION: 98 % | TEMPERATURE: 99 F | BODY MASS INDEX: 27.83 KG/M2 | SYSTOLIC BLOOD PRESSURE: 156 MMHG | HEIGHT: 64 IN

## 2018-03-24 LAB
ALBUMIN SERPL-MCNC: 3.1 G/DL (ref 3.4–5)
ALBUMIN/GLOB SERPL: 0.6 {RATIO} (ref 0.8–1.7)
ALP SERPL-CCNC: 143 U/L (ref 45–117)
ALT SERPL-CCNC: 73 U/L (ref 13–56)
ANION GAP SERPL CALC-SCNC: 8 MMOL/L (ref 3–18)
AST SERPL-CCNC: 68 U/L (ref 15–37)
BASOPHILS # BLD: 0 K/UL (ref 0–0.1)
BASOPHILS NFR BLD: 0 % (ref 0–2)
BILIRUB SERPL-MCNC: 0.5 MG/DL (ref 0.2–1)
BUN SERPL-MCNC: 4 MG/DL (ref 7–18)
BUN/CREAT SERPL: 6 (ref 12–20)
CALCIUM SERPL-MCNC: 9.5 MG/DL (ref 8.5–10.1)
CHLORIDE SERPL-SCNC: 101 MMOL/L (ref 100–108)
CO2 SERPL-SCNC: 26 MMOL/L (ref 21–32)
CREAT SERPL-MCNC: 0.68 MG/DL (ref 0.6–1.3)
DIFFERENTIAL METHOD BLD: ABNORMAL
EOSINOPHIL # BLD: 0 K/UL (ref 0–0.4)
EOSINOPHIL NFR BLD: 0 % (ref 0–5)
ERYTHROCYTE [DISTWIDTH] IN BLOOD BY AUTOMATED COUNT: 14.1 % (ref 11.6–14.5)
GLOBULIN SER CALC-MCNC: 4.8 G/DL (ref 2–4)
GLUCOSE SERPL-MCNC: 110 MG/DL (ref 74–99)
HCT VFR BLD AUTO: 41.8 % (ref 35–45)
HGB BLD-MCNC: 14.3 G/DL (ref 12–16)
LYMPHOCYTES # BLD: 0.8 K/UL (ref 0.9–3.6)
LYMPHOCYTES NFR BLD: 7 % (ref 21–52)
MAGNESIUM SERPL-MCNC: 1.6 MG/DL (ref 1.6–2.6)
MCH RBC QN AUTO: 27.2 PG (ref 24–34)
MCHC RBC AUTO-ENTMCNC: 34.2 G/DL (ref 31–37)
MCV RBC AUTO: 79.6 FL (ref 74–97)
MONOCYTES # BLD: 0.8 K/UL (ref 0.05–1.2)
MONOCYTES NFR BLD: 7 % (ref 3–10)
NEUTS SEG # BLD: 10.2 K/UL (ref 1.8–8)
NEUTS SEG NFR BLD: 86 % (ref 40–73)
PLATELET # BLD AUTO: 258 K/UL (ref 135–420)
PMV BLD AUTO: 11.1 FL (ref 9.2–11.8)
POTASSIUM SERPL-SCNC: 3.5 MMOL/L (ref 3.5–5.5)
PROT SERPL-MCNC: 7.9 G/DL (ref 6.4–8.2)
RBC # BLD AUTO: 5.25 M/UL (ref 4.2–5.3)
SODIUM SERPL-SCNC: 135 MMOL/L (ref 136–145)
WBC # BLD AUTO: 11.8 K/UL (ref 4.6–13.2)

## 2018-03-24 PROCEDURE — 74011000250 HC RX REV CODE- 250: Performed by: INTERNAL MEDICINE

## 2018-03-24 PROCEDURE — 85025 COMPLETE CBC W/AUTO DIFF WBC: CPT | Performed by: INTERNAL MEDICINE

## 2018-03-24 PROCEDURE — C9113 INJ PANTOPRAZOLE SODIUM, VIA: HCPCS | Performed by: INTERNAL MEDICINE

## 2018-03-24 PROCEDURE — 36415 COLL VENOUS BLD VENIPUNCTURE: CPT | Performed by: INTERNAL MEDICINE

## 2018-03-24 PROCEDURE — 74011250636 HC RX REV CODE- 250/636: Performed by: INTERNAL MEDICINE

## 2018-03-24 PROCEDURE — 83735 ASSAY OF MAGNESIUM: CPT | Performed by: INTERNAL MEDICINE

## 2018-03-24 PROCEDURE — 74011250637 HC RX REV CODE- 250/637: Performed by: INTERNAL MEDICINE

## 2018-03-24 PROCEDURE — 94640 AIRWAY INHALATION TREATMENT: CPT

## 2018-03-24 PROCEDURE — 74011250636 HC RX REV CODE- 250/636

## 2018-03-24 PROCEDURE — 74011250637 HC RX REV CODE- 250/637: Performed by: SURGERY

## 2018-03-24 PROCEDURE — 80053 COMPREHEN METABOLIC PANEL: CPT | Performed by: INTERNAL MEDICINE

## 2018-03-24 PROCEDURE — 74011250636 HC RX REV CODE- 250/636: Performed by: SURGERY

## 2018-03-24 RX ORDER — MAGNESIUM SULFATE 1 G/100ML
1 INJECTION INTRAVENOUS ONCE
Status: COMPLETED | OUTPATIENT
Start: 2018-03-24 | End: 2018-03-24

## 2018-03-24 RX ORDER — OXYCODONE AND ACETAMINOPHEN 7.5; 325 MG/1; MG/1
1 TABLET ORAL
Qty: 12 TAB | Refills: 0 | Status: SHIPPED | OUTPATIENT
Start: 2018-03-24 | End: 2018-08-22

## 2018-03-24 RX ORDER — MORPHINE SULFATE 4 MG/ML
INJECTION INTRAVENOUS
Status: COMPLETED
Start: 2018-03-24 | End: 2018-03-24

## 2018-03-24 RX ORDER — PANTOPRAZOLE SODIUM 40 MG/1
40 TABLET, DELAYED RELEASE ORAL
Qty: 7 TAB | Refills: 0 | Status: SHIPPED | OUTPATIENT
Start: 2018-03-25

## 2018-03-24 RX ORDER — LISINOPRIL 5 MG/1
5 TABLET ORAL DAILY
Status: DISCONTINUED | OUTPATIENT
Start: 2018-03-24 | End: 2018-03-24

## 2018-03-24 RX ORDER — POTASSIUM CHLORIDE 20 MEQ/1
20 TABLET, EXTENDED RELEASE ORAL
Status: COMPLETED | OUTPATIENT
Start: 2018-03-24 | End: 2018-03-24

## 2018-03-24 RX ORDER — AMLODIPINE BESYLATE 2.5 MG/1
2.5 TABLET ORAL DAILY
Qty: 7 TAB | Refills: 0 | Status: SHIPPED | OUTPATIENT
Start: 2018-03-24 | End: 2018-10-04

## 2018-03-24 RX ORDER — LISINOPRIL 2.5 MG/1
2.5 TABLET ORAL DAILY
Status: DISCONTINUED | OUTPATIENT
Start: 2018-03-24 | End: 2018-03-24 | Stop reason: HOSPADM

## 2018-03-24 RX ORDER — PANTOPRAZOLE SODIUM 40 MG/1
40 TABLET, DELAYED RELEASE ORAL
Status: DISCONTINUED | OUTPATIENT
Start: 2018-03-25 | End: 2018-03-24 | Stop reason: HOSPADM

## 2018-03-24 RX ORDER — METOPROLOL TARTRATE 25 MG/1
25 TABLET, FILM COATED ORAL 2 TIMES DAILY
Status: DISCONTINUED | OUTPATIENT
Start: 2018-03-24 | End: 2018-03-24

## 2018-03-24 RX ORDER — ONDANSETRON 8 MG/1
8 TABLET, ORALLY DISINTEGRATING ORAL
Qty: 10 TAB | Refills: 0 | Status: SHIPPED | OUTPATIENT
Start: 2018-03-24

## 2018-03-24 RX ORDER — METOPROLOL TARTRATE 25 MG/1
25 TABLET, FILM COATED ORAL EVERY 12 HOURS
Qty: 20 TAB | Refills: 0 | Status: SHIPPED | OUTPATIENT
Start: 2018-03-24 | End: 2018-10-04

## 2018-03-24 RX ORDER — ENALAPRILAT 1.25 MG/ML
1.25 INJECTION INTRAVENOUS
Status: DISCONTINUED | OUTPATIENT
Start: 2018-03-24 | End: 2018-03-24

## 2018-03-24 RX ORDER — METOPROLOL TARTRATE 25 MG/1
25 TABLET, FILM COATED ORAL EVERY 12 HOURS
Status: DISCONTINUED | OUTPATIENT
Start: 2018-03-24 | End: 2018-03-24 | Stop reason: HOSPADM

## 2018-03-24 RX ORDER — METOPROLOL TARTRATE 25 MG/1
25 TABLET, FILM COATED ORAL EVERY 12 HOURS
Qty: 20 TAB | Refills: 0 | Status: SHIPPED | OUTPATIENT
Start: 2018-03-24 | End: 2018-03-24

## 2018-03-24 RX ORDER — LANOLIN ALCOHOL/MO/W.PET/CERES
500 CREAM (GRAM) TOPICAL DAILY
Status: DISCONTINUED | OUTPATIENT
Start: 2018-03-24 | End: 2018-03-24 | Stop reason: HOSPADM

## 2018-03-24 RX ADMIN — OXYCODONE HYDROCHLORIDE AND ACETAMINOPHEN 1 TABLET: 7.5; 325 TABLET ORAL at 11:39

## 2018-03-24 RX ADMIN — METRONIDAZOLE 250 MG: 500 INJECTION, SOLUTION INTRAVENOUS at 09:21

## 2018-03-24 RX ADMIN — PANTOPRAZOLE SODIUM 40 MG: 40 INJECTION, POWDER, FOR SOLUTION INTRAVENOUS at 03:14

## 2018-03-24 RX ADMIN — POTASSIUM CHLORIDE 20 MEQ: 20 TABLET, EXTENDED RELEASE ORAL at 11:39

## 2018-03-24 RX ADMIN — METRONIDAZOLE 250 MG: 500 INJECTION, SOLUTION INTRAVENOUS at 03:14

## 2018-03-24 RX ADMIN — CYANOCOBALAMIN TAB 500 MCG 500 MCG: 500 TAB at 11:39

## 2018-03-24 RX ADMIN — CIPROFLOXACIN 400 MG: 2 INJECTION, SOLUTION INTRAVENOUS at 05:32

## 2018-03-24 RX ADMIN — LISINOPRIL 2.5 MG: 2.5 TABLET ORAL at 11:39

## 2018-03-24 RX ADMIN — MAGNESIUM SULFATE IN DEXTROSE 1 G: 10 INJECTION, SOLUTION INTRAVENOUS at 10:39

## 2018-03-24 RX ADMIN — METOPROLOL TARTRATE 25 MG: 25 TABLET ORAL at 11:39

## 2018-03-24 RX ADMIN — METOPROLOL TARTRATE 1.25 MG: 5 INJECTION, SOLUTION INTRAVENOUS at 00:20

## 2018-03-24 RX ADMIN — ENALAPRILAT 1.25 MG: 1.25 INJECTION INTRAVENOUS at 09:21

## 2018-03-24 RX ADMIN — ALBUTEROL SULFATE 2.5 MG: 2.5 SOLUTION RESPIRATORY (INHALATION) at 08:04

## 2018-03-24 RX ADMIN — BUDESONIDE AND FORMOTEROL FUMARATE DIHYDRATE 2 PUFF: 80; 4.5 AEROSOL RESPIRATORY (INHALATION) at 08:04

## 2018-03-24 RX ADMIN — MORPHINE SULFATE 4 MG: 4 INJECTION INTRAVENOUS at 03:43

## 2018-03-24 RX ADMIN — ENALAPRILAT 1.25 MG: 1.25 INJECTION INTRAVENOUS at 03:14

## 2018-03-24 RX ADMIN — ONDANSETRON HYDROCHLORIDE 4 MG: 2 INJECTION, SOLUTION INTRAMUSCULAR; INTRAVENOUS at 11:38

## 2018-03-24 RX ADMIN — Medication 4 MG: at 03:43

## 2018-03-24 NOTE — DISCHARGE SUMMARY
PATIENT DISCHARGE INSTRUCTIONS      PATIENT DISCHARGE INSTRUCTIONS    Kp Shaver / [id number] : 1965    Admitted 3/20/2018 Discharged: 3/24/2018     Dictated # [id number]    · It is important that you take the medication exactly as they are prescribed. · Keep your medication in the bottles provided by the pharmacist and keep a list of the medication names, dosages, and times to be taken in your wallet. · Do not take other medications without consulting your doctor. What to do at Home    Recommended Diet: Cardiac Diet    Recommended Activity: Activity as tolerated and No driving for 2 weeks    Current Discharge Medication List      START taking these medications    Details   oxyCODONE-acetaminophen (PERCOCET 7.5) 7.5-325 mg per tablet Take 1 Tab by mouth every four (4) hours as needed. Max Daily Amount: 6 Tabs. Indications: Pain  Qty: 12 Tab, Refills: 0    Associated Diagnoses: S/P laparoscopic cholecystectomy      pantoprazole (PROTONIX) 40 mg tablet Take 1 Tab by mouth Daily (before breakfast). Qty: 7 Tab, Refills: 0      amLODIPine (NORVASC) 2.5 mg tablet Take 1 Tab by mouth daily. Indications: do not bharati it if your upper blood pressure is less than 120  Qty: 7 Tab, Refills: 0      metoprolol tartrate (LOPRESSOR) 25 mg tablet Take 1 Tab by mouth every twelve (12) hours. Indications: do not take it if your upper blood pressure is less than 110  Qty: 20 Tab, Refills: 0         CONTINUE these medications which have CHANGED    Details   ondansetron (ZOFRAN ODT) 8 mg disintegrating tablet Take 1 Tab by mouth every eight (8) hours as needed for Nausea. Qty: 10 Tab, Refills: 0         CONTINUE these medications which have NOT CHANGED    Details   acetaminophen (TYLENOL EXTRA STRENGTH) 500 mg tablet Take  by mouth every six (6) hours as needed for Pain. fluticasone-salmeterol (ADVAIR DISKUS) 250-50 mcg/dose diskus inhaler Take 1 Puff by inhalation every twelve (12) hours.       CYANOCOBALAMIN, VITAMIN B-12, (VITAMIN B-12 PO) Take  by mouth. albuterol (PROVENTIL, VENTOLIN) 90 mcg/actuation inhaler Take 1-2 Puffs by inhalation every four (4) hours as needed for Wheezing.   Qty: 1 g, Refills: 3         STOP taking these medications       traMADol (ULTRAM) 50 mg tablet Comments:   Reason for Stopping:         promethazine (PHENERGAN) 25 mg tablet Comments:   Reason for Stopping:         diphenhydrAMINE (BENADRYL) 25 mg capsule Comments:   Reason for Stopping:         triamcinolone acetonide (KENALOG) 0.1 % ointment Comments:   Reason for Stopping:         ergocalciferol (VITAMIN D2) 50,000 unit capsule Comments:   Reason for Stopping:                 Signed By: Kathi Pink MD     March 24, 2018

## 2018-03-24 NOTE — PERIOP NOTES
TRANSFER - OUT REPORT:    Verbal report given to [facility] RN on Fidelia Nguyen  being transferred to [facility] for routine post - op       Report consisted of patients Situation, Background, Assessment and   Recommendations(SBAR). Information from the following report(s) SBAR, OR Summary, Procedure Summary, Intake/Output, Recent Results and Cardiac Rhythm ST was reviewed with the receiving nurse. Lines:   Peripheral IV 03/22/18 Left Hand (Active)   Site Assessment Clean, dry, & intact 3/23/2018  8:00 AM   Phlebitis Assessment 0 3/23/2018  8:00 AM   Infiltration Assessment 0 3/23/2018  8:00 AM   Dressing Status Clean, dry, & intact 3/23/2018  8:00 AM   Dressing Type Tape;Transparent 3/23/2018  8:00 AM   Hub Color/Line Status Blue;Capped 3/23/2018  8:00 AM   Action Taken Open ports on tubing capped 3/23/2018  8:00 AM   Alcohol Cap Used Yes 3/23/2018  8:00 AM       Peripheral IV 03/22/18 Left Forearm (Active)   Site Assessment Clean, dry, & intact 3/23/2018  8:00 AM   Phlebitis Assessment 0 3/23/2018  8:00 AM   Infiltration Assessment 0 3/23/2018  8:00 AM   Dressing Status Clean, dry, & intact 3/23/2018  8:00 AM   Dressing Type Tape;Transparent 3/23/2018  8:00 AM   Hub Color/Line Status Pink;Capped 3/23/2018  8:00 AM   Action Taken Open ports on tubing capped 3/23/2018  8:00 AM   Alcohol Cap Used Yes 3/23/2018  8:00 AM       Peripheral IV 03/23/18 Right Hand (Active)   Site Assessment Clean, dry, & intact 3/23/2018  9:15 AM   Phlebitis Assessment 0 3/23/2018  9:15 AM   Infiltration Assessment 0 3/23/2018  9:15 AM   Dressing Status Clean, dry, & intact 3/23/2018  9:15 AM   Dressing Type Transparent 3/23/2018  9:15 AM   Hub Color/Line Status Blue;Capped;Flushed 3/23/2018  9:15 AM   Action Taken Other (comment) 3/23/2018  9:15 AM   Alcohol Cap Used Yes 3/23/2018  9:15 AM       Peripheral IV 03/23/18 Left Hand (Active)        Opportunity for questions and clarification was provided.       Patient transported with:   O2 @ 2 liters  Registered Nurse

## 2018-03-24 NOTE — PROGRESS NOTES
Progress Note    Patient: Ephraim Aschoff MRN: [id number]  SSN: [id number]    YOB: 1965  Age: 47 y.o. Sex: female      Admit Date: 3/20/2018    LOS: 4 days    Post op Day #1 s/p Laparoscopic Cholecystectomy (dome down technique) & umbilical hernia defect repair    Subjective:     Looks well, intermittent nausea, no vomiting. Pt note BP still high, not a lot of abdominal pain, urinating fine. Objective:     Vitals:    03/24/18 0408 03/24/18 0532 03/24/18 0657 03/24/18 0823   BP: (!) 177/109 159/89  (!) 172/109   Pulse: (!) 106 (!) 114  (!) 124   Resp: 20   20   Temp: 98.1 °F (36.7 °C)   98.2 °F (36.8 °C)   SpO2: 95% 95%  96%   Weight:   73.9 kg (162 lb 15.6 oz)    Height:            Intake and Output:  Current Shift:    Last three shifts: 03/22 1901 - 03/24 0700  In: 2000 [I.V.:2000]  Out: 1500 [Urine:1400]    Physical Exam:   Looks well, not jaundiced,  Very high BP   Abdomen is soft and minimally tender over trochar sites- Exparel used  No bulges    Lab/Data Review:  Recent Results (from the past 12 hour(s))   METABOLIC PANEL, COMPREHENSIVE    Collection Time: 03/24/18  2:46 AM   Result Value Ref Range    Sodium 135 (L) 136 - 145 mmol/L    Potassium 3.5 3.5 - 5.5 mmol/L    Chloride 101 100 - 108 mmol/L    CO2 26 21 - 32 mmol/L    Anion gap 8 3.0 - 18 mmol/L    Glucose 110 (H) 74 - 99 mg/dL    BUN 4 (L) 7.0 - 18 MG/DL    Creatinine 0.68 0.6 - 1.3 MG/DL    BUN/Creatinine ratio 6 (L) 12 - 20      GFR est AA >60 >60 ml/min/1.73m2    GFR est non-AA >60 >60 ml/min/1.73m2    Calcium 9.5 8.5 - 10.1 MG/DL    Bilirubin, total 0.5 0.2 - 1.0 MG/DL    ALT (SGPT) 73 (H) 13 - 56 U/L    AST (SGOT) 68 (H) 15 - 37 U/L    Alk.  phosphatase 143 (H) 45 - 117 U/L    Protein, total 7.9 6.4 - 8.2 g/dL    Albumin 3.1 (L) 3.4 - 5.0 g/dL    Globulin 4.8 (H) 2.0 - 4.0 g/dL    A-G Ratio 0.6 (L) 0.8 - 1.7     MAGNESIUM    Collection Time: 03/24/18  2:46 AM   Result Value Ref Range    Magnesium 1.6 1.6 - 2.6 mg/dL CBC WITH AUTOMATED DIFF    Collection Time: 03/24/18  2:46 AM   Result Value Ref Range    WBC 11.8 4.6 - 13.2 K/uL    RBC 5.25 4.20 - 5.30 M/uL    HGB 14.3 12.0 - 16.0 g/dL    HCT 41.8 35.0 - 45.0 %    MCV 79.6 74.0 - 97.0 FL    MCH 27.2 24.0 - 34.0 PG    MCHC 34.2 31.0 - 37.0 g/dL    RDW 14.1 11.6 - 14.5 %    PLATELET 836 359 - 566 K/uL    MPV 11.1 9.2 - 11.8 FL    NEUTROPHILS 86 (H) 40 - 73 %    LYMPHOCYTES 7 (L) 21 - 52 %    MONOCYTES 7 3 - 10 %    EOSINOPHILS 0 0 - 5 %    BASOPHILS 0 0 - 2 %    ABS. NEUTROPHILS 10.2 (H) 1.8 - 8.0 K/UL    ABS. LYMPHOCYTES 0.8 (L) 0.9 - 3.6 K/UL    ABS. MONOCYTES 0.8 0.05 - 1.2 K/UL    ABS. EOSINOPHILS 0.0 0.0 - 0.4 K/UL    ABS. BASOPHILS 0.0 0.0 - 0.1 K/UL    DF AUTOMATED          Pathology is pending. Assessment:     Principal Problem:    Gallstone pancreatitis (3/20/2018)    Active Problems:    Hypokalemia (2/1/2014)      Dehydration (3/20/2018)      Acute gallstone pancreatitis (3/20/2018)      Choledocholithiasis (3/22/2018)    hypokalemia better after after IV replacement 3.2=>3.5  Magnesium low normal 1.6  Slight rise in transaminases after lap cholecystectomy, this is expected after surgery because of cautery used on liver surface,  bilirubin is not elevated. Plan:     OK to go home from surgical standpoint but think BP needs to be under better control  Had RN staff bring her breakfast change  OK to take oral medications. One dose of oral KCL 20 meqls po,  1 gm mag sulfate iv  I have call out to med MD.  Follow up in my office in two weeks they have my [employer] to shower starting tonight  No lifting > 15 lbs for two weeks. Percocet 7.5/325 Q 4 hours prn pain, script on chart.   Stop all antibiotics    Signed By: Sanjeev Garsia MD     March 24, 2018

## 2018-03-24 NOTE — PROGRESS NOTES
SUBJECTIVE:    Sitting up in chair. Wants to go home. Had lunch and feeling better. Denies for chest and abdominal pain. No SOB or cough. Some nausea but no vomiting. Had a BM. Has a blood pressure machine at home.  is at bedside. OBJECTIVE:    /87  Pulse 86  Temp 99 °F (37.2 °C)  Resp 19  Ht 5' 4\" (1.626 m)  Wt 73.9 kg (162 lb 15.6 oz)  SpO2 98%  Breastfeeding? No  BMI 27.98 kg/m2    CVS: RRR  RS: CTA bilaterally  GI: ND, Soft, BS +   Extremities: no pedal edema  General: NAD, Awake  CNS: Moves all extremities    ASSESSMENT:    1. Abdominal pain and nausea, better. 2. Gallstone pancreatitis s/p ERCP and removal of choledocholithiasis   3. Abnormal LFTs, improved   4. Chronic intermittent mild asthma, no exacerbation  5. Hypokalemia  And hypomagnesemia  6. HTN    PLAN:    Cont current management  Discharge patient home today   Advised to check blood pressure before taking BP medications and do not take.      CMP:   Lab Results   Component Value Date/Time     (L) 03/24/2018 02:46 AM    K 3.5 03/24/2018 02:46 AM     03/24/2018 02:46 AM    CO2 26 03/24/2018 02:46 AM    AGAP 8 03/24/2018 02:46 AM     (H) 03/24/2018 02:46 AM    BUN 4 (L) 03/24/2018 02:46 AM    CREA 0.68 03/24/2018 02:46 AM    GFRAA >60 03/24/2018 02:46 AM    GFRNA >60 03/24/2018 02:46 AM    CA 9.5 03/24/2018 02:46 AM    MG 1.6 03/24/2018 02:46 AM    ALB 3.1 (L) 03/24/2018 02:46 AM    TP 7.9 03/24/2018 02:46 AM    GLOB 4.8 (H) 03/24/2018 02:46 AM    AGRAT 0.6 (L) 03/24/2018 02:46 AM    SGOT 68 (H) 03/24/2018 02:46 AM    ALT 73 (H) 03/24/2018 02:46 AM     CBC:   Lab Results   Component Value Date/Time    WBC 11.8 03/24/2018 02:46 AM    HGB 14.3 03/24/2018 02:46 AM    HCT 41.8 03/24/2018 02:46 AM     03/24/2018 02:46 AM     Current Discharge Medication List      START taking these medications    Details   oxyCODONE-acetaminophen (PERCOCET 7.5) 7.5-325 mg per tablet Take 1 Tab by mouth every four (4) hours as needed. Max Daily Amount: 6 Tabs. Indications: Pain  Qty: 12 Tab, Refills: 0    Associated Diagnoses: S/P laparoscopic cholecystectomy      metoprolol tartrate (LOPRESSOR) 25 mg tablet Take 1 Tab by mouth every twelve (12) hours. Indications: do not take it if your upper blood pressure is less than 110  Qty: 20 Tab, Refills: 0      pantoprazole (PROTONIX) 40 mg tablet Take 1 Tab by mouth Daily (before breakfast). Qty: 7 Tab, Refills: 0      amLODIPine (NORVASC) 2.5 mg tablet Take 1 Tab by mouth daily. Indications: do not bharati it if your upper blood pressure is less than 120  Qty: 7 Tab, Refills: 0         CONTINUE these medications which have CHANGED    Details   ondansetron (ZOFRAN ODT) 8 mg disintegrating tablet Take 1 Tab by mouth every eight (8) hours as needed for Nausea. Qty: 10 Tab, Refills: 0         CONTINUE these medications which have NOT CHANGED    Details   acetaminophen (TYLENOL EXTRA STRENGTH) 500 mg tablet Take  by mouth every six (6) hours as needed for Pain. fluticasone-salmeterol (ADVAIR DISKUS) 250-50 mcg/dose diskus inhaler Take 1 Puff by inhalation every twelve (12) hours. CYANOCOBALAMIN, VITAMIN B-12, (VITAMIN B-12 PO) Take  by mouth. albuterol (PROVENTIL, VENTOLIN) 90 mcg/actuation inhaler Take 1-2 Puffs by inhalation every four (4) hours as needed for Wheezing.   Qty: 1 g, Refills: 3         STOP taking these medications       traMADol (ULTRAM) 50 mg tablet Comments:   Reason for Stopping:         promethazine (PHENERGAN) 25 mg tablet Comments:   Reason for Stopping:         diphenhydrAMINE (BENADRYL) 25 mg capsule Comments:   Reason for Stopping:         triamcinolone acetonide (KENALOG) 0.1 % ointment Comments:   Reason for Stopping:         ergocalciferol (VITAMIN D2) 50,000 unit capsule Comments:   Reason for Stopping:

## 2018-03-24 NOTE — PROGRESS NOTES
3/24/2018      RE: Brittany Shin      To Whom it May Concern: This is to certify that Brittany Shin may may return to work on 3/30/18 with light activity. Please excuse her off work from 3/20/18. Please feel free to contact my office if you have any questions or concerns. Thank you for your assistance in this matter.     Sincerely,      Noe Rollins MD

## 2018-03-24 NOTE — DISCHARGE INSTRUCTIONS
Patient armband removed and shredded       Cholecystectomy: What to Expect at [address] [place] [address]  After your surgery, it is normal to feel weak and tired for several days after you return home. Your belly may be swollen. If you had laparoscopic surgery, you may also have pain in your shoulder for about 24 hours. You may have gas or need to burp a lot at first, and a few people get diarrhea. The diarrhea usually goes away in 2 to 4 weeks, but it may last longer. How quickly you recover depends on whether you had a laparoscopic or open surgery. · For a laparoscopic surgery, most people can go back to work or their normal routine in 1 to 2 weeks, but it may take longer, depending on the type of work you do. · For an open surgery, it will probably take 4 to 6 weeks before you get back to your normal routine. This care sheet gives you a general idea about how long it will take for you to recover. However, each person recovers at a different pace. Follow the steps below to get better as quickly as possible. How can you care for yourself at home? Activity  ? · Rest when you feel tired. Getting enough sleep will help you recover. ? · Try to walk each day. Start out by walking a little more than you did the day before. Gradually increase the amount you walk. Walking boosts blood flow and helps prevent pneumonia and constipation. ? · For about 2 to 4 weeks, avoid lifting anything that would make you strain. This may include a child, heavy grocery bags and milk containers, a heavy briefcase or backpack, cat litter or dog food bags, or a vacuum . ? · Avoid strenuous activities, such as biking, jogging, weightlifting, and aerobic exercise, until your doctor says it is okay. ? · You may shower 24 to 48 hours after surgery, if your doctor okays it. Pat the cut (incision) dry. Do not take a bath for the first 2 weeks, or until your doctor tells you it is okay.    ? · You may drive when you are no longer taking pain medicine and can quickly move your foot from the gas pedal to the brake. You must also be able to sit comfortably for a long period of time, even if you do not plan to go far. You might get caught in traffic. ? · For a laparoscopic surgery, most people can go back to work or their normal routine in 1 to 2 weeks, but it may take longer. For an open surgery, it will probably take 4 to 6 weeks before you get back to your normal routine. ? · Your doctor will tell you when you can have sex again. ? Diet  ? · Eat smaller meals more often instead of fewer larger meals. You can eat a normal diet, but avoid eating fatty foods for about 1 month. Fatty foods include hamburger, whole milk, cheese, and many snack foods. If your stomach is upset, try bland, low-fat foods like plain rice, broiled chicken, toast, and yogurt. ? · Drink plenty of fluids (unless your doctor tells you not to). ? · If you have diarrhea, try avoiding spicy foods, dairy products, fatty foods, and alcohol. You can also watch to see if specific foods cause it, and stop eating them. If the diarrhea continues for more than 2 weeks, talk to your doctor. ? · You may notice that your bowel movements are not regular right after your surgery. This is common. Try to avoid constipation and straining with bowel movements. You may want to take a fiber supplement every day. If you have not had a bowel movement after a couple of days, ask your doctor about taking a mild laxative. Medicines  ? · Your doctor will tell you if and when you can restart your medicines. He or she will also give you instructions about taking any new medicines. ? · If you take blood thinners, such as warfarin (Coumadin), clopidogrel (Plavix), or aspirin, be sure to talk to your doctor. He or she will tell you if and when to start taking those medicines again. Make sure that you understand exactly what your doctor wants you to do.    ? · Take pain medicines exactly as directed. ¨ If the doctor gave you a prescription medicine for pain, take it as prescribed. ¨ If you are not taking a prescription pain medicine, take an over-the-counter medicine such as acetaminophen (Tylenol), ibuprofen (Advil, Motrin), or naproxen (Aleve). Read and follow all instructions on the label. ¨ Do not take two or more pain medicines at the same time unless the doctor told you to. Many pain medicines contain acetaminophen, which is Tylenol. Too much Tylenol can be harmful. ? · If you think your pain medicine is making you sick to your stomach:  ¨ Take your medicine after meals (unless your doctor tells you not to). ¨ Ask your doctor for a different pain medicine. ? · If your doctor prescribed antibiotics, take them as directed. Do not stop taking them just because you feel better. You need to take the full course of antibiotics. Incision care  ? · If you have strips of tape on the incision, or cut, leave the tape on for a week or until it falls off.   ? · After 24 to 48 hours, wash the area daily with warm, soapy water, and pat it dry. ? · You may have staples to hold the cut together. Keep them dry until your doctor takes them out. This is usually in 7 to 10 days. ? · Keep the area clean and dry. You may cover it with a gauze bandage if it weeps or rubs against clothing. Change the bandage every day. ?Ice  ? · To reduce swelling and pain, put ice or a cold pack on your belly for 10 to 20 minutes at a time. Do this every 1 to 2 hours. Put a thin cloth between the ice and your skin. Follow-up care is a key part of your treatment and safety. Be sure to make and go to all appointments, and call your doctor if you are having problems. It's also a good idea to know your test results and keep a list of the medicines you take. When should you call for help? Call 911 anytime you think you may need emergency care. For example, call if:  ? · You passed out (lost consciousness).    ? · You are short of breath. Hanny Minder ? Call your doctor now or seek immediate medical care if:  ? · You are sick to your stomach and cannot drink fluids. ? · You have pain that does not get better when you take your pain medicine. ? · You cannot pass stools or gas. ? · You have signs of infection, such as:  ¨ Increased pain, swelling, warmth, or redness. ¨ Red streaks leading from the incision. ¨ Pus draining from the incision. ¨ A fever. ? · Bright red blood has soaked through the bandage over your incision. ? · You have loose stitches, or your incision comes open. ? · You have signs of a blood clot in your leg (called a deep vein thrombosis), such as:  ¨ Pain in your calf, back of knee, thigh, or groin. ¨ Redness and swelling in your leg or groin. ? Watch closely for any changes in your health, and be sure to contact your doctor if you have any problems. Where can you learn more? Go to http://giselle-pradeep.info/. Enter [phone] [id number] in the search box to learn more about \"Cholecystectomy: What to Expect at Home. \"  Current as of: May 12, 2017  Content Version: 11.4  © 6168-4329 [employer]. Care instructions adapted under license by [employer] (which disclaims liability or warranty for this information). If you have questions about a medical condition or this instruction, always ask your healthcare professional. [place] [address] any warranty or liability for your use of this information. DISCHARGE SUMMARY from Nurse    PATIENT INSTRUCTIONS:    After general anesthesia or intravenous sedation, for 24 hours or while taking prescription Narcotics:  · Limit your activities  · Do not drive and operate hazardous machinery  · Do not make important personal or business decisions  · Do  not drink alcoholic beverages  · If you have not urinated within 8 hours after discharge, please contact your surgeon on call.     Report the following to your surgeon:  · Excessive pain, swelling, redness or odor of or around the surgical area  · Temperature over 100.5  · Nausea and vomiting lasting longer than 4 hours or if unable to take medications  · Any signs of decreased circulation or nerve impairment to extremity: change in color, persistent  numbness, tingling, coldness or increase pain  · Any questions    What to do at Home:  Recommended activity: Activity as tolerated,     If you experience any of the following symptoms SOB, ABD pain, please follow up with PCP, ER . *  Please give a list of your current medications to your Primary Care Provider. *  Please update this list whenever your medications are discontinued, doses are      changed, or new medications (including over-the-counter products) are added. *  Please carry medication information at all times in case of emergency situations. These are general instructions for a healthy lifestyle:    No smoking/ No tobacco products/ Avoid exposure to second hand smoke  Surgeon General's Warning:  Quitting smoking now greatly reduces serious risk to your health. Obesity, smoking, and sedentary lifestyle greatly increases your risk for illness    A healthy diet, regular physical exercise & weight monitoring are important for maintaining a healthy lifestyle    You may be retaining fluid if you have a history of heart failure or if you experience any of the following symptoms:  Weight gain of 3 pounds or more overnight or 5 pounds in a week, increased swelling in our hands or feet or shortness of breath while lying flat in bed. Please call your doctor as soon as you notice any of these symptoms; do not wait until your next office visit. Recognize signs and symptoms of STROKE:    F-face looks uneven    A-arms unable to move or move unevenly    S-speech slurred or non-existent    T-time-call 911 as soon as signs and symptoms begin-DO NOT go       Back to bed or wait to see if you get better-TIME IS BRAIN.     Warning Signs of HEART ATTACK     Call 911 if you have these symptoms:   Chest discomfort. Most heart attacks involve discomfort in the center of the chest that lasts more than a few minutes, or that goes away and comes back. It can feel like uncomfortable pressure, squeezing, fullness, or pain.  Discomfort in other areas of the upper body. Symptoms can include pain or discomfort in one or both arms, the back, neck, jaw, or stomach.  Shortness of breath with or without chest discomfort.  Other signs may include breaking out in a cold sweat, nausea, or lightheadedness. Don't wait more than five minutes to call 911 - MINUTES MATTER! Fast action can save your life. Calling 911 is almost always the fastest way to get lifesaving treatment. Emergency Medical Services staff can begin treatment when they arrive -- up to an hour sooner than if someone gets to the hospital by car. The discharge information has been reviewed with the patient. The patient verbalized understanding. Discharge medications reviewed with the patient and appropriate educational materials and side effects teaching were provided.   ___________________________________________________________________________________________________________________________________

## 2018-03-24 NOTE — ANESTHESIA POSTPROCEDURE EVALUATION
Post-Anesthesia Evaluation and Assessment    Patient: Felice Hart MRN: [id number]  SSN: [id number]    YOB: 1965  Age: 47 y.o. Sex: female       Cardiovascular Function/Vital Signs  Visit Vitals    BP (!) 166/99    Pulse (!) 109    Temp 37.1 °C (98.8 °F)    Resp 15    Ht 5' 4\" (1.626 m)    Wt 73.5 kg (162 lb 0.6 oz)    SpO2 100%    Breastfeeding No    BMI 27.81 kg/m2       Patient is status post general anesthesia for Procedure(s):  CHOLECYSTECTOMY LAPAROSCOPIC lysis of adhesions repair of umbilical hernia. Nausea/Vomiting: None    Postoperative hydration reviewed and adequate. Pain:  Pain Scale 1: Numeric (0 - 10) (03/23/18 1416)  Pain Intensity 1: 8 (03/23/18 1416)   Managed    Neurological Status:   Neuro (WDL): Within Defined Limits (03/23/18 1435)   At baseline    Mental Status and Level of Consciousness: Arousable    Pulmonary Status:   O2 Device: CO2 nasal cannula (03/23/18 1826)   Adequate oxygenation and airway patent    Complications related to anesthesia: None    Post-anesthesia assessment completed.  No concerns    Signed By: Marine Pulliam MD     March 23, 2018

## 2018-03-24 NOTE — OP NOTES
[facility]  OPERATIVE REPORT    Lincoln Hernandez  MR#: [id number]  : 1965  ACCOUNT #: [de-identified]   DATE OF SERVICE: 2018    PREOPERATIVE DIAGNOSES:   1.  Gallstone pancreatitis. 2.  Cholelithiasis. 3.  Choledocholithiasis, status post ERCP. 4.  Umbilical hernia. 5.  Status post open laparotomy and gastrostomy tube placement. POSTOPERATIVE DIAGNOSES:   1.  Gallstone pancreatitis. 2.  Cholelithiasis. 3.  Choledocholithiasis, status post ERCP. 4.  Umbilical hernia. 5.  Status post open laparotomy and gastrostomy tube placement. PROCEDURES PERFORMED:  1. Laparoscopic cholecystectomy (dome down technique). 2.  Laparoscopic lysis of adhesions. 3.  Laparoscopic umbilical hernia repair with permanent sutures. SURGEON:  Dr. Kait Logan. ANESTHETIC:  General endotracheal.    ASSISTANT:  Sunil Gaxiola. ESTIMATED BLOOD LOSS:  100 mL. SPECIMEN SUBMITTED:  Gallbladder and contents. IMPLANTS:  None. COMPLICATIONS:  None. INDICATION FOR PROCEDURE:  The patient is a very pleasant 48-year-old woman with a known history of cholelithiasis for at least one year's time. She was offered surgery at the [facility], however, was afraid and did not proceed to have this done. She was been admitted on 18 with a 3-day history of nausea, vomiting and abdominal pain. She had no fevers. No episodes of biliary obstructive symptoms. An ultrasound in the emergency room showed a 1 cm common bile duct, which apparently had been stable for some time. They did show evidence of possible stones within the common bile duct as well. She had a markedly elevated lipase of over 25,000. By the following day this drastically reduced, and her pain was much better.   She underwent uncomplicated ERCP where a sphincterotomy was done, and sludge and gravel was cleared from the common bile duct with the post procedure cholangiogram being normal.  She is now being taken to the operating room for a laparoscopic cholecystectomy. On physical exam, she has a very long midline incision as well as a depressed incision in the left upper quadrant indicative of a gastrostomy tube. Upon querying her about what the exact procedure was many years ago, one time she said it was for antiulcer disease, and the second time she said it was for bowel obstruction. Attempts to get the records from 2006 at [facility] were unsuccessful. Apparently, we were told they keep the records only for a 5-year period. In addition, she has also had an open appendectomy. On physical exam, however, she also did have a small umbilical hernia defect. The laparotomy scar curved to the left of her umbilicus. This was a long upper midline incision. DESCRIPTION OF PROCEDURE:  After consent was obtained, she was brought to the main operating room. She was continued on the intravenous antibiotics that were started by the medicine service. This included ciprofloxacin and Flagyl. Once on the operating room table, sequential compressive devices were applied and activated on her lower extremities and general endotracheal anesthesia was administered. Her abdomen was then prepped and draped in usual sterile fashion. An orogastric tube was inserted. Copious amounts of bile were suctioned by the anesthesiologist.  Her abdomen was then prepped and draped in the usual sterile fashion. A small amount of Exparel, long-acting liposomal Marcaine was infiltrated in the right upper quadrant. A small stab incision was made, and a Veress needle was inserted. Pneumoperitoneum to 15 mmHg was created. A 5 mm bladeless trocar was then inserted with Optiview guidance. Once within the abdomen, inspection showed no evidence of insertion trauma.   Under direct visualization, a second 5 mm trocar was placed a bit more laterally and inferiorly and using these 2 ports, electrified jasbir was used to take down copious adhesions of the anterior abdominal wall and the right upper quadrant. We were then able to place a 12 mm bladeless trocar through the umbilicus through the hernia defect itself. Additional omental adhesions were taken down, and we were able to put one additional 5 mm trocar in the left upper quadrant medially and slightly inferior to her old gastrostomy tube scar. The gallbladder did not appear to be acutely inflamed; however, it was white consistent with chronic disease. She did have omental adhesions to the gallbladder as well, which needed to be taken down with electrified cautery. The gallbladder was then put in cephalad stretch, and the hook cautery was used to open up medially and laterally. There was an odd appearance to where the duodenum was medially crossing more superior than I usually am accustomed to. Her cystic duct was found to be very thickened and widened. Because of the unusual anatomy, a decision was made to use a dome down technique where above the cystic duct the gallbladder was freed from the liver medially. We did encounter the cystic artery here, and it was clipped proximally twice, once distally and transected while a grasper was used to grab the edge of the right lobe of the liver. The gallbladder dome was pulled down and using a cautery it was  from the remainder of the liver until we were down to the cystic duct, which as mentioned, was very widened. 2-0 PDS Endoloops were then placed around the base of the cystic duct. A third was placed a bit more distally, and the gallbladder was then transected with the scissors. It was placed within an Endobag and removed via the 12 mm port site. During the case, we used a 10 mm 30 degree laparoscope and then we switched back to the 5 mm scope to watch the extraction. The skin incision needed to be extended a little bit, and Pean clamps were used to spread the fascia as well to allow complete extraction.   The gallbladder itself did contain palpable gallstones. It was submitted in formalin for pathological permanent sectioning. Next, the patient was then taken out of reverse Trendelenburg and using a laparoscopic suturing device with 0 Ethibond suture, the fascial opening below the 12 mm trocar at the site of the umbilical hernia defect was closed with 2 interrupted zero Ethibond sutures. These, however, were not tied, and the 12 mm trocar was reinserted. The patient was placed back in a steep reverse Trendelenburg position, and copious irrigation and inspection was done at the bed of the liver. There was no bleeding. All the irrigant was suctioned out. She was flattened and additional irrigant was suctioned out from the lateral side of the liver. We then removed the scope and the trocars, tied the fascial stitches. The remainder of the Exparel was infiltrated around this umbilical   site. Pneumoperitoneum was then released by the remaining three 5 mm trocar sites. They too were removed. All subcutaneous tissue was irrigated. All skin incisions closed with 4-0 Monocryl suture, and Dermabond was used to dress the incisions. The patient was awakened from anesthesia, extubated, and brought to recovery room in excellent condition with no known complications. Blood loss was again felt to be about 100 mL. Several pictures were taken during the case and placed in the chart.       MD ANIRUDH Perez / [place]  D: 03/23/2018 18:22     T: 03/24/2018 04:42  JOB #: [id number]  CC: Nataliya Shelley MD  CC: 1 [employer]

## 2018-03-24 NOTE — PROGRESS NOTES
[email or web address]  [phone]       Impression  1. Gall stone pancreatitis-s/p ERCP and cholecystectomy. 3/20/18- Ct abdomen-   Acute pancreatitis. Chronic and stable mild dilation of the CBD for many years. Choledocholithiasis cannot be excluded by CT. Cholelithiasis and nonspecific gallbladder wall thickening.   -3/22/18- ERCP-  Small stone fragments/sludge removed w 12mm balloon after small BES. PD not injected. 3. Elevated LFTs-trending down-needs repeat in 2 weeks or so with PCP @ [facility]-if still not normal, may need further work up for intrinsic liver disease. 4. Gerd  5. H/O small bowel obstruction 2006  6. Colon cancer screening-colonoscopy with [facility]-normal per pt. Plan:  1. Stable for discharge from my GI perspective-timing of discharge deferred to primary team pending clearance of other medical issues. 2. Labs in 2 weeks or so with PCP. Will sign off-Thank you for this consultation and the opportunity to participate in the care of this patient. Please do not hesitate to call with any questions or concerns, or should event occur that may necessitate additional GI evaluation. Kely Palmer MD  [facility].  [email or web address]  Phone: [phone]  Pager: [phone]  Cell: [phone]. [email or web address]      Chief Complaint: abdominal pain    Subjective:  Pt uneventful overnight. Abdominal pain improved        Eyes: conjunctiva normal, EOM normal   ENT: Mucous membranes moist, no lesion or thrush   Cardiovascular:  normal rate and regular rhythm, no murmur   Pulmonary/Chest Wall: breath sounds normal and effort normal   Abdominal:  soft, non-acute, non-tender, no appreciable mass or hepatosplenomegaly, no appreciable ascites       Visit Vitals    BP (!) 186/113 (BP 1 Location: Left arm)  Comment: s/w [place]. She is aware of BP    Pulse (!) 107  Comment: s/w [place].  She is aware of pulse    Temp 99 °F (37.2 °C)    Resp 19    Ht 5' 4\" (1.626 m)    Wt 73.9 kg (162 lb 15.6 oz)    SpO2 98%    Breastfeeding No    BMI 27.98 kg/m2           Intake/Output Summary (Last 24 hours) at 03/24/18 1403  Last data filed at 03/24/18 0956   Gross per 24 hour   Intake             2120 ml   Output             1500 ml   Net              620 ml       CBC w/Diff    Lab Results   Component Value Date/Time    WBC 11.8 03/24/2018 02:46 AM    RBC 5.25 03/24/2018 02:46 AM    HGB 14.3 03/24/2018 02:46 AM    HCT 41.8 03/24/2018 02:46 AM    MCV 79.6 03/24/2018 02:46 AM    MCH 27.2 03/24/2018 02:46 AM    MCHC 34.2 03/24/2018 02:46 AM    RDW 14.1 03/24/2018 02:46 AM     03/24/2018 02:46 AM    Lab Results   Component Value Date/Time    GRANS 86 (H) 03/24/2018 02:46 AM    LYMPH 7 (L) 03/24/2018 02:46 AM    EOS 0 03/24/2018 02:46 AM    BANDS 0 05/17/2010 03:51 PM    BASOS 0 03/24/2018 02:46 AM    MYELO 0 05/17/2010 03:51 PM    METAS 0 05/17/2010 03:51 PM    PRO 0 05/17/2010 03:51 PM    BLAST 0 05/17/2010 03:51 PM      Basic Metabolic Profile   Recent Labs      03/24/18   0246   NA  135*   K  3.5   CL  101   CO2  26   BUN  4*   CA  9.5   MG  1.6        Hepatic Function    Lab Results   Component Value Date/Time    ALB 3.1 (L) 03/24/2018 02:46 AM    TP 7.9 03/24/2018 02:46 AM     (H) 03/24/2018 02:46 AM    Lab Results   Component Value Date/Time    SGOT 68 (H) 03/24/2018 02:46 AM          Farzana Walters MD  [facility] [place] [address]  [email or web address]

## 2018-03-24 NOTE — PROGRESS NOTES
At beginning of shift, nurses and charge nurse informed of respiratory's limited availability due to a call out - nurses asked to give treatments on the floors

## 2018-03-25 NOTE — DISCHARGE SUMMARY
[address] [facility] [address] [facility]  MR#: [id number]  : 1965  ACCOUNT #: [de-identified]   ADMIT DATE: 2018  DISCHARGE DATE: 2018    DISPOSITION:  Discharged to home. DISCHARGE CONDITION:  Stable. DISCHARGE DIAGNOSES:  1. Abdominal pain and nausea, now better. 2.  Gallstone pancreatitis, clinically resolved. 3.  Choledocholithiasis, status post endoscopic retrograde cholangiopancreatography and removal of stone. 4.  Cholelithiasis, status post laparoscopic cholecystectomy. 5.  Umbilical hernia, status post laparoscopic umbilical hernia repair with permanent sutures. 6.  History of open laparotomy and gastrotomy tube placement in the past, status post laparoscopic lysis of adhesions. 7.  Hypertension. 8.  Abnormal LFTs, improved. 9.  Hypokalemia and hypomagnesemia. 10.  Chronic intermittent mild asthma without any exacerbation. DISCHARGE MEDICATIONS:    1.  Norvasc 2.5 mg daily. The patient was advised not to take if systolic blood pressure is less than 120.  2.  Metoprolol 25 mg twice a day. Patient was advised not to take it if blood pressure is less than 110.  3.  Percocet 7.5 mg every 4 hours p.r.n. for pain. 4.  Zofran 8 mg q.8 hours p.r.n. for nausea. 5.  Protonix 40 mg daily. 6.  Vitamin B12 home dose daily. 7.  Advair Diskus 1 puff daily. 8.  albuterol inhaler daily as needed. 9.  Tylenol 500 mg q.6 hours p.r.n. for pain or fever. IMAGING AND PROCEDURES:  1. Chest x-ray was done at the time of admission, reported negative. 2.  CT abdomen and pelvis with contrast was done, reported acute pancreatitis, chronic and stable mild dilatation of CBD and cholelithiasis present. 3.  Ultrasound of the abdomen was done. 4. MRCP of the abdomen was done, reported cholelithiasis and mild common hepatic and bile duct dilatations. 5.  ERCP was done and stone removal was done.   6.  Status post laparoscopic cholecystectomy, adhesiolysis and umbilical hernia repair was done. SURGICAL CONSULTS:  1.  GI with Dr. Alfonso Caro. 2.  Surgery with Dr. Dontrell Hobson. HOSPITAL COURSE:  The patient was admitted to the hospital on 03/20/2018 with a complaint of abdominal pain and nausea. Please see per hospital admission H and P for further detail. The patient was found out to have very elevated lipase, as well as abnormal liver function tests. She had a CT scan and ultrasound of abdomen done. CT scan showed pancreatitis, which was thought secondary to gallstone. Her lipase was getting better with initial treatment of pancreatitis. Patient was seen by GI. Patient had MRCP done as patient has some dilated common bile duct. MRCP did not show any stone, however, ERCP was performed and the patient was found with a small stone, which was removed by Dr. Alfonso Caro. Afterward, the patient was started on liquid diet, which she was tolerating and then kept n.p.o. for a cholecystectomy. The patient was taken to the operative room on 03/23 and had laparoscopic cholecystectomy, adhesiolysis and repair of umbilical hernia was done. She was subsequently started on home medications, as well as a diet, which she was tolerating it. However, she had some hypertension and tachycardia without any sweating. She was getting better. She tolerated diet very well. She did not have any leukocytosis. When I saw this patient on the day of discharge, she was sitting in the chair and wanted to go home. She tolerated diet, had earlier some nausea, which got better. Initially, she had blood pressure reading high, was given some metoprolol and lisinopril with improvement in her blood pressure to 156/87 and heart rate of 86. Patient does not have any dizziness or any headache. She told me she has a blood pressure machine. Her  was at bedside. They wanted to take home as patient's surgeon, Dr. Dontrell Hobson, has also cleared her to go home.   Patient had hypokalemia and hypomagnesemia, which were repleted appropriately. Patient will be discharged home with above-mentioned medications. I have advised her to come back to the emergency room if her symptoms get worse and she verbalized understanding. I also advised her to check her blood pressure before taking her medication. She verbalized understanding about it. DISCHARGE INSTRUCTIONS:  1. Diet:  Cardiac diet. 2.  Activity:  As tolerated. 2.  Follow with the primary care physician in 5 days. 3.  Follow with Dr. Shellie Menendez as scheduled, otherwise in days. Total time is greater than 35 minutes.       Paulina Martinez MD       /SN  D: 03/24/2018 15:38     T: 03/24/2018 17:35  JOB #: [id number]  CC: LAUREN MELGAR DO  CC: Gideon Ormond MD  CC: Temi Sutton MD

## 2018-08-22 ENCOUNTER — HOSPITAL ENCOUNTER (EMERGENCY)
Age: 53
Discharge: HOME | End: 2018-08-22
Attending: EMERGENCY MEDICINE
Payer: OTHER GOVERNMENT

## 2018-08-22 ENCOUNTER — APPOINTMENT (OUTPATIENT)
Dept: GENERAL RADIOLOGY | Age: 53
End: 2018-08-22
Attending: EMERGENCY MEDICINE
Payer: OTHER GOVERNMENT

## 2018-08-22 VITALS
HEIGHT: 64 IN | TEMPERATURE: 98.1 F | WEIGHT: 176 LBS | OXYGEN SATURATION: 100 % | BODY MASS INDEX: 30.05 KG/M2 | RESPIRATION RATE: 16 BRPM | HEART RATE: 97 BPM | DIASTOLIC BLOOD PRESSURE: 90 MMHG | SYSTOLIC BLOOD PRESSURE: 143 MMHG

## 2018-08-22 DIAGNOSIS — J06.9 ACUTE UPPER RESPIRATORY INFECTION: Primary | ICD-10-CM

## 2018-08-22 PROCEDURE — 99282 EMERGENCY DEPT VISIT SF MDM: CPT

## 2018-08-22 PROCEDURE — 71046 X-RAY EXAM CHEST 2 VIEWS: CPT

## 2018-08-22 RX ORDER — HYDROCODONE POLISTIREX AND CHLORPHENIRAMINE POLISTIREX 10; 8 MG/5ML; MG/5ML
5 SUSPENSION, EXTENDED RELEASE ORAL
Qty: 60 ML | Refills: 0 | Status: SHIPPED | OUTPATIENT
Start: 2018-08-22 | End: 2019-08-03

## 2018-08-22 NOTE — ED NOTES
Current Discharge Medication List      START taking these medications    Details   chlorpheniramine-HYDROcodone (TUSSIONEX PENNKINETIC ER) 10-8 mg/5 mL suspension Take 5 mL by mouth every twelve (12) hours as needed for Cough. Max Daily Amount: 10 mL. Qty: 60 mL, Refills: 0    Associated Diagnoses: Acute upper respiratory infection         STOP taking these medications       oxyCODONE-acetaminophen (PERCOCET 7.5) 7.5-325 mg per tablet Comments:   Reason for Stopping:             Patient armband removed and shredded  Prescription given and reviewed with patient.

## 2018-08-22 NOTE — ED PROVIDER NOTES
EMERGENCY DEPARTMENT HISTORY AND PHYSICAL EXAM    9:05 AM      Date: 8/22/2018  Patient Name: Valery Nicole    History of Presenting Illness     Chief Complaint   Patient presents with    Nasal Congestion         History Provided By: Patient    Chief Complaint: Coughing  Duration:  3 days  Timing:  Worsening  Location: Generalized  Quality: NA  Severity: Moderate  Modifying Factors: Worse with exertion, unimproved by nebulizers, Tylenol, and Benadryl. Associated Symptoms: HA, wheezing, fever, pharyngitis      Additional History (Context): 9:06 AM Valery Nicole is a 46 y.o. female with h/o HTN, anemia, sickle cell trait, gal stones, GERD, and asthma who presents to ED complaining of worsening moderate generalized coughing onset 3 days ago. Sx worsened with exertion and unimproved by nebulizers, Tylenol, and Benadryl. Associated Sx include HA, wheezing, fever, and pharyngitis. Prior to Sx onset, pt was at [facility] for a week with her grandchildren. Denies any further complaints or symptoms at the moment. PCP: Barbi Levin DO        Current Outpatient Prescriptions   Medication Sig Dispense Refill    albuterol (PROVENTIL, VENTOLIN) 90 mcg/actuation inhaler Take 1-2 Puffs by inhalation every four (4) hours as needed for Wheezing. 1 g 3    oxyCODONE-acetaminophen (PERCOCET 7.5) 7.5-325 mg per tablet Take 1 Tab by mouth every four (4) hours as needed. Max Daily Amount: 6 Tabs. Indications: Pain 12 Tab 0    ondansetron (ZOFRAN ODT) 8 mg disintegrating tablet Take 1 Tab by mouth every eight (8) hours as needed for Nausea. 10 Tab 0    pantoprazole (PROTONIX) 40 mg tablet Take 1 Tab by mouth Daily (before breakfast). 7 Tab 0    amLODIPine (NORVASC) 2.5 mg tablet Take 1 Tab by mouth daily. Indications: do not bharati it if your upper blood pressure is less than 120 7 Tab 0    metoprolol tartrate (LOPRESSOR) 25 mg tablet Take 1 Tab by mouth every twelve (12) hours.  Indications: do not take it if your upper blood pressure is less than 110 20 Tab 0    fluticasone-salmeterol (ADVAIR DISKUS) 250-50 mcg/dose diskus inhaler Take 1 Puff by inhalation every twelve (12) hours.  CYANOCOBALAMIN, VITAMIN B-12, (VITAMIN B-12 PO) Take  by mouth. Past History     Past Medical History:  Past Medical History:   Diagnosis Date    Anemia     Asthma     BMI 34.0-34.9,adult 5/11/2017    Bowel obstruction (HCC)     Gall stones     GERD (gastroesophageal reflux disease)     History of blood transfusion     Hypertension     Sickle cell trait ([facility] [address]        Past Surgical History:  Past Surgical History:   Procedure Laterality Date    ABDOMEN SURGERY PROC UNLISTED  2006    surgery for bowel obstruction    HX APPENDECTOMY      HX TUBAL LIGATION         Family History:  Family History   Problem Relation Age of Onset    Cancer Mother     Cancer Brother        Social History:  Social History   Substance Use Topics    Smoking status: Never Smoker    Smokeless tobacco: Never Used    Alcohol use No       Allergies: Allergies   Allergen Reactions    Iron Anaphylaxis    Other Medication Nausea and Vomiting     All \"mycins\"    Penicillins Rash    Sulfa (Sulfonamide Antibiotics) Hives    Venofer [Iron Sucrose] Rash and Itching         Review of Systems       Review of Systems   Constitutional: Positive for fever. Negative for activity change and fatigue. HENT: Positive for sore throat. Negative for congestion and rhinorrhea. Eyes: Negative for visual disturbance. Respiratory: Positive for cough and wheezing. Negative for shortness of breath. Cardiovascular: Negative for chest pain and palpitations. Gastrointestinal: Negative for abdominal pain, diarrhea, nausea and vomiting. Genitourinary: Negative for dysuria and hematuria. Musculoskeletal: Negative for back pain. Skin: Negative for rash. Neurological: Positive for headaches. Negative for dizziness, weakness and light-headedness. All other systems reviewed and are negative. Physical Exam     Visit Vitals    /90 (BP 1 Location: Left arm, BP Patient Position: At rest)    Pulse 97    Temp 98.1 °F (36.7 °C)    Resp 16    Ht 5' 4\" (1.626 m)    Wt 79.8 kg (176 lb)    SpO2 100%    BMI 30.21 kg/m2         Physical Exam   Constitutional: She is oriented to person, place, and time. She appears well-developed and well-nourished. No distress. HENT:   Head: Normocephalic and atraumatic. Right Ear: External ear normal.   Left Ear: External ear normal.   Nose: Nose normal.   Mouth/Throat: Oropharynx is clear and moist.   Eyes: Conjunctivae and EOM are normal. Pupils are equal, round, and reactive to light. No scleral icterus. Neck: Normal range of motion. Neck supple. No JVD present. No tracheal deviation present. No thyromegaly present. Cardiovascular: Normal rate, regular rhythm, normal heart sounds and intact distal pulses. Exam reveals no gallop and no friction rub. No murmur heard. Pulmonary/Chest: Effort normal and breath sounds normal. She exhibits no tenderness. Dry cough present   Abdominal: Soft. Bowel sounds are normal. She exhibits no distension. There is no tenderness. There is no rebound and no guarding. Musculoskeletal: Normal range of motion. She exhibits no edema or tenderness. Lymphadenopathy:     She has no cervical adenopathy. Neurological: She is alert and oriented to person, place, and time. No cranial nerve deficit. Coordination normal.   No sensory loss, Gait normal, Motor 5/5   Skin: Skin is warm and dry. Psychiatric: She has a normal mood and affect. Her behavior is normal. Judgment and thought content normal.   Nursing note and vitals reviewed. Diagnostic Study Results     Labs -  No results found for this or any previous visit (from the past 12 hour(s)).     Radiologic Studies -   XR CHEST PA LAT    (Results Pending)   Preliminary read per ED Physician showed: No acute changes      Medical Decision Making   I am the first provider for this patient. I reviewed the vital signs, available nursing notes, past medical history, past surgical history, family history and social history. Vital Signs-Reviewed the patient's vital signs. Pulse Oximetry Analysis -  Patient is 100% O2 on RA, indicating adequate oxygenation. Cardiac Monitor:  Rate:  97 bpm    Records Reviewed: Nursing Notes and Old Medical Records (Time of Review: 9:05 AM)    ED Course: Progress Notes, Reevaluation, and Consults:      Provider Notes (Medical Decision Making):         Diagnosis     Clinical Impression: No diagnosis found. Disposition:     Follow-up Information     None           Patient's Medications   Start Taking    No medications on file   Continue Taking    ALBUTEROL (PROVENTIL, VENTOLIN) 90 MCG/ACTUATION INHALER    Take 1-2 Puffs by inhalation every four (4) hours as needed for Wheezing. AMLODIPINE (NORVASC) 2.5 MG TABLET    Take 1 Tab by mouth daily. Indications: do not bharati it if your upper blood pressure is less than 120    CYANOCOBALAMIN, VITAMIN B-12, (VITAMIN B-12 PO)    Take  by mouth. FLUTICASONE-SALMETEROL (ADVAIR DISKUS) 250-50 MCG/DOSE DISKUS INHALER    Take 1 Puff by inhalation every twelve (12) hours. METOPROLOL TARTRATE (LOPRESSOR) 25 MG TABLET    Take 1 Tab by mouth every twelve (12) hours. Indications: do not take it if your upper blood pressure is less than 110    ONDANSETRON (ZOFRAN ODT) 8 MG DISINTEGRATING TABLET    Take 1 Tab by mouth every eight (8) hours as needed for Nausea. OXYCODONE-ACETAMINOPHEN (PERCOCET 7.5) 7.5-325 MG PER TABLET    Take 1 Tab by mouth every four (4) hours as needed. Max Daily Amount: 6 Tabs. Indications: Pain    PANTOPRAZOLE (PROTONIX) 40 MG TABLET    Take 1 Tab by mouth Daily (before breakfast).    These Medications have changed    No medications on file   Stop Taking    ACETAMINOPHEN ([address] [facility]) 500 MG TABLET    Take  by mouth every six (6) hours as needed for Pain.     _______________________________    Attestations:  Scribe Attestation     Yamil Valdivia acting as a scribe for and in the presence of Merle Thomas MD      August 22, 2018 at 9:05 AM       Provider Attestation:      I personally performed the services described in the documentation, reviewed the documentation, as recorded by the scribe in my presence, and it accurately and completely records my words and actions. August 22, 2018 at 9:05 AM - Merle Thomas MD    _______________________________  Results reviewed with pt, she agrees with dispo and F/U plan.   Merle Thomas MD  9:28 AM

## 2018-08-22 NOTE — DISCHARGE INSTRUCTIONS

## 2018-08-22 NOTE — LETTER
[address] [place] [address] [facility] 
[address] [facility] [address] [phone] Work/School Note Date: 8/22/2018 To Whom It May concern: 
 
Francois Orellana was seen and treated today in the emergency room by the following provider(s): 
Attending Provider: Sergio Albarado MD. Francois Orellana may return to work on 08/23/18.  
 
Sincerely, 
 
 
 
 
Pari Moyer RN

## 2018-08-31 ENCOUNTER — OFFICE VISIT (OUTPATIENT)
Dept: ORTHOPEDIC SURGERY | Facility: CLINIC | Age: 53
End: 2018-08-31

## 2018-08-31 VITALS
HEIGHT: 64 IN | SYSTOLIC BLOOD PRESSURE: 122 MMHG | TEMPERATURE: 97.8 F | RESPIRATION RATE: 18 BRPM | WEIGHT: 198 LBS | OXYGEN SATURATION: 97 % | DIASTOLIC BLOOD PRESSURE: 88 MMHG | HEART RATE: 85 BPM | BODY MASS INDEX: 33.8 KG/M2

## 2018-08-31 DIAGNOSIS — M65.332 TRIGGER FINGER, LEFT MIDDLE FINGER: Primary | ICD-10-CM

## 2018-08-31 DIAGNOSIS — Z98.890 S/P TRIGGER FINGER RELEASE: ICD-10-CM

## 2018-08-31 RX ORDER — BETAMETHASONE SODIUM PHOSPHATE AND BETAMETHASONE ACETATE 3; 3 MG/ML; MG/ML
6 INJECTION, SUSPENSION INTRA-ARTICULAR; INTRALESIONAL; INTRAMUSCULAR; SOFT TISSUE ONCE
Qty: 0.5 ML | Refills: 0
Start: 2018-08-31 | End: 2018-08-31

## 2018-08-31 RX ORDER — BUPIVACAINE HYDROCHLORIDE 2.5 MG/ML
0.5 INJECTION, SOLUTION EPIDURAL; INFILTRATION; INTRACAUDAL ONCE
Qty: 0.5 ML | Refills: 0
Start: 2018-08-31 | End: 2018-08-31
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
## 2018-10-04 ENCOUNTER — HOSPITAL ENCOUNTER (EMERGENCY)
Age: 53
Discharge: HOME | End: 2018-10-04
Attending: EMERGENCY MEDICINE
Payer: OTHER GOVERNMENT

## 2018-10-04 VITALS
TEMPERATURE: 99 F | SYSTOLIC BLOOD PRESSURE: 141 MMHG | DIASTOLIC BLOOD PRESSURE: 86 MMHG | WEIGHT: 176 LBS | BODY MASS INDEX: 29.32 KG/M2 | HEART RATE: 87 BPM | HEIGHT: 65 IN | OXYGEN SATURATION: 96 % | RESPIRATION RATE: 18 BRPM

## 2018-10-04 DIAGNOSIS — M65.332 TRIGGER MIDDLE FINGER OF LEFT HAND: Primary | ICD-10-CM

## 2018-10-04 DIAGNOSIS — H66.90 EAR INFECTION: Primary | ICD-10-CM

## 2018-10-04 DIAGNOSIS — B02.9 HERPES ZOSTER WITHOUT COMPLICATION: ICD-10-CM

## 2018-10-04 DIAGNOSIS — Z01.812 BLOOD TESTS PRIOR TO TREATMENT OR PROCEDURE: ICD-10-CM

## 2018-10-04 PROCEDURE — 99281 EMR DPT VST MAYX REQ PHY/QHP: CPT

## 2018-10-04 RX ORDER — ACYCLOVIR 800 MG/1
800 TABLET ORAL
Qty: 35 TAB | Refills: 0 | Status: SHIPPED | OUTPATIENT
Start: 2018-10-04 | End: 2018-10-11

## 2018-10-04 RX ORDER — AZITHROMYCIN 250 MG/1
TABLET, FILM COATED ORAL
Qty: 6 TAB | Refills: 0 | Status: SHIPPED | OUTPATIENT
Start: 2018-10-04 | End: 2018-10-09

## 2018-10-04 NOTE — ED NOTES
Attending attestation of a face to face patient encounter: 
12:25PM 
The patient presents with the following symptoms: left ear pain. My exam shows: skin rash/eruptions on face which is chronic condition (Sun allergy per pt and these lesions are consistent with that). Has multiple vesicles behind left ear, also on left face near the ear; similar vesicles on left chin area which is consistent with zoster. Rash/vesicles have no crossing of midline. No vesicles in left ear, but does has erythematous left external canal. 
Impression/plan: shingles outbreak, possible bacterial infection in ear; Sun allergy of skin. Give px's acyclovir and zpak. Dermatologist referral. 
 
I have reviewed the chart, personally evaluated the patient, and discussed with the NATE our assessment and plan. I agree with the documentation recorded by the NATE, including the findings, treatment plan, and disposition.  
 
Kelvin Porter,

## 2018-10-04 NOTE — ED TRIAGE NOTES
Patient c/o left ear pain x 3 days. She states: \"I know I have a ear infection\". She states pain to left side of head.

## 2018-10-04 NOTE — ED PROVIDER NOTES
EMERGENCY DEPARTMENT HISTORY AND PHYSICAL EXAM 
 
Date: 10/4/2018 Patient Name: Loyd Bashir History of Presenting Illness Chief Complaint Patient presents with  Ear Pain History Provided By: Patient Chief Complaint: ear pain, rash Duration: 3 Days Timing:  Gradual 
Location: left ear Quality: Aching and Burning Severity: 9 out of 10 Modifying Factors: none Associated Symptoms: denies any other associated signs or symptoms HPI: Loyd Bashir is a 46 y.o. female with a PMH of asthma, gerd, anemia, sickle cell trait, HTN, gall stones who presents to the ER c/o left ear pain and rash. Patient states her symptoms began a few days ago and continue to worsen. She denied any fevers. She reports feeling like there was drainage in her ear as well. She has noted a small, vesicular-like rash in front of and behind her left ear. She reports a hx of zoster in the past as well. Patient denied all other symptoms or complaints. PCP: Estephania Arthur, DO 
 
Current Outpatient Prescriptions Medication Sig Dispense Refill  azithromycin (ZITHROMAX Z-CHARY) 250 mg tablet Take 2 tabs today, then 1 daily for total of 5 days 6 Tab 0  
 acyclovir (ZOVIRAX) 800 mg tablet Take 1 Tab by mouth five (5) times daily for 7 days. 35 Tab 0  chlorpheniramine-HYDROcodone (TUSSIONEX PENNKINETIC ER) 10-8 mg/5 mL suspension Take 5 mL by mouth every twelve (12) hours as needed for Cough. Max Daily Amount: 10 mL. 60 mL 0  
 ondansetron (ZOFRAN ODT) 8 mg disintegrating tablet Take 1 Tab by mouth every eight (8) hours as needed for Nausea. 10 Tab 0  
 pantoprazole (PROTONIX) 40 mg tablet Take 1 Tab by mouth Daily (before breakfast). 7 Tab 0  
 fluticasone-salmeterol (ADVAIR DISKUS) 250-50 mcg/dose diskus inhaler Take 1 Puff by inhalation every twelve (12) hours.  CYANOCOBALAMIN, VITAMIN B-12, (VITAMIN B-12 PO) Take  by mouth.  albuterol (PROVENTIL, VENTOLIN) 90 mcg/actuation inhaler Take 1-2 Puffs by inhalation every four (4) hours as needed for Wheezing. 1 g 3 Past History Past Medical History: 
Past Medical History:  
Diagnosis Date  Anemia  Asthma  BMI 34.0-34.9,adult 5/11/2017  Bowel obstruction ([facility] [address]  Gall stones  GERD (gastroesophageal reflux disease)  History of blood transfusion  Hypertension  Sickle cell trait ([facility] [address] Past Surgical History: 
Past Surgical History:  
Procedure Laterality Date [address] [place] [address] UNLISTED  2006  
 surgery for bowel obstruction  HX APPENDECTOMY  HX CHOLECYSTECTOMY  HX HERNIA REPAIR    
 HX TUBAL LIGATION Family History: 
Family History Problem Relation Age of Onset  Cancer Mother  Cancer Brother Social History: 
Social History Substance Use Topics  Smoking status: Never Smoker  Smokeless tobacco: Never Used  Alcohol use No  
 
 
Allergies: Allergies Allergen Reactions  Iron Anaphylaxis  Other Medication Nausea and Vomiting All \"mycins\"  Penicillins Rash  Sulfa (Sulfonamide Antibiotics) Hives  Venofer [Iron Sucrose] Rash and Itching Review of Systems Review of Systems Constitutional: Negative for chills, fatigue and fever. HENT: Positive for ear pain. Negative for sore throat. Eyes: Negative. Respiratory: Negative for cough and shortness of breath. Cardiovascular: Negative for chest pain and palpitations. Gastrointestinal: Negative for abdominal pain, nausea and vomiting. Genitourinary: Negative for dysuria. Musculoskeletal: Negative. Skin: Positive for rash. Neurological: Negative for dizziness, weakness, light-headedness and headaches. Psychiatric/Behavioral: Negative. All other systems reviewed and are negative. Physical Exam  
 
Vitals:  
 10/04/18 1116 BP: 141/86 Pulse: 87 Resp: 18 Temp: 99 °F (37.2 °C) SpO2: 96% Weight: 79.8 kg (176 lb) Height: 5' 5\" (1.651 m) Physical Exam  
Constitutional: She is oriented to person, place, and time. She appears well-developed and well-nourished. No distress. HENT:  
Head: Normocephalic and atraumatic. Right Ear: Tympanic membrane, external ear and ear canal normal.  
Left Ear: There is tenderness. Tympanic membrane is erythematous. Nose: Nose normal.  
Mouth/Throat: Uvula is midline, oropharynx is clear and moist and mucous membranes are normal.  
Erythema noted in left ear canal; zoster like rash noted posterior to left ear and just in front of tragus on left Eyes: Conjunctivae are normal. No scleral icterus. Neck: Neck supple. No JVD present. No tracheal deviation present. Cardiovascular: Normal rate, regular rhythm and normal heart sounds. Pulmonary/Chest: Effort normal and breath sounds normal. No respiratory distress. She has no wheezes. She has no rales. Abdominal: Soft. There is no tenderness. Musculoskeletal: Normal range of motion. Lymphadenopathy:  
  She has no cervical adenopathy. Neurological: She is alert and oriented to person, place, and time. She has normal strength. Gait normal. GCS eye subscore is 4. GCS verbal subscore is 5. GCS motor subscore is 6. Skin: Skin is warm and dry. She is not diaphoretic. Psychiatric: She has a normal mood and affect. Nursing note and vitals reviewed. Diagnostic Study Results Labs - No results found for this or any previous visit (from the past 12 hour(s)). Radiologic Studies - No orders to display CT Results  (Last 48 hours) None CXR Results  (Last 48 hours) None Medical Decision Making I am the first provider for this patient. I reviewed the vital signs, available nursing notes, past medical history, past surgical history, family history and social history. Vital Signs-Reviewed the patient's vital signs. Records Reviewed: Nursing Notes and Old Medical Records ED Course:  
12:56 PM 
45 y/o female c/o rash to face and left ear pain onset several days ago and worsening. States hx of ear infections in the past.  Also reports rash to area behind and in front of ear that has been draining. Denied any fevers or sick contacts. Has not taken anything for symptoms. Admits hx of shingles in past.  Based on PE, will tx for OM and zoster; pt given derm referral for sun exposed areas that she has had reaction to recently as well. All questions answered and patient in agreement with plan of care. Will plan for discharge. Johnny Lucio PA-C Disposition: 
Discharged DISCHARGE NOTE:  
 
  Care plan outlined and precautions discussed. Patient has no new complaints, changes, or physical findings. Results of n/a were reviewed with the patient. All medications were reviewed with the patient; will d/c home with acyclovir and azithromycin. All of pt's questions and concerns were addressed. Patient was instructed and agrees to follow up with pcp and dermatology, as well as to return to the ED upon further deterioration. Patient is ready to go home. Follow-up Information Follow up With Details Comments Contact Info [id number] [facility] EMERGENCY DEPT  If symptoms worsen [facility] [address] [id number] [phone] Dr. Radha Moore Call today dermatology appt Dr. Radha Moore, Dermatology [facility], [place], [address] Current Discharge Medication List  
  
START taking these medications Details  
azithromycin (ZITHROMAX Z-CHARY) 250 mg tablet Take 2 tabs today, then 1 daily for total of 5 days Qty: 6 Tab, Refills: 0  
  
acyclovir (ZOVIRAX) 800 mg tablet Take 1 Tab by mouth five (5) times daily for 7 days. Qty: 35 Tab, Refills: 0 CONTINUE these medications which have NOT CHANGED Details chlorpheniramine-HYDROcodone (TUSSIONEX PENNKINETIC ER) 10-8 mg/5 mL suspension Take 5 mL by mouth every twelve (12) hours as needed for Cough. Max Daily Amount: 10 mL. Qty: 60 mL, Refills: 0 Associated Diagnoses: Acute upper respiratory infection  
  
ondansetron (ZOFRAN ODT) 8 mg disintegrating tablet Take 1 Tab by mouth every eight (8) hours as needed for Nausea. Qty: 10 Tab, Refills: 0  
  
pantoprazole (PROTONIX) 40 mg tablet Take 1 Tab by mouth Daily (before breakfast). Qty: 7 Tab, Refills: 0  
  
fluticasone-salmeterol (ADVAIR DISKUS) 250-50 mcg/dose diskus inhaler Take 1 Puff by inhalation every twelve (12) hours. CYANOCOBALAMIN, VITAMIN B-12, (VITAMIN B-12 PO) Take  by mouth. albuterol (PROVENTIL, VENTOLIN) 90 mcg/actuation inhaler Take 1-2 Puffs by inhalation every four (4) hours as needed for Wheezing. Qty: 1 g, Refills: 3 Provider Notes (Medical Decision Making):  
 
Procedures: 
Procedures Diagnosis Clinical Impression: 1. Ear infection 2. Herpes zoster without complication

## 2018-10-29 ENCOUNTER — HOSPITAL ENCOUNTER (EMERGENCY)
Age: 53
Discharge: HOME | End: 2018-10-29
Attending: EMERGENCY MEDICINE | Admitting: EMERGENCY MEDICINE
Payer: OTHER GOVERNMENT

## 2018-10-29 ENCOUNTER — APPOINTMENT (OUTPATIENT)
Dept: GENERAL RADIOLOGY | Age: 53
End: 2018-10-29
Attending: EMERGENCY MEDICINE
Payer: OTHER GOVERNMENT

## 2018-10-29 VITALS
TEMPERATURE: 99 F | BODY MASS INDEX: 32.27 KG/M2 | SYSTOLIC BLOOD PRESSURE: 125 MMHG | RESPIRATION RATE: 18 BRPM | HEIGHT: 64 IN | HEART RATE: 93 BPM | DIASTOLIC BLOOD PRESSURE: 89 MMHG | WEIGHT: 189 LBS | OXYGEN SATURATION: 97 %

## 2018-10-29 DIAGNOSIS — M17.11 ARTHRITIS OF RIGHT KNEE: Primary | ICD-10-CM

## 2018-10-29 DIAGNOSIS — M25.561 RIGHT KNEE PAIN, UNSPECIFIED CHRONICITY: ICD-10-CM

## 2018-10-29 PROCEDURE — 73562 X-RAY EXAM OF KNEE 3: CPT

## 2018-10-29 PROCEDURE — 99282 EMERGENCY DEPT VISIT SF MDM: CPT

## 2018-10-29 PROCEDURE — 74011250636 HC RX REV CODE- 250/636: Performed by: PHYSICIAN ASSISTANT

## 2018-10-29 PROCEDURE — 96372 THER/PROPH/DIAG INJ SC/IM: CPT

## 2018-10-29 RX ORDER — KETOROLAC TROMETHAMINE 30 MG/ML
30 INJECTION, SOLUTION INTRAMUSCULAR; INTRAVENOUS
Status: COMPLETED | OUTPATIENT
Start: 2018-10-29 | End: 2018-10-29

## 2018-10-29 RX ORDER — NAPROXEN 500 MG/1
500 TABLET ORAL 2 TIMES DAILY WITH MEALS
Qty: 20 TAB | Refills: 0 | Status: SHIPPED | OUTPATIENT
Start: 2018-10-29 | End: 2019-06-02

## 2018-10-29 RX ADMIN — KETOROLAC TROMETHAMINE 30 MG: 30 INJECTION, SOLUTION INTRAMUSCULAR at 12:48

## 2018-10-29 NOTE — ED NOTES
Ted Vaughn is a 46 y.o. female that was discharged in good condition. The patients diagnosis, condition and treatment were explained to  patient and aftercare instructions were given. The patient verbalized understanding. Patient armband removed and shredded.

## 2018-10-29 NOTE — LETTER
NOTIFICATION RETURN TO WORK / SCHOOL 
 
10/29/2018 12:59 PM 
 
Ms. Eva Coronel [place] [id number] To Whom It May Concern: 
 
Eva Coronel is currently under the care of [id number] [place] [address] EMERGENCY DEPT. She will return to work/school on: 10/31/18 If there are questions or concerns please have the patient contact our office.  
 
 
 
Sincerely, 
 
 
ESTER Cotter

## 2018-10-29 NOTE — ED PROVIDER NOTES
EMERGENCY DEPARTMENT HISTORY AND PHYSICAL EXAM 
 
Date: 10/29/2018 Patient Name: Mary Juarez History of Presenting Illness Chief Complaint Patient presents with  Knee Pain  Knee Swelling History Provided By: Patient Chief Complaint:right knee pain and swelling Duration: 2 Days Timing:  Acute Location: right Quality: Aching Severity: 10 out of 10 Modifying Factors:  
Associated Symptoms: denies any other associated signs or symptoms Additional History (Context): Mary Juarez is a 46 y.o. female with history of  hypertension, asthma and GERD who presents to the ED with a complaint of right knee pain x2 days. PT state shahla has significantly worsened over the past 2 days but she has had this pain in the past.  PT thinks it might be realted to standing on her feet through out her career. PT is able to bear weight with pain. She has tried ice and tylenol with little relief. PCP: Josie Ken, DO 
 
Current Outpatient Medications Medication Sig Dispense Refill  naproxen (NAPROSYN) 500 mg tablet Take 1 Tab by mouth two (2) times daily (with meals). 20 Tab 0  chlorpheniramine-HYDROcodone (TUSSIONEX PENNKINETIC ER) 10-8 mg/5 mL suspension Take 5 mL by mouth every twelve (12) hours as needed for Cough. Max Daily Amount: 10 mL. 60 mL 0  
 ondansetron (ZOFRAN ODT) 8 mg disintegrating tablet Take 1 Tab by mouth every eight (8) hours as needed for Nausea. 10 Tab 0  
 pantoprazole (PROTONIX) 40 mg tablet Take 1 Tab by mouth Daily (before breakfast). 7 Tab 0  
 fluticasone-salmeterol (ADVAIR DISKUS) 250-50 mcg/dose diskus inhaler Take 1 Puff by inhalation every twelve (12) hours.  CYANOCOBALAMIN, VITAMIN B-12, (VITAMIN B-12 PO) Take  by mouth.  albuterol (PROVENTIL, VENTOLIN) 90 mcg/actuation inhaler Take 1-2 Puffs by inhalation every four (4) hours as needed for Wheezing. 1 g 3 Past History Past Medical History: Past Medical History:  
Diagnosis Date  Anemia  Asthma  BMI 34.0-34.9,adult 5/11/2017  Bowel obstruction ([facility] [address]  Gall stones  GERD (gastroesophageal reflux disease)  History of blood transfusion  Hypertension  Sickle cell trait ([facility] Past Surgical History: 
Past Surgical History:  
Procedure Laterality Date [address] UNLISTED  2006  
 surgery for bowel obstruction  HX APPENDECTOMY  HX CHOLECYSTECTOMY  HX HERNIA REPAIR    
 HX TUBAL LIGATION Family History: 
Family History Problem Relation Age of Onset  Cancer Mother  Cancer Brother Social History: 
Social History Tobacco Use  Smoking status: Never Smoker  Smokeless tobacco: Never Used Substance Use Topics  Alcohol use: No  
 Drug use: No  
 
 
Allergies: Allergies Allergen Reactions  Iron Anaphylaxis  Other Medication Nausea and Vomiting All \"mycins\"  Penicillins Rash  Sulfa (Sulfonamide Antibiotics) Hives  Venofer [Iron Sucrose] Rash and Itching Review of Systems Review of Systems Constitutional: Negative for fever. HENT: Negative for congestion. Eyes: Negative for pain. Respiratory: Negative for cough. Cardiovascular: Negative for chest pain. Gastrointestinal: Negative for abdominal pain, diarrhea, nausea and vomiting. Genitourinary: Negative for dysuria. Musculoskeletal: Positive for arthralgias, gait problem and myalgias. Skin: Negative for wound. Neurological: Negative for dizziness and headaches. All other systems reviewed and are negative. All Other Systems Negative Physical Exam  
 
Vitals:  
 10/29/18 1204 BP: 125/89 Pulse: 93 Resp: 18 Temp: 99 °F (37.2 °C) SpO2: 97% Weight: 85.7 kg (189 lb) Height: 5' 4\" (1.626 m) Physical Exam  
Constitutional: She appears well-developed and well-nourished. No distress. HENT:  
Head: Normocephalic and atraumatic.   
Nose: Nose normal.  
 Eyes: Conjunctivae are normal.  
Neck: Normal range of motion. Cardiovascular: Normal rate, regular rhythm and normal heart sounds. Pulmonary/Chest: Effort normal and breath sounds normal. No respiratory distress. Musculoskeletal:  
     Right knee: She exhibits decreased range of motion and effusion. She exhibits no ecchymosis, no deformity, no laceration, no erythema, normal alignment, no LCL laxity, normal patellar mobility, no bony tenderness, normal meniscus and no MCL laxity. No tenderness found. Neurological: She is alert. Skin: Skin is warm and dry. Psychiatric: She has a normal mood and affect. Her behavior is normal.  
Vitals reviewed. Diagnostic Study Results Labs - No results found for this or any previous visit (from the past 12 hour(s)). Radiologic Studies -  
XR KNEE RT 3 V Final Result CT Results  (Last 48 hours) None CXR Results  (Last 48 hours) None Medical Decision Making I am the first provider for this patient. I reviewed the vital signs, available nursing notes, past medical history, past surgical history, family history and social history. Vital Signs-Reviewed the patient's vital signs. Pulse Oximetry Analysis - 97% on RA Records Reviewed: Old Medical Records Procedures: 
Procedures Provider Notes (Medical Decision Making): XR Results (most recent): 
Results from Hospital Encounter encounter on 10/29/18 XR KNEE RT 3 V Narrative EXAM: XR KNEE RT 3 V 
 
INDICATION: 46 years Female. knee pain and swelling x 3 days. ADDITIONAL HISTORY: None, COMPARISON: No comparison study is available at the time of this dictation. TECHNIQUE: 3 views of the right knee. FINDINGS: 
Bone mineralization is mildly decreased. There is no acute fracture or 
dislocation. There is minimal tricompartment arthrosis characterized by joint 
space narrowing and small marginal osteophytes. No erosions or aggressive osseous lesions are seen. There is a small knee joint effusion. Impression IMPRESSION: 
1. Small nonspecific knee joint effusion. 2.  Minimal tricompartment degenerative change. WIll discharge with naproxen and have pt follow up with Ortho for additional therapies MED RECONCILIATION: 
No current facility-administered medications for this encounter. Current Outpatient Medications Medication Sig  
 naproxen (NAPROSYN) 500 mg tablet Take 1 Tab by mouth two (2) times daily (with meals).  chlorpheniramine-HYDROcodone (TUSSIONEX PENNKINETIC ER) 10-8 mg/5 mL suspension Take 5 mL by mouth every twelve (12) hours as needed for Cough. Max Daily Amount: 10 mL.  ondansetron (ZOFRAN ODT) 8 mg disintegrating tablet Take 1 Tab by mouth every eight (8) hours as needed for Nausea.  pantoprazole (PROTONIX) 40 mg tablet Take 1 Tab by mouth Daily (before breakfast).  fluticasone-salmeterol (ADVAIR DISKUS) 250-50 mcg/dose diskus inhaler Take 1 Puff by inhalation every twelve (12) hours.  CYANOCOBALAMIN, VITAMIN B-12, (VITAMIN B-12 PO) Take  by mouth.  albuterol (PROVENTIL, VENTOLIN) 90 mcg/actuation inhaler Take 1-2 Puffs by inhalation every four (4) hours as needed for Wheezing. Disposition: 
discharge DISCHARGE NOTE:  
 
Pt has been reexamined. Patient has no new complaints, changes, or physical findings. Care plan outlined and precautions discussed. Results of visit were reviewed with the patient. All medications were reviewed with the patient; will d/c home with Naproxen. All of pt's questions and concerns were addressed. Patient was instructed and agrees to follow up with Ortho, as well as to return to the ED upon further deterioration. Patient is ready to go home. Follow-up Information Follow up With Specialties Details Why Contact Info Dory Baldwin, DO Sports Medicine Call As needed, follow up [facility] [address] [place] [phone] [employer]. Call As needed, follow up [address] [facility], [address] [facility] 
[phone] [address] [facility] EMERGENCY DEPT Emergency Medicine  If symptoms worsen Shar[address] Olivo [id number] [phone] Current Discharge Medication List  
  
START taking these medications Details  
naproxen (NAPROSYN) 500 mg tablet Take 1 Tab by mouth two (2) times daily (with meals). Qty: 20 Tab, Refills: 0 Diagnosis Clinical Impression: 1. Arthritis of right knee 2. Right knee pain, unspecified chronicity

## 2018-10-29 NOTE — DISCHARGE INSTRUCTIONS
Take medications as prescribed  Add Tylenol for pain  FOllow up with Ortho   Arthritis: Care Instructions  Your Care Instructions  Arthritis, also called osteoarthritis, is a breakdown of the cartilage that cushions your joints. When the cartilage wears down, your bones rub against each other. This causes pain and stiffness. Many people have some arthritis as they age. Arthritis most often affects the joints of the spine, hands, hips, knees, or feet. You can take simple measures to protect your joints, ease your pain, and help you stay active. Follow-up care is a key part of your treatment and safety. Be sure to make and go to all appointments, and call your doctor if you are having problems. It's also a good idea to know your test results and keep a list of the medicines you take. How can you care for yourself at home? · Stay at a healthy weight. Being overweight puts extra strain on your joints. · Talk to your doctor or physical therapist about exercises that will help ease joint pain. ? Stretch. You may enjoy gentle forms of yoga to help keep your joints and muscles flexible. ? Walk instead of jog. Other types of exercise that are less stressful on the joints include riding a bicycle, swimming, [employer]ent[employer] chi, or water exercise. ? Lift weights. Strong muscles help reduce stress on your joints. Stronger thigh muscles, for example, take some of the stress off of the knees and hips. Learn the right way to lift weights so you do not make joint pain worse. · Take your medicines exactly as prescribed. Call your doctor if you think you are having a problem with your medicine. · Take pain medicines exactly as directed. ? If the doctor gave you a prescription medicine for pain, take it as prescribed. ? If you are not taking a prescription pain medicine, ask your doctor if you can take an over-the-counter medicine. · Use a cane, crutch, walker, or another device if you need help to get around.  These can help rest your joints. You also can use other things to make life easier, such as a higher toilet seat and padded handles on kitchen utensils. · Do not sit in low chairs, which can make it hard to get up. · Put heat or cold on your sore joints as needed. Use whichever helps you most. You also can take turns with hot and cold packs. ? Apply heat 2 or 3 times a day for 20 to 30 minutes--using a heating pad, hot shower, or hot pack--to relieve pain and stiffness. ? Put ice or a cold pack on your sore joint for 10 to 20 minutes at a time. Put a thin cloth between the ice and your skin. When should you call for help? Call your doctor now or seek immediate medical care if:    · You have sudden swelling, warmth, or pain in any joint.     · You have joint pain and a fever or rash.     · You have such bad pain that you cannot use a joint.    Watch closely for changes in your health, and be sure to contact your doctor if:    · You have mild joint symptoms that continue even with more than 6 weeks of care at home.     · You have stomach pain or other problems with your medicine. Where can you learn more? Go to http://giselle-pradeep.info/. Enter D592 in the search box to learn more about \"Arthritis: Care Instructions. \"  Current as of: June 11, 2018  Content Version: 11.8  © 0137-9378 [employer]. Care instructions adapted under license by [employer] (which disclaims liability or warranty for this information). If you have questions about a medical condition or this instruction, always ask your healthcare professional. [address] any warranty or liability for your use of this information.

## 2019-01-02 ENCOUNTER — HOSPITAL ENCOUNTER (OUTPATIENT)
Dept: PREADMISSION TESTING | Age: 54
Discharge: HOME | End: 2019-01-02
Payer: OTHER GOVERNMENT

## 2019-01-02 DIAGNOSIS — M65.332 TRIGGER MIDDLE FINGER OF LEFT HAND: ICD-10-CM

## 2019-01-02 DIAGNOSIS — Z01.812 BLOOD TESTS PRIOR TO TREATMENT OR PROCEDURE: ICD-10-CM

## 2019-01-02 LAB
ANION GAP SERPL CALC-SCNC: 8 MMOL/L (ref 3–18)
BUN SERPL-MCNC: 7 MG/DL (ref 7–18)
BUN/CREAT SERPL: 11 (ref 12–20)
CALCIUM SERPL-MCNC: 9.2 MG/DL (ref 8.5–10.1)
CHLORIDE SERPL-SCNC: 106 MMOL/L (ref 100–108)
CO2 SERPL-SCNC: 28 MMOL/L (ref 21–32)
CREAT SERPL-MCNC: 0.64 MG/DL (ref 0.6–1.3)
GLUCOSE SERPL-MCNC: 76 MG/DL (ref 74–99)
POTASSIUM SERPL-SCNC: 3.8 MMOL/L (ref 3.5–5.5)
SODIUM SERPL-SCNC: 142 MMOL/L (ref 136–145)

## 2019-01-02 PROCEDURE — 80048 BASIC METABOLIC PNL TOTAL CA: CPT

## 2019-01-02 PROCEDURE — 36415 COLL VENOUS BLD VENIPUNCTURE: CPT

## 2019-01-08 ENCOUNTER — OFFICE VISIT (OUTPATIENT)
Dept: ORTHOPEDIC SURGERY | Facility: CLINIC | Age: 54
End: 2019-01-08

## 2019-01-08 VITALS
HEIGHT: 64 IN | OXYGEN SATURATION: 99 % | RESPIRATION RATE: 16 BRPM | SYSTOLIC BLOOD PRESSURE: 130 MMHG | WEIGHT: 198 LBS | DIASTOLIC BLOOD PRESSURE: 85 MMHG | TEMPERATURE: 96.9 F | HEART RATE: 91 BPM | BODY MASS INDEX: 33.8 KG/M2

## 2019-01-08 DIAGNOSIS — Z01.818 PREOP GENERAL PHYSICAL EXAM: ICD-10-CM

## 2019-01-08 DIAGNOSIS — G89.18 POST-OPERATIVE PAIN: Primary | ICD-10-CM

## 2019-01-08 RX ORDER — ERGOCALCIFEROL 1.25 MG/1
CAPSULE ORAL
COMMUNITY
Start: 2018-10-15

## 2019-01-08 RX ORDER — HYDROXYCHLOROQUINE SULFATE 200 MG/1
200 TABLET, FILM COATED ORAL DAILY
COMMUNITY
Start: 2018-11-06

## 2019-01-08 RX ORDER — ALBUTEROL SULFATE 90 UG/1
AEROSOL, METERED RESPIRATORY (INHALATION)
COMMUNITY
Start: 2018-11-27 | End: 2022-02-06

## 2019-01-08 RX ORDER — IPRATROPIUM BROMIDE AND ALBUTEROL SULFATE 2.5; .5 MG/3ML; MG/3ML
SOLUTION RESPIRATORY (INHALATION)
COMMUNITY
Start: 2018-10-15

## 2019-01-08 RX ORDER — OXYCODONE AND ACETAMINOPHEN 5; 325 MG/1; MG/1
1 TABLET ORAL
Qty: 28 TAB | Refills: 0 | Status: SHIPPED | OUTPATIENT
Start: 2019-01-08 | End: 2019-07-27

## 2019-01-08 RX ORDER — BENZONATATE 100 MG/1
CAPSULE ORAL
COMMUNITY
Start: 2018-10-15

## 2019-01-08 NOTE — H&P
History and Physical    46 year obese AA female seen in preparation for left middle finger trigger release  Review of Systems   Constitutional: Positive for activity change (decreased use left middle finger secondary to triggering). Negative for fatigue and fever. HENT: Negative for ear pain. Eyes: Negative for pain. Respiratory: Negative for cough. Gastrointestinal: Negative for diarrhea and nausea. Endocrine: Negative. Genitourinary: Negative for flank pain. Musculoskeletal: Negative for myalgias. Skin: Negative. Allergic/Immunologic: Negative. Neurological: Negative for weakness. Hematological: Negative. Psychiatric/Behavioral: Negative. Physical Exam   Nursing note and vitals reviewed. Constitutional: She is oriented to person, place, and time. She appears well-developed and well-nourished. HENT:   Head: Normocephalic and atraumatic. Eyes: Pupils are equal, round, and reactive to light. Neck: Normal range of motion. Cardiovascular: Normal rate, regular rhythm, normal heart sounds and intact distal pulses. Pulmonary/Chest: Effort normal and breath sounds normal.   Musculoskeletal:        Hands:  Neurological: She is alert and oriented to person, place, and time. Skin: Skin is warm and dry. Psychiatric: She has a normal mood and affect.  Her behavior is normal. Judgment and thought content normal.     Left middle finger triggering    left middle finger trigger release

## 2019-01-18 ENCOUNTER — OFFICE VISIT (OUTPATIENT)
Dept: ORTHOPEDIC SURGERY | Facility: CLINIC | Age: 54
End: 2019-01-18

## 2019-01-18 VITALS
OXYGEN SATURATION: 99 % | HEIGHT: 64 IN | TEMPERATURE: 97.1 F | DIASTOLIC BLOOD PRESSURE: 90 MMHG | BODY MASS INDEX: 33.8 KG/M2 | HEART RATE: 88 BPM | RESPIRATION RATE: 16 BRPM | WEIGHT: 198 LBS | SYSTOLIC BLOOD PRESSURE: 132 MMHG

## 2019-01-18 DIAGNOSIS — G89.18 POST-OPERATIVE PAIN: Primary | ICD-10-CM

## 2019-01-18 DIAGNOSIS — M65.332 TRIGGER FINGER, LEFT MIDDLE FINGER: ICD-10-CM

## 2019-01-18 DIAGNOSIS — Z48.89 ENCOUNTER FOR POSTOPERATIVE WOUND CHECK: ICD-10-CM

## 2019-01-18 DIAGNOSIS — Z98.890 S/P TRIGGER FINGER RELEASE: ICD-10-CM

## 2019-01-18 RX ORDER — IBUPROFEN 800 MG/1
800 TABLET ORAL
Qty: 60 TAB | Refills: 0 | Status: SHIPPED | OUTPATIENT
Start: 2019-01-18 | End: 2019-07-27

## 2019-01-18 NOTE — PROGRESS NOTES
HISTORY:  [place] Friday returns 2 days status post her left hand long finger open trigger release. Surgical site has been maintained appropriately. EXAMINATION:  The volar aspect of the left hand, 2-cm proximal to the long finger metacarpophalangeal joint reveals a 1.5-cm  transverse surgical incision intact with 3 interrupted vertical mattress sutures. Wound border is well approximated. No evidence of wound dehiscence or infection. There is soft tissue swelling bracketing the surgical site. Patient lacks at the long finger MCPJ 10° to full extension. She has a 4-cm palm-to-pulp  deficit  on flexion. There is little dull sensation to light touch is still present in the volar aspect of the distal tip of the long finger of the left hand. Today, a knuckle Band-Aid placed over the surgical site, patient will change daily. PLAN:  She had some difficulties with Percocet and we are going to discontinue that. In place, she was given a prescription for Motrin 800 mg to use up to 3 times a day with food. We will see her back in a week for wound check and likely suture removal.  All questions answered to her satisfaction today.

## 2019-01-23 ENCOUNTER — OFFICE VISIT (OUTPATIENT)
Dept: ORTHOPEDIC SURGERY | Facility: CLINIC | Age: 54
End: 2019-01-23

## 2019-01-23 VITALS
WEIGHT: 198.2 LBS | DIASTOLIC BLOOD PRESSURE: 73 MMHG | BODY MASS INDEX: 33.84 KG/M2 | SYSTOLIC BLOOD PRESSURE: 126 MMHG | HEIGHT: 64 IN | OXYGEN SATURATION: 100 % | HEART RATE: 78 BPM | TEMPERATURE: 96.9 F | RESPIRATION RATE: 18 BRPM

## 2019-01-23 DIAGNOSIS — G89.18 POST-OPERATIVE PAIN: Primary | ICD-10-CM

## 2019-01-23 DIAGNOSIS — Z48.02 ENCOUNTER FOR REMOVAL OF SUTURES: ICD-10-CM

## 2019-01-23 DIAGNOSIS — Z98.890 S/P TRIGGER FINGER RELEASE: ICD-10-CM

## 2019-01-23 NOTE — PROGRESS NOTES
HISTORY OF PRESENT ILLNESS:  Tracie Minor returns. She is now one-week status post her left hand trigger release. PHYSICAL EXAM:  Her surgical site has matured nicely over the volar aspect of the left hand. PROCEDURE:  Five interrupted vertical mattress sutures were removed today with no complications. A sterile Band-Aid was placed without any complications. PLAN:   The patient may wash the hand normally. She will avoid any prolonged soaking. We are going to see her back on a prn basis. There is no need for occupational therapy at this time. All her questions were answered to her satisfaction today.

## 2019-01-23 NOTE — LETTER
NOTIFICATION RETURN TO WORK / SCHOOL 
 
1/23/2019 8:51 AM 
 
Ms. Latesha Hutson [facility] [address] [id number] To Whom It May Concern: 
 
Latesha Hutson is currently under the care of [address] [place] [address]. She will return to work on: Monday, January 28 th, 2019. She was in care of Yariel Mari from Jan. 16 th-27 th, 2019. If there are questions or concerns please have the patient contact our office.  
 
 
 
Sincerely, 
 
 
Raz Hobson PA-C

## 2019-02-21 ENCOUNTER — OFFICE VISIT (OUTPATIENT)
Dept: ORTHOPEDIC SURGERY | Facility: CLINIC | Age: 54
End: 2019-02-21

## 2019-02-21 VITALS
WEIGHT: 199.6 LBS | DIASTOLIC BLOOD PRESSURE: 91 MMHG | HEIGHT: 64 IN | RESPIRATION RATE: 18 BRPM | BODY MASS INDEX: 34.08 KG/M2 | TEMPERATURE: 97.1 F | HEART RATE: 92 BPM | OXYGEN SATURATION: 97 % | SYSTOLIC BLOOD PRESSURE: 130 MMHG

## 2019-02-21 DIAGNOSIS — M25.661 DECREASED RANGE OF MOTION (ROM) OF RIGHT KNEE: ICD-10-CM

## 2019-02-21 DIAGNOSIS — M79.604 PAIN OF RIGHT LOWER EXTREMITY: ICD-10-CM

## 2019-02-21 DIAGNOSIS — M17.11 ARTHRITIS OF RIGHT KNEE: ICD-10-CM

## 2019-02-21 DIAGNOSIS — M25.40 EFFUSION INTO JOINT: ICD-10-CM

## 2019-02-21 DIAGNOSIS — M25.561 ACUTE PAIN OF RIGHT KNEE: Primary | ICD-10-CM

## 2019-02-21 DIAGNOSIS — M25.461 SWELLING OF JOINT, KNEE, RIGHT: ICD-10-CM

## 2019-02-21 RX ORDER — DICLOFENAC SODIUM 75 MG/1
75 TABLET, DELAYED RELEASE ORAL 2 TIMES DAILY
Qty: 60 TAB | Refills: 0 | Status: SHIPPED | OUTPATIENT
Start: 2019-02-21 | End: 2022-02-06

## 2019-02-21 RX ORDER — TRIAMCINOLONE ACETONIDE 40 MG/ML
40 INJECTION, SUSPENSION INTRA-ARTICULAR; INTRAMUSCULAR ONCE
Qty: 1 ML | Refills: 0
Start: 2019-02-21 | End: 2019-02-21

## 2019-02-21 RX ORDER — POTASSIUM CHLORIDE 20 MEQ/1
TABLET, EXTENDED RELEASE ORAL 2 TIMES DAILY
COMMUNITY

## 2019-02-21 NOTE — PROGRESS NOTES
HISTORY OF PRESENT ILLNESS:  Renée Silveira is a pleasant, 67-year-old, obese,  female who presents to the office with a several month history of worsening right knee pain. She denies any trauma to the knee. Her pain has limited her ability to stand for a short period and walk short distances. She has trouble bending her knee. She is rating her pain today at rest with her knee flexed to 90° as a 4-5/10 and with activity to include the aforementioned walking and standing, her pain increases to upwards of a 7-8/10. Basically, the pain has a dull, achy characteristic with occasional sharp stabbing sensation over the inner portion of her knee and directly under her kneecap. She was recently treated by our office for a trigger finger that was released under the care of Dr. Julio Lynne successfully. REVIEW OF SYSTEMS:  No chest pain. She does have exertional dyspnea, which is chronic in nature. No fevers, chills, or night sweats. 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 She occasionally uses an Albuterol inhaler for seasonal allergic rhinitis/asthma. She is taking Vitamin D currently, 50,000 units for a low serum Vitamin D level. She has had x-rays of the right knee dating back to October 2018, which indicated early tricompartmental osteoarthritis by report. ALLERGIES:  SULFA, PENICILLINS, MYCINS, IRON INFUSION. PHYSICAL EXAM:  She is a healthy-appearing, well-developed, well-nourished, pleasant, 67-year-old, obese,  female, atraumatic, normocephalic, alert and oriented times three, sitting on the table comfortably. She is in shorts. She is joined by her  today. I am joined by Haresh Norton LPN, as my chaperone. Examination of the right knee reveals a 1+ effusion. She has no warmth or erythema.   While supine, she has guarded range of motion barely at 80° of flexion actively with full extension. There is a negative Lachmans sign. There is a negative Barbie's sign. There is no calf tenderness or evidence of DVT. She does have crepitation throughout ranging. There is tenderness directly over the medial joint space, which worsens on varus stressing. RADIOGRAPHS:  X-rays, today, AP and tunnel with a lateral of the right knee, reveals moderate, medial joint space narrowing. There is also moderate patellofemoral osteoarthritis noted. The lateral joint space seems to be well-preserved. IMPRESSION:   
 
1. Right knee pain. 2. Right knee effusion. 3. Decreased range of motion of the right knee secondary to above. 4. Medial and patellofemoral osteoarthritis moderate in characteristic per imaging today. PROCEDURE:  Under sterile technique, after verbal and written consent were obtained and appropriate time out performed, with the patient lying supine with the knee flexed at 20° on a bolster and joined by Meenakshi Keith LPN, as chaperone, 3 cc of 1% Lidocaine was used to anesthetize the right knee using the superolateral intraarticular approach. To follow, 17 cc of straw-colored, blood-tinted aspirate was removed. To follow, 1 cc of Kenalog at 40 mg per mL mixed with 10 mL of Sensorcaine 0.75% was injected. PLAN:   I am currently recommending an aspiration and injection of her right knee today. Today, we discussed the alternative to using cortisone, as well as weight management to decrease axial loading of the right knee. That is to include, but cannot be limited to, exercise with elliptical, stationary or road bicycle, and/or swimming. She is to avoid any running or jumping outright. She may walk on the treadmill, but running was not recommended. I would like to start her on an oral NSAID in the form of Voltaren Enteric-coated 75 mg b.i.d. She is going to avoid taking any Motrin or similar product while on the Voltaren.   Today, her x-rays were reviewed, and all her questions were answered to her satisfaction.

## 2019-05-20 NOTE — LETTER
[address] [place] [address] [facility] 
[address] [facility] [id number] [phone] Work/School Note Date: 10/4/2018 To Whom It May concern: 
 
Gissell Lowe was seen and treated today in the emergency room by the following provider(s): 
Attending Provider: Karoon [employer] [place] DO Physician Assistant: Seniat Kelly PA-C. Gissell Lowe may return to work on 10/5/2018. Sincerely, Senait Kelly PA-C 
 
 
 
 Patient's daughter (Nancy) updated via phone by RN per patient request.

## 2019-06-02 ENCOUNTER — HOSPITAL ENCOUNTER (EMERGENCY)
Age: 54
Discharge: HOME | End: 2019-06-02
Attending: EMERGENCY MEDICINE
Payer: OTHER GOVERNMENT

## 2019-06-02 ENCOUNTER — APPOINTMENT (OUTPATIENT)
Dept: GENERAL RADIOLOGY | Age: 54
End: 2019-06-02
Attending: PHYSICIAN ASSISTANT
Payer: OTHER GOVERNMENT

## 2019-06-02 VITALS
OXYGEN SATURATION: 100 % | DIASTOLIC BLOOD PRESSURE: 87 MMHG | SYSTOLIC BLOOD PRESSURE: 137 MMHG | TEMPERATURE: 97.8 F | RESPIRATION RATE: 16 BRPM | HEART RATE: 82 BPM | BODY MASS INDEX: 30.05 KG/M2 | WEIGHT: 176 LBS | HEIGHT: 64 IN

## 2019-06-02 DIAGNOSIS — M79.672 LEFT FOOT PAIN: Primary | ICD-10-CM

## 2019-06-02 DIAGNOSIS — M79.672 PAIN OF LEFT HEEL: ICD-10-CM

## 2019-06-02 PROCEDURE — 73630 X-RAY EXAM OF FOOT: CPT

## 2019-06-02 PROCEDURE — 99281 EMR DPT VST MAYX REQ PHY/QHP: CPT

## 2019-06-02 RX ORDER — NAPROXEN 375 MG/1
375 TABLET ORAL 2 TIMES DAILY WITH MEALS
Qty: 20 TAB | Refills: 0 | Status: SHIPPED | OUTPATIENT
Start: 2019-06-02 | End: 2019-07-27

## 2019-06-02 RX ORDER — TRAMADOL HYDROCHLORIDE 50 MG/1
50 TABLET ORAL
Qty: 14 TAB | Refills: 0 | Status: SHIPPED | OUTPATIENT
Start: 2019-06-02 | End: 2019-06-05

## 2019-06-02 NOTE — DISCHARGE INSTRUCTIONS
Patient Education        Foot Pain: Care Instructions  Your Care Instructions  Foot injuries that cause pain and swelling are fairly common. Almost all sports or home repair projects can cause a misstep that ends up as foot pain. Normal wear and tear, especially as you get older, also can cause foot pain. Most minor foot injuries will heal on their own, and home treatment is usually all you need to do. If you have a severe injury, you may need tests and treatment. Follow-up care is a key part of your treatment and safety. Be sure to make and go to all appointments, and call your doctor if you are having problems. It's also a good idea to know your test results and keep a list of the medicines you take. How can you care for yourself at home? · Take pain medicines exactly as directed. ? If the doctor gave you a prescription medicine for pain, take it as prescribed. ? If you are not taking a prescription pain medicine, ask your doctor if you can take an over-the-counter medicine. · Rest and protect your foot. Take a break from any activity that may cause pain. · Put ice or a cold pack on your foot for 10 to 20 minutes at a time. Put a thin cloth between the ice and your skin. · Prop up the sore foot on a pillow when you ice it or anytime you sit or lie down during the next 3 days. Try to keep it above the level of your heart. This will help reduce swelling. · Your doctor may recommend that you wrap your foot with an elastic bandage. Keep your foot wrapped for as long as your doctor advises. · If your doctor recommends crutches, use them as directed. · Wear roomy footwear. · As soon as pain and swelling end, begin gentle exercises of your foot. Your doctor can tell you which exercises will help. When should you call for help? Call 911 anytime you think you may need emergency care.  For example, call if:    · Your foot turns pale, white, blue, or cold.    Call your doctor now or seek immediate medical care if:    · You cannot move or stand on your foot.     · Your foot looks twisted or out of its normal position.     · Your foot is not stable when you step down.     · You have signs of infection, such as:  ? Increased pain, swelling, warmth, or redness. ? Red streaks leading from the sore area. ? Pus draining from a place on your foot. ? A fever.     · Your foot is numb or tingly.    Watch closely for changes in your health, and be sure to contact your doctor if:    · You do not get better as expected.     · You have bruises from an injury that last longer than 2 weeks. Where can you learn more? Go to http://giselle-pradeep.info/. Enter V493 in the search box to learn more about \"Foot Pain: Care Instructions. \"  Current as of: September 20, 2018  Content Version: 11.9  © 0789-3768 [employer]. Care instructions adapted under license by [employer] (which disclaims liability or warranty for this information). If you have questions about a medical condition or this instruction, always ask your healthcare professional. [place] [address] any warranty or liability for your use of this information. Patient Education        Heel Pain: Care Instructions  Your Care Instructions  You can have heel pain from an injury or from everyday overuse, such as running or walking a lot. Plantar fasciitis is the most common cause of heel pain. In this condition, the bottom of your foot from the front of the heel to the base of the toes is sore and hard to walk on. Your heel can get better with rest, anti-inflammatory pain medicines, and stretching exercises. Follow-up care is a key part of your treatment and safety. Be sure to make and go to all appointments, and call your doctor if you are having problems. It's also a good idea to know your test results and keep a list of the medicines you take. How can you care for yourself at home? · Rest your feet often.  Reduce your activity to a level that lets you avoid pain. If possible, do not run or walk on hard surfaces. · Take anti-inflammatory medicines to reduce heel pain. These include ibuprofen (Advil, Motrin) and naproxen (Aleve). Read and follow all instructions on the label. · Put ice or a cold pack on your heel for 10 to 20 minutes at a time. Try to do this every 1 to 2 hours for the next 3 days (when you are awake). Put a thin cloth between the ice and your skin. · If ice isn't helping after 2 or 3 days, try heat, such as a heating pad set on low. · If your doctor says it is okay, try these calf stretches. Tight calf muscles can cause heel pain or make it worse. ? Stand about 1 foot from a wall. Place the palms of both hands against the wall at chest level and lean forward against the wall. Put the leg you want to stretch about a step behind your other leg. Keep your back heel on the floor and bend your front knee until you feel a stretch in the back leg. Hold this position for 15 to 30 seconds. Repeat the exercise 2 to 4 times a session. Do 3 to 4 sessions a day. ? Sit down on the floor or a mat with your feet stretched in front of you. Roll up a towel lengthwise, and loop it over the ball of your foot. Holding the towel at both ends, gently pull the towel toward you to stretch your foot. Hold this position for 15 to 30 seconds. Repeat the exercise 2 to 4 times a session. Do 3 to 4 sessions a day. · Wear a night splint if your doctor suggests it. A night splint holds your foot with the toes pointed up. This position gives the bottom of your foot a constant, gentle stretch. · Wear shoes with good arch support. Athletic shoes or shoes with a well-cushioned sole are good choices. · Try a heel lift, heel cup or shoe insert (orthotic) to help cushion your heel. You can buy these at many shoe stores. Use them in both shoes, even if only one foot hurts. · Maintain a healthy weight.  This puts less strain on your feet.  When should you call for help? Call your doctor now or seek immediate medical care if:    · You have heel pain with fever, redness, or warmth in your heel.     · You have numbness or tingling in your heel.    Watch closely for changes in your health, and be sure to contact your doctor if:    · You cannot put weight on the sore foot.     · Your heel pain lasts more than 2 weeks. Where can you learn more? Go to http://giselle-pradeep.info/. Enter S299 in the search box to learn more about \"Heel Pain: Care Instructions. \"  Current as of: 2018  Content Version: 11.9  © 6944-0361 [employer]. Care instructions adapted under license by [employer] (which disclaims liability or warranty for this information). If you have questions about a medical condition or this instruction, always ask your healthcare professional. [address] any warranty or liability for your use of this information. Quietyme Activation    Thank you for requesting access to Quietyme. Please follow the instructions below to securely access and download your online medical record. Quietyme allows you to send messages to your doctor, view your test results, renew your prescriptions, schedule appointments, and more. How Do I Sign Up? 1. In your internet browser, go to [email or web address]  2. Click on the First Time User? Click Here link in the Sign In box. You will be redirect to the New Member Sign Up page. 3. Enter your Quietyme Access Code exactly as it appears below. You will not need to use this code after youve completed the sign-up process. If you do not sign up before the expiration date, you must request a new code. Quietyme Access Code: [email or web address]  Expires: 2019 11:59 AM (This is the date your Quietyme access code will )    4.  Enter the last four digits of your Social Security Number (xxxx) and Date of Birth (mm/dd/yyyy) as indicated and click Submit. You will be taken to the next sign-up page. 5. Create a OurShelf ID. This will be your OurShelf login ID and cannot be changed, so think of one that is secure and easy to remember. 6. Create a OurShelf password. You can change your password at any time. 7. Enter your Password Reset Question and Answer. This can be used at a later time if you forget your password. 8. Enter your e-mail address. You will receive e-mail notification when new information is available in [address]. 9. Click Sign Up. You can now view and download portions of your medical record. 10. Click the Download Summary menu link to download a portable copy of your medical information. Additional Information    If you have questions, please visit the Frequently Asked Questions section of the OurShelf website at [email or web address] Remember, OurShelf is NOT to be used for urgent needs. For medical emergencies, dial 911. Complete all medications as prescribed. Follow-up with primary care doctor in 1 week. Return to the ED immediately for any new or worsening symptoms.

## 2019-06-02 NOTE — ED PROVIDER NOTES
EMERGENCY DEPARTMENT HISTORY AND PHYSICAL EXAM    Date: 6/2/2019  Patient Name: Anoop Arce    History of Presenting Illness     Chief Complaint   Patient presents with    Foot Pain         History Provided By:patient    Chief Complaint: foot pain  Duration: 3 weeks  Timing:  acute  Location: L heel  Quality:sharp pain  Severity: moderate  Modifying Factors: tylenol did not help, worse with weight bearing  Associated Symptoms:none      Additional History (Context): Anoop Arce is a 46 y.o. female with PMH anemia, asthma, htn, and GERD who presents with c/o atraumatic left foot pain isolated to the left heel for the past 3 weeks. Patient states she is on her feet a lot during the day at work and thinks this has exacerbated her pain. Patient states treatment with Tylenol has not relieved her sx, no other complaints at this time. PCP: Kim Berrios, DO    Current Outpatient Medications   Medication Sig Dispense Refill    naproxen (NAPROSYN) 375 mg tablet Take 1 Tab by mouth two (2) times daily (with meals). 20 Tab 0    traMADol (ULTRAM) 50 mg tablet Take 1 Tab by mouth every six (6) hours as needed for Pain for up to 3 days. Max Daily Amount: 200 mg. 14 Tab 0    potassium chloride (K-DUR, KLOR-CON) 20 mEq tablet Take  by mouth two (2) times a day.  diclofenac EC (VOLTAREN) 75 mg EC tablet Take 1 Tab by mouth two (2) times a day. 60 Tab 0    ibuprofen (MOTRIN) 800 mg tablet Take 1 Tab by mouth three (3) times daily (with meals). 60 Tab 0    ergocalciferol (ERGOCALCIFEROL) 50,000 unit capsule       albuterol-ipratropium (DUO-NEB) 2.5 mg-0.5 mg/3 ml nebu       PROAIR HFA 90 mcg/actuation inhaler       benzonatate (TESSALON) 100 mg capsule       chlorpheniramine-HYDROcodone (TUSSIONEX PENNKINETIC ER) 10-8 mg/5 mL suspension Take 5 mL by mouth every twelve (12) hours as needed for Cough. Max Daily Amount: 10 mL.  60 mL 0    ondansetron (ZOFRAN ODT) 8 mg disintegrating tablet Take 1 Tab by mouth every eight (8) hours as needed for Nausea. 10 Tab 0    pantoprazole (PROTONIX) 40 mg tablet Take 1 Tab by mouth Daily (before breakfast). 7 Tab 0    fluticasone-salmeterol (ADVAIR DISKUS) 250-50 mcg/dose diskus inhaler Take 1 Puff by inhalation every twelve (12) hours.  CYANOCOBALAMIN, VITAMIN B-12, (VITAMIN B-12 PO) Take  by mouth.  albuterol (PROVENTIL, VENTOLIN) 90 mcg/actuation inhaler Take 1-2 Puffs by inhalation every four (4) hours as needed for Wheezing. 1 g 3    hydroxychloroquine (PLAQUENIL) 200 mg tablet       oxyCODONE-acetaminophen (PERCOCET) 5-325 mg per tablet Take 1 Tab by mouth every four (4) hours as needed for Pain. Max Daily Amount: 6 Tabs. 28 Tab 0       Past History     Past Medical History:  Past Medical History:   Diagnosis Date    Anemia     Asthma     BMI 34.0-34.9,adult 5/11/2017    Bowel obstruction (HCC)     Gall stones     GERD (gastroesophageal reflux disease)     History of blood transfusion     Hypertension     [facility] [address]     Sickle cell trait ([facility] [address]        Past Surgical History:  Past Surgical History:   Procedure Laterality Date    ABDOMEN SURGERY PROC UNLISTED  2006    surgery for bowel obstruction    HX APPENDECTOMY      HX CHOLECYSTECTOMY      HX HERNIA REPAIR      HX TUBAL LIGATION         Family History:  Family History   Problem Relation Age of Onset    Cancer Mother     Cancer Brother        Social History:  Social History     Tobacco Use    Smoking status: Never Smoker    Smokeless tobacco: Never Used   Substance Use Topics    Alcohol use: No    Drug use: No       Allergies: Allergies   Allergen Reactions    Iron Anaphylaxis    Other Medication Nausea and Vomiting     All \"mycins\"    Penicillins Rash    Sulfa (Sulfonamide Antibiotics) Hives    Venofer [Iron Sucrose] Rash and Itching         Review of Systems   Review of Systems   Constitutional: Negative. Negative for chills and fever. HENT: Negative.   Negative for congestion, ear pain and rhinorrhea. Eyes: Negative. Negative for pain and redness. Respiratory: Negative. Negative for cough, shortness of breath, wheezing and stridor. Cardiovascular: Negative. Negative for chest pain and leg swelling. Gastrointestinal: Negative. Negative for abdominal pain, constipation, diarrhea, nausea and vomiting. Genitourinary: Negative. Negative for dysuria and frequency. Musculoskeletal: Positive for arthralgias. Negative for back pain and neck pain. Skin: Negative. Negative for rash and wound. Neurological: Negative. Negative for dizziness, seizures, syncope and headaches. All other systems reviewed and are negative. All Other Systems Negative  Physical Exam     Vitals:    06/02/19 1205   BP: 137/87   Pulse: 82   Resp: 16   Temp: 97.8 °F (36.6 °C)   SpO2: 100%   Weight: 79.8 kg (176 lb)   Height: 5' 4\" (1.626 m)     Physical Exam   Constitutional: She is oriented to person, place, and time. She appears well-developed and well-nourished. No distress. HENT:   Head: Normocephalic and atraumatic. Eyes: Conjunctivae are normal. Right eye exhibits no discharge. Left eye exhibits no discharge. No scleral icterus. Neck: Normal range of motion. Neck supple. Cardiovascular: Normal rate. Pulmonary/Chest: Effort normal. No stridor. No respiratory distress. Musculoskeletal: Normal range of motion. She exhibits tenderness. She exhibits no edema or deformity. LLE: intact pulses, no edema or deformity noted, ROM of all joints intact. TTP noted along the heel and arch of the foot. Neurological: She is alert and oriented to person, place, and time. Coordination normal.   Gait is steady. Able to ambulate without difficulty. Skin: Skin is warm and dry. No rash noted. She is not diaphoretic. No erythema. Psychiatric: She has a normal mood and affect. Her behavior is normal. Thought content normal.   Nursing note and vitals reviewed.              Diagnostic Study Results     Labs -   No results found for this or any previous visit (from the past 12 hour(s)). Radiologic Studies -   XR FOOT LT MIN 3 V    (Results Pending)   no acute process  CT Results  (Last 48 hours)    None        CXR Results  (Last 48 hours)    None            Medical Decision Making   I am the first provider for this patient. I reviewed the vital signs, available nursing notes, past medical history, past surgical history, family history and social history. Vital Signs-Reviewed the patient's vital signs. Records Reviewed:Roya Mcgovern PA-C   Procedures:  Procedures    Provider Notes (Medical Decision Making): Impression:  Foot pain, heel pain    X-rays negative for acute process, explained to the pt her sx could be related to fasciitis from standing on her feet for long periods of time, will plan to d/c with NSAIDs ultram and Podiatry follow-up. Pt agrees. Roya Moscoso PA-C 12:35 PM     MED RECONCILIATION:  No current facility-administered medications for this encounter. Current Outpatient Medications   Medication Sig    naproxen (NAPROSYN) 375 mg tablet Take 1 Tab by mouth two (2) times daily (with meals).  traMADol (ULTRAM) 50 mg tablet Take 1 Tab by mouth every six (6) hours as needed for Pain for up to 3 days. Max Daily Amount: 200 mg.  potassium chloride (K-DUR, KLOR-CON) 20 mEq tablet Take  by mouth two (2) times a day.  diclofenac EC (VOLTAREN) 75 mg EC tablet Take 1 Tab by mouth two (2) times a day.  ibuprofen (MOTRIN) 800 mg tablet Take 1 Tab by mouth three (3) times daily (with meals).  ergocalciferol (ERGOCALCIFEROL) 50,000 unit capsule     albuterol-ipratropium (DUO-NEB) 2.5 mg-0.5 mg/3 ml nebu     PROAIR HFA 90 mcg/actuation inhaler     benzonatate (TESSALON) 100 mg capsule     chlorpheniramine-HYDROcodone (TUSSIONEX PENNKINETIC ER) 10-8 mg/5 mL suspension Take 5 mL by mouth every twelve (12) hours as needed for Cough.  Max Daily Amount: 10 mL.    ondansetron (ZOFRAN ODT) 8 mg disintegrating tablet Take 1 Tab by mouth every eight (8) hours as needed for Nausea.  pantoprazole (PROTONIX) 40 mg tablet Take 1 Tab by mouth Daily (before breakfast).  fluticasone-salmeterol (ADVAIR DISKUS) 250-50 mcg/dose diskus inhaler Take 1 Puff by inhalation every twelve (12) hours.  CYANOCOBALAMIN, VITAMIN B-12, (VITAMIN B-12 PO) Take  by mouth.  albuterol (PROVENTIL, VENTOLIN) 90 mcg/actuation inhaler Take 1-2 Puffs by inhalation every four (4) hours as needed for Wheezing.  hydroxychloroquine (PLAQUENIL) 200 mg tablet     oxyCODONE-acetaminophen (PERCOCET) 5-325 mg per tablet Take 1 Tab by mouth every four (4) hours as needed for Pain. Max Daily Amount: 6 Tabs. Disposition:  D/c    DISCHARGE NOTE:   Patient is stable for discharge at this time. Rx for naproxen and ultram given. Rest and follow-up with Podiatry this week. Return to the ED immediately for any new or worsening sx. April Kisha Barnett PA-C 12:35 PM     Follow-up Information     Follow up With Specialties Details Why Contact Info    Jose G Khalil DPM Podiatry Schedule an appointment as soon as possible for a visit in 1 week  [address] [place] [id number] [phone] [address] [facility] EMERGENCY DEPT Emergency Medicine  As needed, If symptoms worsen [address] [facility]  [phone]          Current Discharge Medication List      START taking these medications    Details   traMADol (ULTRAM) 50 mg tablet Take 1 Tab by mouth every six (6) hours as needed for Pain for up to 3 days. Max Daily Amount: 200 mg. Qty: 14 Tab, Refills: 0    Associated Diagnoses: Left foot pain; Pain of left heel         CONTINUE these medications which have CHANGED    Details   naproxen (NAPROSYN) 375 mg tablet Take 1 Tab by mouth two (2) times daily (with meals). Qty: 20 Tab, Refills: 0               Diagnosis     Clinical Impression:   1. Left foot pain    2.  Pain of left heel

## 2019-06-02 NOTE — LETTER
NOTIFICATION OF RETURN TO WORK / SCHOOL 
6/2/2019 [facility] [place] [phone] To Whom It May Concern: 
 
Ras Franco was seen in the ED on 6/2/19 and may be excused from work for 3 days. Sincerely, Roya Moscoso PA-C

## 2019-06-02 NOTE — ED TRIAGE NOTES
Pt states that lt heel is painful and increase pain with standing or walking. Denies any injury to lt heel/. Pain began 3 weeks ago increasingly getting worse.  9/10

## 2019-06-02 NOTE — ED NOTES
Patient armband removed and shredded  Pt discharged home with after care instructions given to pt . Pt verbalizes understand of after care instructions.  Return to the ED for any worsening symptoms and follow with primary care doctor as directed   Cha Lou RN

## 2019-07-27 ENCOUNTER — APPOINTMENT (OUTPATIENT)
Dept: GENERAL RADIOLOGY | Age: 54
End: 2019-07-27
Attending: EMERGENCY MEDICINE
Payer: OTHER GOVERNMENT

## 2019-07-27 ENCOUNTER — HOSPITAL ENCOUNTER (EMERGENCY)
Age: 54
Discharge: HOME | End: 2019-07-27
Attending: EMERGENCY MEDICINE
Payer: OTHER GOVERNMENT

## 2019-07-27 VITALS
TEMPERATURE: 98.6 F | HEART RATE: 93 BPM | SYSTOLIC BLOOD PRESSURE: 131 MMHG | RESPIRATION RATE: 20 BRPM | DIASTOLIC BLOOD PRESSURE: 97 MMHG | WEIGHT: 180 LBS | OXYGEN SATURATION: 98 % | BODY MASS INDEX: 30.73 KG/M2 | HEIGHT: 64 IN

## 2019-07-27 DIAGNOSIS — M25.462 KNEE EFFUSION, LEFT: ICD-10-CM

## 2019-07-27 DIAGNOSIS — M25.562 ACUTE PAIN OF LEFT KNEE: Primary | ICD-10-CM

## 2019-07-27 PROCEDURE — 99282 EMERGENCY DEPT VISIT SF MDM: CPT

## 2019-07-27 PROCEDURE — 73564 X-RAY EXAM KNEE 4 OR MORE: CPT

## 2019-07-27 PROCEDURE — L1830 KO IMMOB CANVAS LONG PRE OTS: HCPCS

## 2019-07-27 RX ORDER — IBUPROFEN 800 MG/1
800 TABLET ORAL
Qty: 20 TAB | Refills: 0 | Status: SHIPPED | OUTPATIENT
Start: 2019-07-27 | End: 2019-08-03

## 2019-07-27 NOTE — ED NOTES
Lorne Smart is a 53 y.o. female that was discharged in good condition. The patients diagnosis, condition and treatment were explained to  patient and aftercare instructions were given. The patient verbalized understanding. Patient armband removed and shredded.

## 2019-07-27 NOTE — ED PROVIDER NOTES
EMERGENCY DEPARTMENT HISTORY AND PHYSICAL EXAM    11:05 AM      Date: 7/27/2019  Patient Name: Spencer Olsen    History of Presenting Illness     Chief Complaint   Patient presents with    Knee Pain         History Provided By: Patient    Chief Complaint: knee pain       Additional History (Context): Spencer Olsen is a 53 y.o. female with history of asthma, anemia, hypertension, lupus who presents with left knee pain for 5 days. She has been increasing her exercise routine and doing excessive stair exercises. She reports worsening pain over the past week but is worse with bending. Pain is currently 10 out of 10. Also hurts to put weight on that leg. She denies any fevers, chills or body aches. She feels like the knee is swollen. She sees Dr. David Mortensen for arthritis in her other knee. Davion Swartz PCP: Steven Reynolds, DO    Current Outpatient Medications   Medication Sig Dispense Refill    ibuprofen (MOTRIN) 800 mg tablet Take 1 Tab by mouth every six (6) hours as needed for Pain for up to 7 days. 20 Tab 0    potassium chloride (K-DUR, KLOR-CON) 20 mEq tablet Take  by mouth two (2) times a day.  diclofenac EC (VOLTAREN) 75 mg EC tablet Take 1 Tab by mouth two (2) times a day. 60 Tab 0    hydroxychloroquine (PLAQUENIL) 200 mg tablet       ergocalciferol (ERGOCALCIFEROL) 50,000 unit capsule       albuterol-ipratropium (DUO-NEB) 2.5 mg-0.5 mg/3 ml nebu       benzonatate (TESSALON) 100 mg capsule       chlorpheniramine-HYDROcodone (TUSSIONEX PENNKINETIC ER) 10-8 mg/5 mL suspension Take 5 mL by mouth every twelve (12) hours as needed for Cough. Max Daily Amount: 10 mL. 60 mL 0    ondansetron (ZOFRAN ODT) 8 mg disintegrating tablet Take 1 Tab by mouth every eight (8) hours as needed for Nausea. 10 Tab 0    pantoprazole (PROTONIX) 40 mg tablet Take 1 Tab by mouth Daily (before breakfast).  7 Tab 0    fluticasone-salmeterol (ADVAIR DISKUS) 250-50 mcg/dose diskus inhaler Take 1 Puff by inhalation every twelve (12) hours.  CYANOCOBALAMIN, VITAMIN B-12, (VITAMIN B-12 PO) Take  by mouth.  albuterol (PROVENTIL, VENTOLIN) 90 mcg/actuation inhaler Take 1-2 Puffs by inhalation every four (4) hours as needed for Wheezing. 1 g 3    PROAIR HFA 90 mcg/actuation inhaler          Past History     Past Medical History:  Past Medical History:   Diagnosis Date    Anemia     Asthma     BMI 34.0-34.9,adult 5/11/2017    Bowel obstruction (HCC)     Gall stones     GERD (gastroesophageal reflux disease)     History of blood transfusion     Hypertension     Lupus ([facility] [address]     Sickle cell trait ([facility] [address]        Past Surgical History:  Past Surgical History:   Procedure Laterality Date    ABDOMEN SURGERY PROC UNLISTED  2006    surgery for bowel obstruction    HX APPENDECTOMY      HX CHOLECYSTECTOMY      HX HERNIA REPAIR      HX TUBAL LIGATION         Family History:  Family History   Problem Relation Age of Onset    Cancer Mother     Cancer Brother        Social History:  Social History     Tobacco Use    Smoking status: Never Smoker    Smokeless tobacco: Never Used   Substance Use Topics    Alcohol use: No    Drug use: No       Allergies: Allergies   Allergen Reactions    Iron Anaphylaxis    Other Medication Nausea and Vomiting     All \"mycins\"    Penicillins Rash    Sulfa (Sulfonamide Antibiotics) Hives    Venofer [Iron Sucrose] Rash and Itching         Review of Systems       Review of Systems   Constitutional: Negative for fever. HENT: Negative for facial swelling. Eyes: Negative for visual disturbance. Respiratory: Negative for shortness of breath. Cardiovascular: Negative for chest pain. Gastrointestinal: Negative for abdominal pain. Genitourinary: Negative for dysuria. Musculoskeletal: Positive for arthralgias and joint swelling. Negative for neck pain. Skin: Negative for rash. Neurological: Negative for dizziness. Psychiatric/Behavioral: Negative for confusion.    All other systems reviewed and are negative. Physical Exam     Visit Vitals  BP (!) 131/97 (BP 1 Location: Left arm, BP Patient Position: Sitting)   Pulse 93   Temp 98.6 °F (37 °C)   Resp 20   Ht 5' 4\" (1.626 m)   Wt 81.6 kg (180 lb)   SpO2 98%   BMI 30.90 kg/m²         Physical Exam   Constitutional: She is oriented to person, place, and time. She appears well-developed and well-nourished. No distress. HENT:   Head: Normocephalic and atraumatic. Eyes: Conjunctivae are normal.   Neck: Normal range of motion. Cardiovascular: Normal rate and regular rhythm. Pulmonary/Chest: Effort normal.   Abdominal: She exhibits no distension. Musculoskeletal: Normal range of motion. Diffuse tenderness throughout anterior surface of left knee. No obvious swelling. Range of motion intact, no laxity. Neurological: She is alert and oriented to person, place, and time. Skin: Skin is warm and dry. She is not diaphoretic. No warmth or erythema   Psychiatric: She has a normal mood and affect. Nursing note and vitals reviewed. Diagnostic Study Results     Labs -  No results found for this or any previous visit (from the past 12 hour(s)). Radiologic Studies -   XR KNEE LT MIN 4 V   Final Result   IMPRESSION:   1. No acute fracture or dislocation. 2. Small suprapatellar joint effusion. Medical Decision Making   I am the first provider for this patient. I reviewed the vital signs, available nursing notes, past medical history, past surgical history, family history and social history. Vital Signs-Reviewed the patient's vital signs. Records Reviewed: Nursing Notes (Time of Review: 11:05 AM)    ED Course: Progress Notes, Reevaluation, and Consults:      Provider Notes (Medical Decision Making): MDM  Number of Diagnoses or Management Options  Acute pain of left knee:   Knee effusion, left:   Diagnosis management comments: Left knee pain after increased exercise. Gradual onset. No trauma. Knee effusion on x-ray likely cause of pain due to overuse. Discussed R ICE. Referred to Ortho. Discussed treatment plan, return precautions, symptomatic relief, and expected time to improvement. All questions answered. Patient is stable for discharge and outpatient management. Diagnosis     Clinical Impression:   1. Acute pain of left knee    2. Knee effusion, left        Disposition: Discharged      Follow-up Information     Follow up With Specialties Details Why Contact Info    Omar Moran MD Orthopedic Surgery Schedule an appointment as soon as possible for a visit  [facility] [address] [facility] [address] [phone] [id number] [facility] EMERGENCY DEPT Emergency Medicine  Immediately if symptoms worsen [address] [facility]  [phone]           Patient's Medications   Start Taking    IBUPROFEN (MOTRIN) 800 MG TABLET    Take 1 Tab by mouth every six (6) hours as needed for Pain for up to 7 days. Continue Taking    ALBUTEROL (PROVENTIL, VENTOLIN) 90 MCG/ACTUATION INHALER    Take 1-2 Puffs by inhalation every four (4) hours as needed for Wheezing. ALBUTEROL-IPRATROPIUM (DUO-NEB) 2.5 MG-0.5 MG/3 ML NEBU        BENZONATATE (TESSALON) 100 MG CAPSULE        CHLORPHENIRAMINE-HYDROCODONE (TUSSIONEX PENNKINETIC ER) 10-8 MG/5 ML SUSPENSION    Take 5 mL by mouth every twelve (12) hours as needed for Cough. Max Daily Amount: 10 mL. CYANOCOBALAMIN, VITAMIN B-12, (VITAMIN B-12 PO)    Take  by mouth. DICLOFENAC EC (VOLTAREN) 75 MG EC TABLET    Take 1 Tab by mouth two (2) times a day. ERGOCALCIFEROL (ERGOCALCIFEROL) 50,000 UNIT CAPSULE        FLUTICASONE-SALMETEROL (ADVAIR DISKUS) 250-50 MCG/DOSE DISKUS INHALER    Take 1 Puff by inhalation every twelve (12) hours.     HYDROXYCHLOROQUINE (PLAQUENIL) 200 MG TABLET        ONDANSETRON (ZOFRAN ODT) 8 MG DISINTEGRATING TABLET    Take 1 Tab by mouth every eight (8) hours as needed for Nausea. PANTOPRAZOLE (PROTONIX) 40 MG TABLET    Take 1 Tab by mouth Daily (before breakfast). POTASSIUM CHLORIDE (K-DUR, KLOR-CON) 20 MEQ TABLET    Take  by mouth two (2) times a day. PROAIR HFA 90 MCG/ACTUATION INHALER       These Medications have changed    No medications on file   Stop Taking    IBUPROFEN (MOTRIN) 800 MG TABLET    Take 1 Tab by mouth three (3) times daily (with meals). NAPROXEN (NAPROSYN) 375 MG TABLET    Take 1 Tab by mouth two (2) times daily (with meals). OXYCODONE-ACETAMINOPHEN (PERCOCET) 5-325 MG PER TABLET    Take 1 Tab by mouth every four (4) hours as needed for Pain. Max Daily Amount: 6 Tabs.     _______________________________    Attestations:  Scribe Attestation     Russell Ulrich PA-C acting as a scribe for and in the presence of Mak Shah PA-C      July 27, 2019 at 11:09 AM       Provider Attestation:      I personally performed the services described in the documentation, reviewed the documentation, as recorded by the scribe in my presence, and it accurately and completely records my words and actions.  July 27, 2019 at 11:09 AM - Mak Shah PA-C  _______________________________

## 2019-07-27 NOTE — DISCHARGE INSTRUCTIONS
Keep affected limb elevated as much as possible. Apply ice in 20 minute intervals throughout the day. Take NSAIDs such as tylenol or ibuprofin as directed for pain control. Do not take on an empty stomach. Narcotics cannot be taken while driving. Patient Education        Knee Pain or Injury: Care Instructions  Your Care Instructions    Injuries are a common cause of knee problems. Sudden (acute) injuries may be caused by a direct blow to the knee. They can also be caused by abnormal twisting, bending, or falling on the knee. Pain, bruising, or swelling may be severe, and may start within minutes of the injury. Overuse is another cause of knee pain. Other causes are climbing stairs, kneeling, and other activities that use the knee. Everyday wear and tear, especially as you get older, also can cause knee pain. Rest, along with home treatment, often relieves pain and allows your knee to heal. If you have a serious knee injury, you may need tests and treatment. Follow-up care is a key part of your treatment and safety. Be sure to make and go to all appointments, and call your doctor if you are having problems. It's also a good idea to know your test results and keep a list of the medicines you take. How can you care for yourself at home? · Be safe with medicines. Read and follow all instructions on the label. ? If the doctor gave you a prescription medicine for pain, take it as prescribed. ? If you are not taking a prescription pain medicine, ask your doctor if you can take an over-the-counter medicine. · Rest and protect your knee. Take a break from any activity that may cause pain. · Put ice or a cold pack on your knee for 10 to 20 minutes at a time. Put a thin cloth between the ice and your skin. · Prop up a sore knee on a pillow when you ice it or anytime you sit or lie down for the next 3 days. Try to keep it above the level of your heart. This will help reduce swelling.   · If your knee is not swollen, you can put moist heat, a heating pad, or a warm cloth on your knee. · If your doctor recommends an elastic bandage, sleeve, or other type of support for your knee, wear it as directed. · Follow your doctor's instructions about how much weight you can put on your leg. Use a cane, crutches, or a walker as instructed. · Follow your doctor's instructions about activity during your healing process. If you can do mild exercise, slowly increase your activity. · Reach and stay at a healthy weight. Extra weight can strain the joints, especially the knees and hips, and make the pain worse. Losing even a few pounds may help. When should you call for help? Call 911 anytime you think you may need emergency care. For example, call if:    · You have symptoms of a blood clot in your lung (called a pulmonary embolism). These may include:  ? Sudden chest pain. ? Trouble breathing. ? Coughing up blood.    Call your doctor now or seek immediate medical care if:    · You have severe or increasing pain.     · Your leg or foot turns cold or changes color.     · You cannot stand or put weight on your knee.     · Your knee looks twisted or bent out of shape.     · You cannot move your knee.     · You have signs of infection, such as:  ? Increased pain, swelling, warmth, or redness. ? Red streaks leading from the knee. ? Pus draining from a place on your knee. ? A fever.     · You have signs of a blood clot in your leg (called a deep vein thrombosis), such as:  ? Pain in your calf, back of the knee, thigh, or groin. ? Redness and swelling in your leg or groin.    Watch closely for changes in your health, and be sure to contact your doctor if:    · You have tingling, weakness, or numbness in your knee.     · You have any new symptoms, such as swelling.     · You have bruises from a knee injury that last longer than 2 weeks.     · You do not get better as expected. Where can you learn more?   Go to http://giselle-pradeep.info/. Enter K195 in the search box to learn more about \"Knee Pain or Injury: Care Instructions. \"  Current as of: September 23, 2018  Content Version: 12.1  © 9792-1722 [employer]. Care instructions adapted under license by [employer] (which disclaims liability or warranty for this information). If you have questions about a medical condition or this instruction, always ask your healthcare professional. [address] any warranty or liability for your use of this information.

## 2019-07-27 NOTE — ED TRIAGE NOTES
C/O left knee pain and swelling since Tues. Denies specific injury however reports recent increase in exercise.

## 2019-07-27 NOTE — LETTER
NOTIFICATION OF RETURN TO WORK / SCHOOL 
 
7/27/2019 Ms. Merlin Chino [facility] [phone] To Whom it may concern, Please excuse Merlin Chino from work 07/27/19 - 7/30/19 for medical reasons. She may return as of 7/31/19.    
 
 
 
 
 
 
Sincerely,  
 
 
 
 
Russell Ulrich PA-C

## 2019-08-03 ENCOUNTER — HOSPITAL ENCOUNTER (EMERGENCY)
Age: 54
Discharge: HOME | End: 2019-08-03
Attending: EMERGENCY MEDICINE
Payer: OTHER GOVERNMENT

## 2019-08-03 ENCOUNTER — APPOINTMENT (OUTPATIENT)
Dept: VASCULAR SURGERY | Age: 54
End: 2019-08-03
Attending: EMERGENCY MEDICINE
Payer: OTHER GOVERNMENT

## 2019-08-03 VITALS
WEIGHT: 198 LBS | OXYGEN SATURATION: 99 % | HEIGHT: 64 IN | SYSTOLIC BLOOD PRESSURE: 144 MMHG | DIASTOLIC BLOOD PRESSURE: 91 MMHG | BODY MASS INDEX: 33.8 KG/M2 | RESPIRATION RATE: 20 BRPM | HEART RATE: 92 BPM | TEMPERATURE: 98.6 F

## 2019-08-03 DIAGNOSIS — M25.562 LEFT KNEE PAIN, UNSPECIFIED CHRONICITY: Primary | ICD-10-CM

## 2019-08-03 PROCEDURE — 99282 EMERGENCY DEPT VISIT SF MDM: CPT

## 2019-08-03 PROCEDURE — 93971 EXTREMITY STUDY: CPT

## 2019-08-03 NOTE — ED TRIAGE NOTES
Pt was seen here 7/27 for left knee pain with effusion. Went to [facility] clinic today  And was told to come back to the ED for ultrasound of her leg.  States her knee is warm to touch and lower leg is discolored

## 2019-08-03 NOTE — LETTER
[address] [place] [address] [facility] 
[address] [facility] [id number] [phone] Work/School Note Date: 8/3/2019 To Whom It May concern: 
 
Yvette Vasques was seen and treated today in the emergency room by the following provider(s): 
Attending Provider: Bala Chiang MD 
Physician Assistant: Kelby Slaughter. Yvette Vasques may return to work on 8/4/19 Sincerely, Kelby Diaz

## 2019-08-03 NOTE — DISCHARGE INSTRUCTIONS

## 2019-08-03 NOTE — ED PROVIDER NOTES
EMERGENCY DEPARTMENT HISTORY AND PHYSICAL EXAM    Date: 8/3/2019  Patient Name: Michelle Jenkins    History of Presenting Illness     Chief Complaint   Patient presents with    Leg Pain    Knee Pain         History Provided By: Patient    Chief Complaint: Left lower leg and knee pain  Duration: Days  Timing: Constant  Location: Left lower leg  Quality: Sore  Severity: 7/10  Modifying Factors: Worse when working out  Associated Symptoms: none       Additional History (Context): Michelle Jenkins is a 53 y.o. female with a history of asthma and anemia who presents today for left lower leg and knee pain. Patient reports she was seen in the emergency room on 27 July for the same pain states she had x-rays that were negative and was sent home with an Ace wrap and crutches. Patient followed up with  clinic and was told to come to the ED for rule out of DVT. Patient states she has never had on these in the past.  Denies any recent surgeries. Denies any new falls or trauma. Denies any blood thinner use. Denies any chest pain or shortness of breath. PCP: Karie Monge, DO    Current Outpatient Medications   Medication Sig Dispense Refill    ibuprofen (MOTRIN) 800 mg tablet Take 1 Tab by mouth every six (6) hours as needed for Pain for up to 7 days. 20 Tab 0    potassium chloride (K-DUR, KLOR-CON) 20 mEq tablet Take  by mouth two (2) times a day.  diclofenac EC (VOLTAREN) 75 mg EC tablet Take 1 Tab by mouth two (2) times a day. 60 Tab 0    hydroxychloroquine (PLAQUENIL) 200 mg tablet       ergocalciferol (ERGOCALCIFEROL) 50,000 unit capsule       albuterol-ipratropium (DUO-NEB) 2.5 mg-0.5 mg/3 ml nebu       PROAIR HFA 90 mcg/actuation inhaler       benzonatate (TESSALON) 100 mg capsule       ondansetron (ZOFRAN ODT) 8 mg disintegrating tablet Take 1 Tab by mouth every eight (8) hours as needed for Nausea.  10 Tab 0    pantoprazole (PROTONIX) 40 mg tablet Take 1 Tab by mouth Daily (before breakfast). 7 Tab 0    fluticasone-salmeterol (ADVAIR DISKUS) 250-50 mcg/dose diskus inhaler Take 1 Puff by inhalation every twelve (12) hours.  CYANOCOBALAMIN, VITAMIN B-12, (VITAMIN B-12 PO) Take  by mouth.  albuterol (PROVENTIL, VENTOLIN) 90 mcg/actuation inhaler Take 1-2 Puffs by inhalation every four (4) hours as needed for Wheezing. 1 g 3       Past History     Past Medical History:  Past Medical History:   Diagnosis Date    Anemia     Asthma     BMI 34.0-34.9,adult 5/11/2017    Bowel obstruction (HCC)     Gall stones     GERD (gastroesophageal reflux disease)     History of blood transfusion     Hypertension     [facility]up[facility] [address]     Sickle cell trait ([facility] [address]        Past Surgical History:  Past Surgical History:   Procedure Laterality Date    ABDOMEN SURGERY PROC UNLISTED  2006    surgery for bowel obstruction    HX APPENDECTOMY      HX CHOLECYSTECTOMY      HX HERNIA REPAIR      HX TUBAL LIGATION         Family History:  Family History   Problem Relation Age of Onset    Cancer Mother     Cancer Brother        Social History:  Social History     Tobacco Use    Smoking status: Never Smoker    Smokeless tobacco: Never Used   Substance Use Topics    Alcohol use: No    Drug use: No       Allergies: Allergies   Allergen Reactions    Iron Anaphylaxis    Other Medication Nausea and Vomiting     All \"mycins\"    Penicillins Rash    Sulfa (Sulfonamide Antibiotics) Hives    Venofer [Iron Sucrose] Rash and Itching         Review of Systems   Review of Systems   Constitutional: Negative for chills and fever. HENT: Negative for congestion, rhinorrhea and sore throat. Respiratory: Negative for cough and shortness of breath. Cardiovascular: Negative for chest pain. Gastrointestinal: Negative for abdominal pain, blood in stool, constipation, diarrhea, nausea and vomiting. Genitourinary: Negative for dysuria, frequency and hematuria. Musculoskeletal: Positive for myalgias. Negative for back pain. Skin: Positive for color change. Negative for rash and wound. Neurological: Negative for dizziness and headaches. All other systems reviewed and are negative. All Other Systems Negative  Physical Exam     Vitals:    08/03/19 1425   BP: (!) 144/91   Pulse: 92   Resp: 20   Temp: 98.6 °F (37 °C)   SpO2: 99%   Weight: 89.8 kg (198 lb)   Height: 5' 4\" (1.626 m)     Physical Exam   Constitutional: She is oriented to person, place, and time. She appears well-developed and well-nourished. No distress. HENT:   Head: Normocephalic and atraumatic. Eyes: Conjunctivae are normal.   Neck: Normal range of motion. Neck supple. Cardiovascular: Normal rate, regular rhythm and normal heart sounds. Pulmonary/Chest: Effort normal and breath sounds normal. No respiratory distress. She exhibits no tenderness. Abdominal: Soft. Bowel sounds are normal. She exhibits no distension. There is no tenderness. There is no rebound and no guarding. Musculoskeletal: She exhibits no edema or deformity. Left lower leg: She exhibits tenderness (mild diffuse ). She exhibits no swelling, no edema and no deformity. 2 small bruises noted to left lower leg. No calf pain. No warmth noted. No cellulitis. No secondary signs of infection. No obvious deformities. Strong DP pulses. Neurological: She is alert and oriented to person, place, and time. She has normal reflexes. Skin: Skin is warm and dry. She is not diaphoretic. Psychiatric: She has a normal mood and affect. Her behavior is normal.   Nursing note and vitals reviewed. Diagnostic Study Results     Labs -   No results found for this or any previous visit (from the past 12 hour(s)). Radiologic Studies -   No orders to display     CT Results  (Last 48 hours)    None        CXR Results  (Last 48 hours)    None            Medical Decision Making   I am the first provider for this patient.     I reviewed the vital signs, available nursing notes, past medical history, past surgical history, family history and social history. Vital Signs-Reviewed the patient's vital signs. Records Reviewed: Nursing Notes and Old Medical Records     Procedures: None   Procedures    Provider Notes (Medical Decision Making): Sara January Differential: Contusion, bruising, overuse injury, DVT    Plan: We will order ultrasound to rule out DVT    4:29 PM  Have shared reassuring ultrasound with patient. Will discharge home with Ortho follow-up as needed for right knee pain. I discussed rice care at home with patient. Advised Tylenol Motrin as needed for pain. Patient agrees with the plan and management and states all questions have been thoroughly answered and there are no more remaining questions. MED RECONCILIATION:  No current facility-administered medications for this encounter. Current Outpatient Medications   Medication Sig    ibuprofen (MOTRIN) 800 mg tablet Take 1 Tab by mouth every six (6) hours as needed for Pain for up to 7 days.  potassium chloride (K-DUR, KLOR-CON) 20 mEq tablet Take  by mouth two (2) times a day.  diclofenac EC (VOLTAREN) 75 mg EC tablet Take 1 Tab by mouth two (2) times a day.  hydroxychloroquine (PLAQUENIL) 200 mg tablet     ergocalciferol (ERGOCALCIFEROL) 50,000 unit capsule     albuterol-ipratropium (DUO-NEB) 2.5 mg-0.5 mg/3 ml nebu     PROAIR HFA 90 mcg/actuation inhaler     benzonatate (TESSALON) 100 mg capsule     ondansetron (ZOFRAN ODT) 8 mg disintegrating tablet Take 1 Tab by mouth every eight (8) hours as needed for Nausea.  pantoprazole (PROTONIX) 40 mg tablet Take 1 Tab by mouth Daily (before breakfast).  fluticasone-salmeterol (ADVAIR DISKUS) 250-50 mcg/dose diskus inhaler Take 1 Puff by inhalation every twelve (12) hours.  CYANOCOBALAMIN, VITAMIN B-12, (VITAMIN B-12 PO) Take  by mouth.     albuterol (PROVENTIL, VENTOLIN) 90 mcg/actuation inhaler Take 1-2 Puffs by inhalation every four (4) hours as needed for Wheezing. Disposition:  Home     DISCHARGE NOTE:   Pt has been reexamined. Patient has no new complaints, changes, or physical findings. Care plan outlined and precautions discussed. Results of workup were reviewed with the patient. All medications were reviewed with the patient. All of pt's questions and concerns were addressed. Patient was instructed and agrees to follow up with PCP  as well as to return to the ED upon further deterioration. Patient is ready to go home. Follow-up Information     Follow up With Specialties Details Why Contact Info    [facility] EMERGENCY DEPT Emergency Medicine  As needed [address] [place] [address] BOAZ Kumar In 2 days  [address] [place] [address]  [phone]          Current Discharge Medication List              Diagnosis     Clinical Impression:   1.  Left knee pain, unspecified chronicity

## 2019-08-23 ENCOUNTER — OFFICE VISIT (OUTPATIENT)
Dept: ORTHOPEDIC SURGERY | Facility: CLINIC | Age: 54
End: 2019-08-23

## 2019-08-23 VITALS
OXYGEN SATURATION: 99 % | RESPIRATION RATE: 16 BRPM | DIASTOLIC BLOOD PRESSURE: 88 MMHG | BODY MASS INDEX: 34.83 KG/M2 | SYSTOLIC BLOOD PRESSURE: 139 MMHG | HEIGHT: 64 IN | TEMPERATURE: 97 F | WEIGHT: 204 LBS | HEART RATE: 90 BPM

## 2019-08-23 DIAGNOSIS — M17.11 ARTHRITIS OF RIGHT KNEE: Primary | ICD-10-CM

## 2019-08-23 PROBLEM — E66.01 SEVERE OBESITY (HCC): Status: ACTIVE | Noted: 2019-08-23

## 2019-08-23 RX ORDER — TRIAMCINOLONE ACETONIDE 40 MG/ML
40 INJECTION, SUSPENSION INTRA-ARTICULAR; INTRAMUSCULAR ONCE
Qty: 1 ML | Refills: 0
Start: 2019-08-23 | End: 2019-08-23

## 2019-08-23 NOTE — PROGRESS NOTES
HISTORY OF PRESENT ILLNESS:  Tashia Oconnor is a pleasant, 76-year-old, obese,  female who presents to the office with a several month history of worsening right knee pain. She denies any trauma to the knee. Her pain has limited her ability to stand for a short period and walk short distances. She has trouble bending her knee. She is rating her pain today at rest with her knee flexed to 90° as a 4-5/10 and with activity to include the aforementioned walking and standing, her pain increases to upwards of a 7-8/10. Basically, the pain has a dull, achy characteristic with occasional sharp stabbing sensation over the inner portion of her knee and directly under her kneecap. REVIEW OF SYSTEMS:  No chest pain. She does have exertional dyspnea, which is chronic in nature. No fevers, chills, or night sweats. 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 She occasionally uses an Albuterol inhaler for seasonal allergic rhinitis/asthma. She has had x-rays of the right knee dating back to October 2018, which indicated early tricompartmental osteoarthritis by report. ALLERGIES:  SULFA, PENICILLINS, MYCINS, IRON INFUSION. PHYSICAL EXAM:  She is a healthy-appearing, well-developed, well-nourished, pleasant, 76-year-old, obese,  female, atraumatic, normocephalic, alert and oriented times three, sitting on the table comfortably. She is in shorts. She is joined by her  today. I am joined by Reuben Soto LPN, as my chaperone. Examination of the right knee reveals a 1+ effusion. She has no warmth or erythema. While supine, she has guarded range of motion barely at 80° of flexion actively with full extension. There is a negative Lachmans sign. There is a negative Barbie's sign. There is no calf tenderness or evidence of DVT. She does have crepitation throughout ranging. There is tenderness directly over the medial joint space, which worsens on varus stressing. RADIOGRAPHS:  X-rays, Historical 2018, AP and tunnel with a lateral of the right knee, reveals moderate, medial joint space narrowing. There is also moderate patellofemoral osteoarthritis noted. The lateral joint space seems to be well-preserved. IMPRESSION:      1. Right knee pain. 2. Right knee effusion. 3. Decreased range of motion of the right knee secondary to above. 4. Medial and patellofemoral osteoarthritis moderate in characteristic per imaging today. Medical Decision Making with Comprehensive Data Review:    The patients presenting problems have been discussed, and they/their family are in agreement with the care plan formulated and outlined with them. Treatment option(s) discussed to include, but cannot be limited to Physical / Occupational outpatient therapy, arthrocentesis, elective and or urgent surgical intervention of aforementioned patient medical condition. I have encouraged the patient / family to ask questions as they arise throughout their visit. The patient elected after all options discussed to treat the right knee pain with aspiration / injection. PROCEDURE:  Under sterile technique, after verbal and written consent were obtained and appropriate time out performed, with the patient lying supine with the knee flexed at 20° on a bolster and joined by Jenny Nguyen LPN, as chaperone, 3 cc of 1% Lidocaine was used to anesthetize the right knee using the superolateral intraarticular approach. To follow, 4 cc of straw-colored, blood-tinted aspirate was removed. To follow, 1 cc of Kenalog at 40 mg per mL mixed with 10 mL of Sensorcaine 0.75% was injected. PLAN:   I am currently recommending an aspiration and injection of her right knee today. Today, we discussed the alternative to using cortisone, as well as weight management to decrease axial loading of the right knee.   That is to include, but cannot be limited to, exercise with elliptical, stationary or road bicycle, and/or swimming. She is to avoid any running or jumping outright. She may walk on the treadmill, but running was not recommended. Today, her x-rays were reviewed, and all her questions were answered to her satisfaction.

## 2019-08-27 NOTE — PROGRESS NOTES
Please Note:    The above patient was treated with the assistance of the [employer] Ultrasound device. Image(s) captured, saved, printed, and copied to chart.

## 2019-10-30 ENCOUNTER — HOSPITAL ENCOUNTER (EMERGENCY)
Age: 54
Discharge: HOME | End: 2019-10-30
Attending: EMERGENCY MEDICINE
Payer: OTHER GOVERNMENT

## 2019-10-30 VITALS
RESPIRATION RATE: 18 BRPM | HEART RATE: 88 BPM | TEMPERATURE: 98.6 F | DIASTOLIC BLOOD PRESSURE: 80 MMHG | SYSTOLIC BLOOD PRESSURE: 140 MMHG

## 2019-10-30 DIAGNOSIS — T78.40XA ALLERGIC REACTION, INITIAL ENCOUNTER: Primary | ICD-10-CM

## 2019-10-30 PROCEDURE — 96375 TX/PRO/DX INJ NEW DRUG ADDON: CPT

## 2019-10-30 PROCEDURE — 74011250636 HC RX REV CODE- 250/636: Performed by: NURSE PRACTITIONER

## 2019-10-30 PROCEDURE — 96374 THER/PROPH/DIAG INJ IV PUSH: CPT

## 2019-10-30 PROCEDURE — 99281 EMR DPT VST MAYX REQ PHY/QHP: CPT

## 2019-10-30 RX ORDER — DIPHENHYDRAMINE HYDROCHLORIDE 50 MG/ML
25 INJECTION, SOLUTION INTRAMUSCULAR; INTRAVENOUS ONCE
Status: COMPLETED | OUTPATIENT
Start: 2019-10-30 | End: 2019-10-30

## 2019-10-30 RX ORDER — FAMOTIDINE 10 MG/ML
20 INJECTION INTRAVENOUS
Status: COMPLETED | OUTPATIENT
Start: 2019-10-30 | End: 2019-10-30

## 2019-10-30 RX ORDER — SODIUM CHLORIDE 0.9 % (FLUSH) 0.9 %
5-40 SYRINGE (ML) INJECTION EVERY 8 HOURS
Status: DISCONTINUED | OUTPATIENT
Start: 2019-10-30 | End: 2019-10-31 | Stop reason: HOSPADM

## 2019-10-30 RX ORDER — FAMOTIDINE 20 MG/1
20 TABLET, FILM COATED ORAL 2 TIMES DAILY
Qty: 14 TAB | Refills: 0 | Status: SHIPPED | OUTPATIENT
Start: 2019-10-30 | End: 2019-11-06

## 2019-10-30 RX ORDER — PREDNISONE 10 MG/1
TABLET ORAL
Qty: 1 PACKAGE | Refills: 0 | Status: SHIPPED | OUTPATIENT
Start: 2019-10-30 | End: 2020-06-15 | Stop reason: ALTCHOICE

## 2019-10-30 RX ORDER — SODIUM CHLORIDE 0.9 % (FLUSH) 0.9 %
5-40 SYRINGE (ML) INJECTION AS NEEDED
Status: DISCONTINUED | OUTPATIENT
Start: 2019-10-30 | End: 2019-10-31 | Stop reason: HOSPADM

## 2019-10-30 RX ORDER — DIPHENHYDRAMINE HCL 25 MG
50 CAPSULE ORAL
Qty: 30 CAP | Refills: 0 | Status: SHIPPED | OUTPATIENT
Start: 2019-10-30 | End: 2019-11-03

## 2019-10-30 RX ADMIN — FAMOTIDINE 20 MG: 10 INJECTION INTRAVENOUS at 17:56

## 2019-10-30 RX ADMIN — DIPHENHYDRAMINE HYDROCHLORIDE 25 MG: 50 INJECTION INTRAMUSCULAR; INTRAVENOUS at 17:54

## 2019-10-30 RX ADMIN — METHYLPREDNISOLONE SODIUM SUCCINATE 125 MG: 125 INJECTION, POWDER, FOR SOLUTION INTRAMUSCULAR; INTRAVENOUS at 17:58

## 2019-10-30 NOTE — ED NOTES
Reassessed pt. Sitting in RW; sitting in wheelchair in NAD. Denies any throat irritation or difficulty breathing.

## 2019-10-30 NOTE — ED TRIAGE NOTES
\"I ate a pomegranate a few minutes ago and now it feels like my throat is tight. \"  Speaking in complete sentences w/o difficulty. O2 sats-99% on RA.

## 2019-10-30 NOTE — ED PROVIDER NOTES
[facility]  Emergency Department Treatment Report        7:27 PM Hailey Lane is a 53 y.o2. female with a history of eating pomegranates with no prior allergic reaction to pomegranate who presents to ED with itching to her throat and a funny feeling. No rashes reported. No respiratory distress no shortness of breath. No stridor noted    No other complaints, associated symptoms or modifying factors at this time. PCP: Magnus Goel,       The history is provided by the patient. No  was used. Allergic Reaction   This is a new problem. The current episode started 1 to 2 hours ago. The problem occurs constantly. The problem has been resolved. Pertinent negatives include no chest pain, no abdominal pain, no headaches and no shortness of breath. Nothing aggravates the symptoms. The symptoms are relieved by medications. She has tried nothing for the symptoms.         Past Medical History:   Diagnosis Date    Anemia     Asthma     BMI 34.0-34.9,adult 5/11/2017    Bowel obstruction (HCC)     Gall stones     GERD (gastroesophageal reflux disease)     History of blood transfusion     Hypertension     [facility]upus ([facility]     Sickle cell trait ([facility]        Past Surgical History:   Procedure Laterality Date    ABDOMEN SURGERY PROC UNLISTED  2006    surgery for bowel obstruction    HX APPENDECTOMY      HX CHOLECYSTECTOMY      HX HERNIA REPAIR      HX TUBAL LIGATION           Family History:   Problem Relation Age of Onset    Cancer Mother     Cancer Brother        Social History     Socioeconomic History    Marital status:      Spouse name: Not on file    Number of children: Not on file    Years of education: Not on file    Highest education level: Not on file   Occupational History    Not on file   Social Needs    Financial resource strain: Not on file    Food insecurity:     Worry: Not on file     Inability: Not on file    Transportation needs: Medical: Not on file     Non-medical: Not on file   Tobacco Use    Smoking status: Never Smoker    Smokeless tobacco: Never Used   Substance and Sexual Activity    Alcohol use: No    Drug use: No    Sexual activity: Not on file   Lifestyle    Physical activity:     Days per week: Not on file     Minutes per session: Not on file    Stress: Not on file   Relationships    Social connections:     Talks on phone: Not on file     Gets together: Not on file     Attends Amish service: Not on file     Active member of club or organization: Not on file     Attends meetings of clubs or organizations: Not on file     Relationship status: Not on file    Intimate partner violence:     Fear of current or ex partner: Not on file     Emotionally abused: Not on file     Physically abused: Not on file     Forced sexual activity: Not on file   Other Topics Concern    Not on file   Social History Narrative    Not on file         ALLERGIES: Iron; Other medication; Penicillins; Pomegranate; Sulfa (sulfonamide antibiotics); and Venofer [iron sucrose]    Review of Systems   Constitutional: Negative for fever. HENT: Positive for sore throat. Negative for trouble swallowing. Respiratory: Negative for cough, choking, chest tightness, shortness of breath, wheezing and stridor. Cardiovascular: Negative for chest pain and palpitations. Gastrointestinal: Negative for abdominal pain. Skin: Negative for color change. Neurological: Negative for dizziness and headaches. Psychiatric/Behavioral: Negative for agitation. All other systems reviewed and are negative. Vitals:    10/30/19 1830   BP: 140/80   Pulse: 88   Resp: 18   Temp: 98.6 °F (37 °C)            Physical Exam   Constitutional: She is oriented to person, place, and time. She appears well-developed and well-nourished. HENT:   Head: Normocephalic and atraumatic.    Mouth/Throat: Uvula is midline, oropharynx is clear and moist and mucous membranes are normal.   Eyes: Pupils are equal, round, and reactive to light. Conjunctivae and EOM are normal.   Neck: Normal range of motion. Neck supple. Cardiovascular: Normal rate and regular rhythm. Pulmonary/Chest: Effort normal and breath sounds normal. No stridor. No respiratory distress. She has no wheezes. She has no rales. She exhibits no tenderness. Abdominal: Soft. Bowel sounds are normal.   Musculoskeletal: Normal range of motion. Neurological: She is alert and oriented to person, place, and time. She has normal reflexes. Skin: Skin is warm and dry. Psychiatric: She has a normal mood and affect. Her behavior is normal. Judgment and thought content normal.   Nursing note and vitals reviewed. MDM  Number of Diagnoses or Management Options  Allergic reaction, initial encounter: established and improving  Diagnosis management comments: When I had seen patient she had already gotten her sedated she felt better and her symptoms were resolved I suspect a food allergy to pomegranates patient advised not to eat the pomegranates anymore. I will continue patient on prednisone Benadryl and Pepcid she will follow-up with her primary care and return to the emergency room if worse. No other acute illness is suspected.   Allergy to pomegranates is resolved after treatment       Amount and/or Complexity of Data Reviewed  Review and summarize past medical records: yes  Independent visualization of images, tracings, or specimens: yes    Risk of Complications, Morbidity, and/or Mortality  Presenting problems: low  Diagnostic procedures: low  Management options: low    Patient Progress  Patient progress: improved         Procedures            Vitals:  Patient Vitals for the past 12 hrs:   Temp Pulse Resp BP   10/30/19 1830 98.6 °F (37 °C) 88 18 140/80       Medications ordered:   Medications   sodium chloride (NS) flush 5-40 mL (has no administration in time range)   sodium chloride (NS) flush 5-40 mL (has no administration in time range)   diphenhydrAMINE (BENADRYL) injection 25 mg (25 mg IntraVENous Given 10/30/19 1754)   methylPREDNISolone (PF) (Solu-MEDROL) injection 125 mg (125 mg IntraVENous Given 10/30/19 1758)   famotidine (PF) (PEPCID) injection 20 mg (20 mg IntraVENous Given 10/30/19 1756)             Disposition:    Diagnosis:   1. Allergic reaction, initial encounter        Disposition: to Home      Follow-up Information     Follow up With Specialties Details Why Contact Info    Flor Guillen, DO Sports Medicine In 2 days  [address] [facility] [address] [phone]             Patient's Medications   Start Taking    DIPHENHYDRAMINE (BENADRYL) 25 MG CAPSULE    Take 2 Caps by mouth every six (6) hours as needed for Itching for up to 4 days. FAMOTIDINE (PEPCID) 20 MG TABLET    Take 1 Tab by mouth two (2) times a day for 7 days. PREDNISONE (STERAPRED DS) 10 MG DOSE PACK    6 day dose pack per package instructions   Continue Taking    ALBUTEROL (PROVENTIL, VENTOLIN) 90 MCG/ACTUATION INHALER    Take 1-2 Puffs by inhalation every four (4) hours as needed for Wheezing. ALBUTEROL-IPRATROPIUM (DUO-NEB) 2.5 MG-0.5 MG/3 ML NEBU        BENZONATATE (TESSALON) 100 MG CAPSULE        CYANOCOBALAMIN, VITAMIN B-12, (VITAMIN B-12 PO)    Take  by mouth. DICLOFENAC EC (VOLTAREN) 75 MG EC TABLET    Take 1 Tab by mouth two (2) times a day. ERGOCALCIFEROL (ERGOCALCIFEROL) 50,000 UNIT CAPSULE        FLUTICASONE-SALMETEROL (ADVAIR DISKUS) 250-50 MCG/DOSE DISKUS INHALER    Take 1 Puff by inhalation every twelve (12) hours. HYDROXYCHLOROQUINE (PLAQUENIL) 200 MG TABLET        ONDANSETRON (ZOFRAN ODT) 8 MG DISINTEGRATING TABLET    Take 1 Tab by mouth every eight (8) hours as needed for Nausea. PANTOPRAZOLE (PROTONIX) 40 MG TABLET    Take 1 Tab by mouth Daily (before breakfast). POTASSIUM CHLORIDE (K-DUR, KLOR-CON) 20 MEQ TABLET    Take  by mouth two (2) times a day.     PROAIR HFA 90 MCG/ACTUATION INHALER       These Medications have changed    No medications on file   Stop Taking    No medications on file       Return to the ER if you are unable to obtain referral as directed. Shona Moore's  results have been reviewed with her. She has been counseled regarding her diagnosis, treatment, and plan. She verbally conveys understanding and agreement of the signs, symptoms, diagnosis, treatment and prognosis and additionally agrees to follow up as discussed. She also agrees with the care-plan and conveys that all of her questions have been answered. I have also provided discharge instructions for her that include: educational information regarding their diagnosis and treatment, and list of reasons why they would want to return to the ED prior to their follow-up appointment, should her condition change. Sol Mcmullen ENP-C,FNP-C      Dragon voice recognition was used to generate this report, which may have resulted in some phonetic based errors in grammar and contents.  Even though attempts were made to correct all the mistakes, some may have been missed, and remained in the body of the document

## 2019-10-30 NOTE — DISCHARGE INSTRUCTIONS
Patient Education        Allergic Reaction: Care Instructions  Your Care Instructions    An allergic reaction is an excessive response from your immune system to a medicine, chemical, food, insect bite, or other substance. A reaction can range from mild to life-threatening. Some people have a mild rash, hives, and itching or stomach cramps. In severe reactions, swelling of your tongue and throat can close up your airway so that you cannot breathe. Follow-up care is a key part of your treatment and safety. Be sure to make and go to all appointments, and call your doctor if you are having problems. It's also a good idea to know your test results and keep a list of the medicines you take. How can you care for yourself at home? · If you know what caused your allergic reaction, be sure to avoid it. Your allergy may become more severe each time you have a reaction. · Take an over-the-counter antihistamine, such as cetirizine (Zyrtec) or loratadine (Claritin), to treat mild symptoms. Read and follow directions on the label. Some antihistamines can make you feel sleepy. Do not give antihistamines to a child unless you have checked with your doctor first. Mild symptoms include sneezing or an itchy or runny nose; an itchy mouth; a few hives or mild itching; and mild nausea or stomach discomfort. · Do not scratch hives or a rash. Put a cold, moist towel on them or take cool baths to relieve itching. Put ice packs on hives, swelling, or insect stings for 10 to 15 minutes at a time. Put a thin cloth between the ice pack and your skin. Do not take hot baths or showers. They will make the itching worse. · Your doctor may prescribe a shot of epinephrine to carry with you in case you have a severe reaction. Learn how to give yourself the shot and keep it with you at all times. Make sure it is not .   · Go to the emergency room every time you have a severe reaction, even if you have used your shot of epinephrine and are feeling better. Symptoms can come back after a shot. · Wear medical alert jewelry that lists your allergies. You can buy this at most drugstores. · If your child has a severe allergy, make sure that his or her teachers, babysitters, coaches, and other caregivers know about the allergy. They should have an epinephrine shot, know how and when to give it, and have a plan to take your child to the hospital.  When should you call for help? Give an epinephrine shot if:    · You think you are having a severe allergic reaction.     · You have symptoms in more than one body area, such as mild nausea and an itchy mouth.    After giving an epinephrine shot call 911, even if you feel better.   Call 911 if:    · You have symptoms of a severe allergic reaction. These may include:  ? Sudden raised, red areas (hives) all over your body. ? Swelling of the throat, mouth, lips, or tongue. ? Trouble breathing. ? Passing out (losing consciousness). Or you may feel very lightheaded or suddenly feel weak, confused, or restless.     · You have been given an epinephrine shot, even if you feel better.    Call your doctor now or seek immediate medical care if:    · You have symptoms of an allergic reaction, such as:  ? A rash or hives (raised, red areas on the skin). ? Itching. ? Swelling. ? Belly pain, nausea, or vomiting.    Watch closely for changes in your health, and be sure to contact your doctor if:    · You do not get better as expected. Where can you learn more? Go to http://giselle-pradeep.info/. Enter E620 in the search box to learn more about \"Allergic Reaction: Care Instructions. \"  Current as of: April 7, 2019  Content Version: 12.2  © 6106-5641 [employer]. Care instructions adapted under license by [employer] (which disclaims liability or warranty for this information).  If you have questions about a medical condition or this instruction, always ask your healthcare professional. [address] any warranty or liability for your use of this information.

## 2019-12-08 ENCOUNTER — HOSPITAL ENCOUNTER (EMERGENCY)
Age: 54
Discharge: HOME | End: 2019-12-08
Attending: EMERGENCY MEDICINE
Payer: OTHER GOVERNMENT

## 2019-12-08 VITALS
OXYGEN SATURATION: 100 % | DIASTOLIC BLOOD PRESSURE: 89 MMHG | RESPIRATION RATE: 18 BRPM | SYSTOLIC BLOOD PRESSURE: 144 MMHG | WEIGHT: 190 LBS | HEIGHT: 65 IN | TEMPERATURE: 97.9 F | BODY MASS INDEX: 31.65 KG/M2 | HEART RATE: 97 BPM

## 2019-12-08 PROCEDURE — 75810000275 HC EMERGENCY DEPT VISIT NO LEVEL OF CARE

## 2019-12-09 NOTE — ED TRIAGE NOTES
Pt appears angry with staff. When asked what happened, pt states \"Yall just sitting here doing paperwork and I'm having an allergic reaction. \"  Pt states she ate an apple around 2130 and \"had an allergic reaction. \" States she did not take any benadryl at home.

## 2020-06-15 ENCOUNTER — OFFICE VISIT (OUTPATIENT)
Dept: ORTHOPEDIC SURGERY | Facility: CLINIC | Age: 55
End: 2020-06-15

## 2020-06-15 VITALS
TEMPERATURE: 97.1 F | SYSTOLIC BLOOD PRESSURE: 132 MMHG | HEIGHT: 65 IN | WEIGHT: 206.4 LBS | DIASTOLIC BLOOD PRESSURE: 98 MMHG | HEART RATE: 93 BPM | BODY MASS INDEX: 34.39 KG/M2

## 2020-06-15 DIAGNOSIS — R20.2 NUMBNESS AND TINGLING IN LEFT HAND: ICD-10-CM

## 2020-06-15 DIAGNOSIS — R20.2 NUMBNESS AND TINGLING IN RIGHT HAND: ICD-10-CM

## 2020-06-15 DIAGNOSIS — M18.12 ARTHRITIS OF CARPOMETACARPAL (CMC) JOINT OF LEFT THUMB: Primary | ICD-10-CM

## 2020-06-15 DIAGNOSIS — R20.0 NUMBNESS AND TINGLING IN LEFT HAND: ICD-10-CM

## 2020-06-15 DIAGNOSIS — R20.0 NUMBNESS AND TINGLING IN RIGHT HAND: ICD-10-CM

## 2020-06-15 RX ORDER — BETAMETHASONE SODIUM PHOSPHATE AND BETAMETHASONE ACETATE 3; 3 MG/ML; MG/ML
6 INJECTION, SUSPENSION INTRA-ARTICULAR; INTRALESIONAL; INTRAMUSCULAR; SOFT TISSUE ONCE
Qty: 0.5 ML | Refills: 0
Start: 2020-06-15 | End: 2020-06-15

## 2020-06-15 NOTE — PROGRESS NOTES
Pt states that her Lt thumb got locked up and had appearance of something on thumb/wrist. Has been wearing splint since then. States 800mg ibuprofen is not helpful.
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
## 2020-06-15 NOTE — PROGRESS NOTES
Verbal order given by Dr. Jannette Mena to draw up 4 mL 0.25% Sensorcaine and 0.5 mL 30 mg/5mL Betamethasone.

## 2020-07-10 ENCOUNTER — OFFICE VISIT (OUTPATIENT)
Dept: ORTHOPEDIC SURGERY | Age: 55
End: 2020-07-10

## 2020-07-10 VITALS
BODY MASS INDEX: 33.82 KG/M2 | WEIGHT: 203 LBS | TEMPERATURE: 98 F | SYSTOLIC BLOOD PRESSURE: 129 MMHG | HEART RATE: 95 BPM | HEIGHT: 65 IN | OXYGEN SATURATION: 97 % | DIASTOLIC BLOOD PRESSURE: 89 MMHG

## 2020-07-10 DIAGNOSIS — R20.2 NUMBNESS AND TINGLING IN BOTH HANDS: Primary | ICD-10-CM

## 2020-07-10 DIAGNOSIS — R20.0 NUMBNESS AND TINGLING IN BOTH HANDS: Primary | ICD-10-CM

## 2020-07-10 NOTE — LETTER
7/10/20 Patient: Mayank Laguna YOB: 1965 Date of Visit: 7/10/2020 Lola Mcdonald [place] 
[address] [place] [id number] VIA Facsimile: [phone] Patti Sosa MD 
[address] [address] VIA In Basket Dear DO Patti Diallo MD, Thank you for referring Ms. Mayank Laguna to [address] [facility] for evaluation. My notes for this consultation are attached. If you have questions, please do not hesitate to call me. I look forward to following your patient along with you.  
 
 
Sincerely, 
 
Aman Sinclair MD

## 2020-07-10 NOTE — PROGRESS NOTES
[address]  [address], [address]  [address]  Phone: [phone]  Fax: [phone]        Eli White  : 1965  PCP: Jazzy Stanley DO  7/10/2020    ELECTROMYOGRAPHY AND NERVE CONDUCTION STUDIES    Xuan Ayala was referred by Dr. Albertina Dawson for electrodiagnostic evaluation of bilateral hand numbness and tingling. NCV & EMG Findings:  Evaluation of the left median motor nerve showed prolonged distal onset latency (5.0 ms). The right median motor nerve showed prolonged distal onset latency (6.8 ms) and decreased conduction velocity (Elbow-Wrist, 47 m/s). The right ulnar motor nerve showed decreased conduction velocity (A Elbow-B Elbow, 42 m/s). The left median sensory and the right median sensory nerves showed prolonged distal peak latency (L4.3, R6.0 ms) and decreased conduction velocity (Wrist-2nd Digit, L33, R23 m/s). All remaining nerves (as indicated in the following tables) were within normal limits. Left vs. Right side comparison data for the median motor nerve indicates abnormal L-R latency difference (1.8 ms). The median sensory nerve indicates abnormal L-R latency difference (1.7 ms) and abnormal L-R amplitude difference (64.4 %). All remaining left vs. right side differences were within normal limits. All examined muscles (as indicated in the following table) showed no evidence of electrical instability. INTERPRETATION  This is an abnormal electrodiagnostic examination. These findings may be consistent with:  1. Moderate median mononeuropathy at the wrist bilaterally, R>L (carpal tunnel syndrome)  2. Mild ulnar mononeuropathy at the right elbow (cubital tunnel syndrome)    There is no electrodiagnostic evidence of any cervical radiculopathy, brachial plexopathy, peripheral polyneuropathy, or any other mononeuropathy.     CLINICAL INTERPRETATION  Her electrodiagnostic findings of bilateral carpal tunnel syndrome are consistent with her bilateral hand symptoms. HISTORY OF PRESENT ILLNESS  Deyvi Farah is a 53 y.o. female. Pt presents today for BUE EMG evaluation of bilateral hand numbness, tingling, and pain. She reported difficulty pinching, grabbing, and holding items. Her symptoms are worse at night and with use. She often has to shake her hands out. She reports h/o trigger finger surgeries (left middle finger 2017; left ring finger 2019)    PAST MEDICAL HISTORY   Past Medical History:   Diagnosis Date    Anemia     Asthma     BMI 34.0-34.9,adult 5/11/2017    Bowel obstruction (HCC)     Gall stones     GERD (gastroesophageal reflux disease)     History of blood transfusion     Hypertension     Lupus ([facility] [address]     Sickle cell trait ([facility] [address]        Past Surgical History:   Procedure Laterality Date    ABDOMEN SURGERY PROC UNLISTED  2006    surgery for bowel obstruction    HX APPENDECTOMY      HX CHOLECYSTECTOMY      HX HERNIA REPAIR      HX TUBAL LIGATION     . MEDICATIONS    Current Outpatient Medications   Medication Sig Dispense Refill    potassium chloride (K-DUR, KLOR-CON) 20 mEq tablet Take  by mouth two (2) times a day.  diclofenac EC (VOLTAREN) 75 mg EC tablet Take 1 Tab by mouth two (2) times a day. 60 Tab 0    hydroxychloroquine (PLAQUENIL) 200 mg tablet       ergocalciferol (ERGOCALCIFEROL) 50,000 unit capsule       albuterol-ipratropium (DUO-NEB) 2.5 mg-0.5 mg/3 ml nebu       PROAIR HFA 90 mcg/actuation inhaler       benzonatate (TESSALON) 100 mg capsule       ondansetron (ZOFRAN ODT) 8 mg disintegrating tablet Take 1 Tab by mouth every eight (8) hours as needed for Nausea. 10 Tab 0    pantoprazole (PROTONIX) 40 mg tablet Take 1 Tab by mouth Daily (before breakfast). 7 Tab 0    fluticasone-salmeterol (ADVAIR DISKUS) 250-50 mcg/dose diskus inhaler Take 1 Puff by inhalation every twelve (12) hours.       CYANOCOBALAMIN, VITAMIN B-12, (VITAMIN B-12 PO) Take by mouth.  albuterol (PROVENTIL, VENTOLIN) 90 mcg/actuation inhaler Take 1-2 Puffs by inhalation every four (4) hours as needed for Wheezing. 1 g 3        ALLERGIES  Allergies   Allergen Reactions    Iron Anaphylaxis    Other Medication Nausea and Vomiting     All \"mycins\"    Penicillins Rash    Pomegranate Angioedema     Itching    Sulfa (Sulfonamide Antibiotics) Hives    Venofer [Iron Sucrose] Rash and Itching          SOCIAL HISTORY    Social History     Socioeconomic History    Marital status:      Spouse name: Not on file    Number of children: Not on file    Years of education: Not on file    Highest education level: Not on file   Tobacco Use    Smoking status: Never Smoker    Smokeless tobacco: Never Used   Substance and Sexual Activity    Alcohol use: No    Drug use: No       FAMILY HISTORY  Family History   Problem Relation Age of Onset    Cancer Mother     Cancer Brother          PHYSICAL EXAMINATION  Visit Vitals  /89 (BP 1 Location: Left arm, BP Patient Position: Sitting)   Pulse 95   Temp 98 °F (36.7 °C) (Oral)   Ht 5' 5\" (1.651 m)   Wt 203 lb (92.1 kg)   SpO2 97%   BMI 33.78 kg/m²       Pain Assessment  6/15/2020   Location of Pain Hand   Pain Location Comment -   Location Modifiers Left;Right   Severity of Pain 9   Quality of Pain Throbbing;Locking   Quality of Pain Comment -   Duration of Pain Persistent   Frequency of Pain Constant   Aggravating Factors Bending;Straightening; Other (Comment)   Aggravating Factors Comment -   Limiting Behavior Yes   Relieving Factors Rest;Heat;NSAID   Result of Injury -           Constitutional:  Well developed, well nourished, in no acute distress. Psychiatric: Affect and mood are appropriate. Integumentary: No rashes or abrasions noted on exposed areas. SPINE/MUSCULOSKELETAL EXAM    On brief examination: None.       NCV & EMG Findings:  Nerve Conduction Studies  Anti Sensory Summary Table     Stim Site NR Peak (ms) Norm Peak (ms) O-P Amp (µV) Norm O-P Amp Site1 Site2 Delta-P (ms) Dist (cm) Román (m/s) Norm Román (m/s)   Left Median Anti Sensory (2nd Digit)   Wrist    4.3 <3.6 31.5 >10 Wrist 2nd Digit 4.3 14.0 33 >39   Right Median Anti Sensory (2nd Digit)   Wrist    6.0 <3.6 11.2 >10 Wrist 2nd Digit 6.0 14.0 23 >39   Left Radial Anti Sensory (Base 1st Digit)   Wrist    2.1 <3.1 24.3  Wrist Base 1st Digit 2.1 0.0     Right Radial Anti Sensory (Base 1st Digit)   Wrist    2.2 <3.1 49.1  Wrist Base 1st Digit 2.2 0.0     Left Ulnar Anti Sensory (5th Digit)   Wrist    3.1 <3.7 28.8 >15.0 Wrist 5th Digit 3.1 14.0 45 >38   Right Ulnar Anti Sensory (5th Digit)   Wrist    3.1 <3.7 16.6 >15.0 Wrist 5th Digit 3.1 14.0 45 >38     Motor Summary Table     Stim Site NR Onset (ms) Norm Onset (ms) O-P Amp (mV) Norm O-P Amp Site1 Site2 Delta-0 (ms) Dist (cm) Román (m/s) Norm Román (m/s)   Left Median Motor (Abd Poll Brev)   Wrist    5.0 <4.2 5.8 >5 Elbow Wrist 3.9 20.5 53 >50   Elbow    8.9  5.6          Right Median Motor (Abd Poll Brev)   Wrist    6.8 <4.2 8.5 >5 Elbow Wrist 4.5 21.0 47 >50   Elbow    11.3  8.4          Left Ulnar Motor (Abd Dig Min)   Wrist    2.5 <4.2 10.4 >3 B Elbow Wrist 3.4 18.0 53 >53   B Elbow    5.9  9.8  A Elbow B Elbow 1.8 10.0 56 >53   A Elbow    7.7  8.4          Right Ulnar Motor (Abd Dig Min)   Wrist    2.5 <4.2 10.3 >3 B Elbow Wrist 3.1 17.5 56 >53   B Elbow    5.6  10.1  A Elbow B Elbow 2.4 10.0 42 >53   A Elbow    8.0  10.3            EMG     Side Muscle Nerve Root Ins Act Fibs Psw Amp Dur Poly Recrt Int Saige Roque Comment   Left Biceps Musculocut C5-6 Nml Nml Nml Nml Nml 0 Nml Nml    Left Triceps Radial C6-7-8 Nml Nml Nml Nml Nml 0 Nml Nml    Left PronatorTeres Median C6-7 Nml Nml Nml Nml Nml 0 Nml Nml    Left Abd Poll Brev Median C8-T1 Nml Nml Nml Nml Nml 0 Nml Nml    Left 1stDorInt Ulnar C8-T1 Nml Nml Nml Nml Nml 0 Nml Nml    Right Biceps Musculocut C5-6 Nml Nml Nml Nml Nml 0 Nml Nml    Right Triceps Radial C6-7-8 Nml Nml Nml Nml Nml 0 Nml Nml    Right PronatorTeres Median C6-7 Nml Nml Nml Nml Nml 0 Nml Nml    Right Abd Poll Brev Median C8-T1 Nml Nml Nml Nml Nml 0 Nml Nml    Right 1stDorInt Ulnar C8-T1 Nml Nml Nml Nml Nml 0 Nml Nml        Nerve Conduction Studies  Anti Sensory Left/Right Comparison     Stim Site L Lat (ms) R Lat (ms) L-R Lat (ms) L Amp (µV) R Amp (µV) L-R Amp (%) Site1 Site2 L Román (m/s) R Román (m/s) L-R Román (m/s)   Median Anti Sensory (2nd Digit)   Wrist 4.3 6.0 1.7 31.5 11.2 64.4 Wrist 2nd Digit 33 23 10   Radial Anti Sensory (Base 1st Digit)   Wrist 2.1 2.2 0.1 24.3 49.1 50.5 Wrist Base 1st Digit      Ulnar Anti Sensory (5th Digit)   Wrist 3.1 3.1 0.0 28.8 16.6 42.4 Wrist 5th Digit 45 45 0     Motor Left/Right Comparison     Stim Site L Lat (ms) R Lat (ms) L-R Lat (ms) L Amp (mV) R Amp (mV) L-R Amp (%) Site1 Site2 L Román (m/s) R Román (m/s) L-R Román (m/s)   Median Motor (Abd Poll Brev)   Wrist 5.0 6.8 1.8 5.8 8.5 31.8 Elbow Wrist 53 47 6   Elbow 8.9 11.3 2.4 5.6 8.4 33.3        Ulnar Motor (Abd Dig Min)   Wrist 2.5 2.5 0.0 10.4 10.3 1.0 B Elbow Wrist 53 56 3   B Elbow 5.9 5.6 0.3 9.8 10.1 3.0 A Elbow B Elbow 56 42 14   A Elbow 7.7 8.0 0.3 8.4 10.3 18.4              Waveforms:                                  [facility]  OFFICE PROCEDURE PROGRESS NOTE        Chart reviewed for the following:   Geri QUINTANA, have reviewed the History, Physical and updated the Allergic reactions for Shona Lovett 89 performed immediately prior to start of procedure:   Geri QUINTANAe, have performed the following reviews on Chance Garza prior to the start of the procedure:            * Patient was identified by name and date of birth   * Agreement on procedure being performed was verified  * Risks and Benefits explained to the patient  * Procedure site verified and marked as necessary  * Patient was positioned for comfort  * Consent was signed and verified     Time: 11:25 AM    Date of procedure: 7/10/2020    Procedure performed by:  Hyun Andrew MD    Provider accompanied by: Merary. Patient accompanied by: Self.     How tolerated by patient: tolerated the procedure well with no complications    Post Procedural Pain Scale: 0 - No Hurt    Comments: none    Written by Arron Ayala, [address] as dictated by Nazario Villafuerte MD

## 2020-07-16 ENCOUNTER — OFFICE VISIT (OUTPATIENT)
Dept: ORTHOPEDIC SURGERY | Facility: CLINIC | Age: 55
End: 2020-07-16

## 2020-07-16 VITALS
WEIGHT: 206 LBS | HEART RATE: 89 BPM | SYSTOLIC BLOOD PRESSURE: 125 MMHG | TEMPERATURE: 97.3 F | HEIGHT: 65 IN | BODY MASS INDEX: 34.32 KG/M2 | OXYGEN SATURATION: 100 % | DIASTOLIC BLOOD PRESSURE: 87 MMHG

## 2020-07-16 DIAGNOSIS — R20.0 NUMBNESS AND TINGLING IN BOTH HANDS: Primary | ICD-10-CM

## 2020-07-16 DIAGNOSIS — R20.2 NUMBNESS AND TINGLING IN BOTH HANDS: Primary | ICD-10-CM

## 2020-07-16 DIAGNOSIS — G56.01 CARPAL TUNNEL SYNDROME, RIGHT: ICD-10-CM

## 2020-07-16 DIAGNOSIS — G56.02 CARPAL TUNNEL SYNDROME, LEFT: ICD-10-CM
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
## 2020-08-14 DIAGNOSIS — G56.01 CARPAL TUNNEL SYNDROME, RIGHT: Primary | ICD-10-CM

## 2020-08-14 DIAGNOSIS — Z01.811 PRE-OPERATIVE RESPIRATORY EXAMINATION: ICD-10-CM

## 2020-08-14 DIAGNOSIS — Z01.812 PRE-OPERATIVE LABORATORY EXAMINATION: ICD-10-CM

## 2020-08-14 DIAGNOSIS — Z01.810 PRE-OPERATIVE CARDIOVASCULAR EXAMINATION: ICD-10-CM

## 2020-10-02 ENCOUNTER — HOSPITAL ENCOUNTER (OUTPATIENT)
Dept: GENERAL RADIOLOGY | Age: 55
Discharge: HOME | End: 2020-10-02
Payer: OTHER GOVERNMENT

## 2020-10-02 ENCOUNTER — HOSPITAL ENCOUNTER (OUTPATIENT)
Dept: LAB | Age: 55
Discharge: HOME | End: 2020-10-02
Payer: OTHER GOVERNMENT

## 2020-10-02 DIAGNOSIS — G56.01 CARPAL TUNNEL SYNDROME, RIGHT: ICD-10-CM

## 2020-10-02 DIAGNOSIS — Z01.812 PRE-OPERATIVE LABORATORY EXAMINATION: ICD-10-CM

## 2020-10-02 DIAGNOSIS — Z01.811 PRE-OPERATIVE RESPIRATORY EXAMINATION: ICD-10-CM

## 2020-10-02 DIAGNOSIS — Z01.810 PRE-OPERATIVE CARDIOVASCULAR EXAMINATION: ICD-10-CM

## 2020-10-02 LAB
ANION GAP SERPL CALC-SCNC: 5 MMOL/L (ref 3–18)
ATRIAL RATE: 93 BPM
BUN SERPL-MCNC: 12 MG/DL (ref 7–18)
BUN/CREAT SERPL: 14 (ref 12–20)
CALCIUM SERPL-MCNC: 9.6 MG/DL (ref 8.5–10.1)
CALCULATED P AXIS, ECG09: 46 DEGREES
CALCULATED R AXIS, ECG10: -8 DEGREES
CALCULATED T AXIS, ECG11: 22 DEGREES
CHLORIDE SERPL-SCNC: 106 MMOL/L (ref 100–111)
CO2 SERPL-SCNC: 29 MMOL/L (ref 21–32)
CREAT SERPL-MCNC: 0.85 MG/DL (ref 0.6–1.3)
DIAGNOSIS, 93000: NORMAL
GLUCOSE SERPL-MCNC: 84 MG/DL (ref 74–99)
P-R INTERVAL, ECG05: 144 MS
POTASSIUM SERPL-SCNC: 4 MMOL/L (ref 3.5–5.5)
Q-T INTERVAL, ECG07: 380 MS
QRS DURATION, ECG06: 76 MS
QTC CALCULATION (BEZET), ECG08: 472 MS
SODIUM SERPL-SCNC: 140 MMOL/L (ref 136–145)
VENTRICULAR RATE, ECG03: 93 BPM

## 2020-10-02 PROCEDURE — 87635 SARS-COV-2 COVID-19 AMP PRB: CPT

## 2020-10-02 PROCEDURE — 80048 BASIC METABOLIC PNL TOTAL CA: CPT

## 2020-10-02 PROCEDURE — 36415 COLL VENOUS BLD VENIPUNCTURE: CPT

## 2020-10-02 PROCEDURE — 93005 ELECTROCARDIOGRAM TRACING: CPT

## 2020-10-02 PROCEDURE — 71046 X-RAY EXAM CHEST 2 VIEWS: CPT

## 2020-10-04 LAB — SARS-COV-2, COV2NT: NOT DETECTED

## 2020-10-05 ENCOUNTER — OFFICE VISIT (OUTPATIENT)
Dept: ORTHOPEDIC SURGERY | Age: 55
End: 2020-10-05

## 2020-10-05 VITALS
DIASTOLIC BLOOD PRESSURE: 91 MMHG | SYSTOLIC BLOOD PRESSURE: 136 MMHG | HEART RATE: 103 BPM | TEMPERATURE: 97.3 F | WEIGHT: 202.8 LBS | OXYGEN SATURATION: 99 % | RESPIRATION RATE: 16 BRPM | HEIGHT: 65 IN | BODY MASS INDEX: 33.79 KG/M2

## 2020-10-05 DIAGNOSIS — G89.18 ACUTE POST-OPERATIVE PAIN: ICD-10-CM

## 2020-10-05 DIAGNOSIS — Z01.818 PREOP GENERAL PHYSICAL EXAM: Primary | ICD-10-CM

## 2020-10-05 DIAGNOSIS — G56.01 CARPAL TUNNEL SYNDROME, RIGHT: ICD-10-CM

## 2020-10-05 RX ORDER — OXYCODONE AND ACETAMINOPHEN 5; 325 MG/1; MG/1
1 TABLET ORAL
Qty: 28 TAB | Refills: 0 | Status: SHIPPED | OUTPATIENT
Start: 2020-10-05 | End: 2020-10-12

## 2020-10-05 RX ORDER — PROMETHAZINE HYDROCHLORIDE 25 MG/1
25 TABLET ORAL
Qty: 30 TAB | Refills: 0 | Status: SHIPPED | OUTPATIENT
Start: 2020-10-05

## 2020-10-05 NOTE — PROGRESS NOTES
Ms. Francois Bullock returns to the office for history and physical in preparation for upcoming right hand open carpal tunnel release. Please see attached forms in epic for history physical and review of systems.

## 2020-10-05 NOTE — H&P
History and Physical    59-year-old -American female seen in preparation for her right hand open carpal tunnel release. Review of Systems   Constitutional: Positive for activity change. Decreased use of the right hand secondary to carpal tunnel syndrome   HENT: Negative for ear pain. Eyes: Negative for pain. Respiratory: Negative for cough and shortness of breath. Cardiovascular: Negative for chest pain. Gastrointestinal: Negative for nausea and vomiting. Genitourinary: Positive for flank pain. Musculoskeletal: Positive for arthralgias, joint swelling and myalgias. Skin: Negative. Neurological: Positive for weakness and numbness. Associated with right hand carpal tunnel syndrome   Psychiatric/Behavioral: The patient is not nervous/anxious. Physical Exam  Vitals signs and nursing note reviewed. Constitutional:       Appearance: Normal appearance. She is obese. HENT:      Head: Normocephalic and atraumatic. Eyes:      Pupils: Pupils are equal, round, and reactive to light. Cardiovascular:      Pulses: Normal pulses. Pulmonary:      Effort: Pulmonary effort is normal.   Musculoskeletal:      Right hand: Decreased sensation noted. Decreased sensation is present in the medial distribution. Decreased strength noted. She exhibits finger abduction and thumb/finger opposition. Hands:    Skin:     Capillary Refill: Capillary refill takes less than 2 seconds. Neurological:      General: No focal deficit present. Mental Status: She is alert and oriented to person, place, and time. Mental status is at baseline. Right hand carpal tunnel syndrome    Right hand weakness secondary to above    Plan: Right hand open carpal tunnel release    Risk / Benefit: Surgeon will discuss in \"face to face\" encounter on day of surgery.     Family History and Surgical History reviewed, discussed in detail, and deemed Non-Contributory in preparation for this surgical encounter.

## 2020-10-08 DIAGNOSIS — G56.01 CARPAL TUNNEL SYNDROME, RIGHT: Primary | ICD-10-CM

## 2020-10-09 ENCOUNTER — OFFICE VISIT (OUTPATIENT)
Dept: ORTHOPEDIC SURGERY | Age: 55
End: 2020-10-09
Payer: OTHER GOVERNMENT

## 2020-10-09 VITALS — WEIGHT: 206.4 LBS | BODY MASS INDEX: 34.35 KG/M2 | TEMPERATURE: 96.8 F

## 2020-10-09 DIAGNOSIS — G89.18 POSTOPERATIVE PAIN: ICD-10-CM

## 2020-10-09 DIAGNOSIS — Z98.890 S/P CARPAL TUNNEL RELEASE: Primary | ICD-10-CM

## 2020-10-09 PROCEDURE — 99024 POSTOP FOLLOW-UP VISIT: CPT | Performed by: PHYSICIAN ASSISTANT

## 2020-10-09 NOTE — PROGRESS NOTES
Ms. Radha Phelps returns status post her right hand open carpal tunnel release. Surgical dressings removed today to reveal the proximal pole surgical site absent of a interrupted vertical mattress suture. There is noted subcutaneous fat protruding from the site. No surrounding erythema soft tissue going bracketing the site is noted however. Dried bloody drainage noted throughout as well. Otherwise remainder the surgical site is closed with interrupted vertical mattress sutures. Wound site measures over the  volar wrist/distal forearm 3 cm [id number]. Wound borders essentially well approximated except for the proximal pole. Procedure: Using clean technique a silver nitrate stick used to cauterize the area after removal of the subcutaneous fat extrusion. Bleeding was controlled. Patient placed in a volar splint. She will change her dressing daily. She can avoid getting the surgical site wet. We will see her back in a week for wound check and likely suture removal.  Today all of her questions answered to her satisfaction.

## 2020-10-16 ENCOUNTER — OFFICE VISIT (OUTPATIENT)
Dept: ORTHOPEDIC SURGERY | Age: 55
End: 2020-10-16
Payer: OTHER GOVERNMENT

## 2020-10-16 VITALS
TEMPERATURE: 97.2 F | OXYGEN SATURATION: 99 % | SYSTOLIC BLOOD PRESSURE: 127 MMHG | HEIGHT: 65 IN | HEART RATE: 93 BPM | RESPIRATION RATE: 16 BRPM | BODY MASS INDEX: 34.32 KG/M2 | WEIGHT: 206 LBS | DIASTOLIC BLOOD PRESSURE: 85 MMHG

## 2020-10-16 DIAGNOSIS — G56.01 CARPAL TUNNEL SYNDROME, RIGHT: ICD-10-CM

## 2020-10-16 DIAGNOSIS — Z98.890 S/P CARPAL TUNNEL RELEASE: Primary | ICD-10-CM

## 2020-10-16 PROCEDURE — 99024 POSTOP FOLLOW-UP VISIT: CPT | Performed by: ORTHOPAEDIC SURGERY

## 2020-10-16 RX ORDER — IBUPROFEN 800 MG/1
800 TABLET ORAL
Qty: 90 TAB | Refills: 2 | Status: SHIPPED | OUTPATIENT
Start: 2020-10-16 | End: 2020-12-29

## 2020-10-16 NOTE — PROGRESS NOTES
Umm Hernandez  1965     HISTORY OF PRESENT ILLNESS  Umm Hernandez is a 63 y.o. female who presents today for evaluation s/p right wrist carpal tunnel release on 10/7/20. Pt rates she pain 7/10 today. She has been compliant with her brace and restrictions. She is here for her regular follow up and suture removal. Patient denies any fever, chills, chest pain, shortness of breath or calf pain. There are no new illness or injuries to report since last seen in the office. PHYSICAL EXAM:   Visit Vitals  /85 (BP 1 Location: Left arm, BP Patient Position: Sitting)   Pulse 93   Temp 97.2 °F (36.2 °C)   Resp 16   Ht 5' 5\" (1.651 m)   Wt 206 lb (93.4 kg)   SpO2 99%   BMI 34.28 kg/m²      The patient is a well-developed, well-nourished female in no acute distress. The patient is alert and oriented times three. The patient appears to be well groomed. Mood and affect are normal.  ORTHOPEDIC EXAM of right wrist:  Inspection: mild swelling, no bruising  Incision, clean, dry, intact, sutures in place  Range of motion: able to gentle open and close a fist  ttp incisional  Stability: Stable  Strength: N/A    IMPRESSION:      ICD-10-CM ICD-9-CM    1. S/P carpal tunnel release  Z98.890 V45.89    2. Carpal tunnel syndrome, right  G56.01 354.0         PLAN:   Pt doing well post operatively  Incision cleaned.  Sutures removed and steri strips applied  Pt not given a refill of pain medications today  RTC 3 weeks        CECILIA Collins [facility]

## 2020-10-26 ENCOUNTER — OFFICE VISIT (OUTPATIENT)
Dept: ORTHOPEDIC SURGERY | Age: 55
End: 2020-10-26
Payer: OTHER GOVERNMENT

## 2020-10-26 VITALS — TEMPERATURE: 96.7 F

## 2020-10-26 DIAGNOSIS — Z98.890 S/P CARPAL TUNNEL RELEASE: Primary | ICD-10-CM

## 2020-10-26 PROCEDURE — 99024 POSTOP FOLLOW-UP VISIT: CPT | Performed by: PHYSICIAN ASSISTANT

## 2020-10-26 NOTE — PROGRESS NOTES
Ms. Nikki Syed returns to the office status post just over a month right hand open carpal tunnel release. She had some difficulty with her sutures when they were removed a few weeks ago. She has had pain in the area of the surgical site since then. She is wearing her volar splint. She has not improved entirely regarding the numbness tingling that was present in the thumb index and long finger of the right hand when compared to prior to surgery. On exam today there is a healed surgical incision over the volar aspect of the proximal right wrist distal forearm. Incision measures 3.5 cm. Dry skin is noted throughout with significant callus associated throughout the center of the previously identified surgical site. No evidence of fluid accumulations purulence no drainage site is clean and dry. Procedural: Using clean technique and #15 and a #11 blade as well as a pickup the wound site was debrided thoroughly of the callus and dried unviable skin. No complications developed. Sterile Band-Aid was placed. Patient is good to go back in her volar splint. She is good to begin occupational therapy per protocol. Follow-up with our office on a as needed basis. She may wash the hand normally and use a pumice type stone to decrease some of the rough area of callus which was left around the borders of the surgical incision. No retained suture material found.

## 2020-11-11 ENCOUNTER — HOSPITAL ENCOUNTER (OUTPATIENT)
Dept: PHYSICAL THERAPY | Age: 55
Discharge: HOME | End: 2020-11-11
Payer: OTHER GOVERNMENT

## 2020-11-11 PROCEDURE — 97166 OT EVAL MOD COMPLEX 45 MIN: CPT

## 2020-11-11 PROCEDURE — 97018 PARAFFIN BATH THERAPY: CPT

## 2020-11-11 NOTE — PROGRESS NOTES
OT DAILY TREATMENT NOTE     Patient Name: Gin Jordan  Date:2020  : 1965  [x]  Patient  Verified  Payor:  / Plan: Pa [address] / Product Type:  /    In time:11:21  Out time:12:10  Total Treatment Time (min): 49  Visit #: 1 of 8    Treatment Area: Carpal tunnel syndrome [G56.00]    SUBJECTIVE  Pain Level (0-10 scale): 9/10  Any medication changes, allergies to medications, adverse drug reactions, diagnosis change, or new procedure performed?: [x] No    [] Yes (see summary sheet for update)  Subjective functional status/changes:   [] No changes reported  \"My right hand hurts all of the time. \" Pt reports that night time is the worst pain. Pt reports wearing a brace on right hand at night. \"Heat makes it feel better\". Pt reports using a heating pad and sink full of hot water to manage pain.      OBJECTIVE    Modality rationale: decrease edema, decrease inflammation, decrease pain and increase tissue extensibility to improve the patients ability to  and grasp   Min Type Additional Details    [] Estim:  []Unatt       []IFC  []Premod                        []Other:  []w/ice   []w/heat  Position:  Location:    [] Estim: []Att    []TENS instruct  []NMES                    []Other:  []w/US   []w/ice   []w/heat  Position:  Location:    []  Traction: [] Cervical       []Lumbar                       [] Prone          []Supine                       []Intermittent   []Continuous Lbs:  [] before manual  [] after manual    []  Ultrasound: []Continuous   [] Pulsed                           []1MHz   []3MHz W/cm2:  Location:    []  Iontophoresis with dexamethasone         Location: [] Take home patch   [] In clinic   10 []  Ice     []  heat  []  Ice massage  []  Laser   [x]  Paraffin Position: Open hand   Location: Right hand/wrist    []  Laser with stim  []  Other:  Position:  Location:    []  Vasopneumatic Device Pressure:       [] lo [] med [] hi   Temperature: [] lo [] med [] hi     [x] Skin assessment post-treatment:  []intact [x]redness- no adverse reaction    []redness - adverse reaction:     39 min [x]Eval                  []Re-Eval     With   [x] TE   [] TA   [] neuro   [] other: Patient Education: [x] Review HEP  Demonstrated Tendon Glides for right hand ROM; given for HEP. Advised to use pain as guide and take a break if sharp pain. Pulling feeling is fine. [] Progressed/Changed HEP based on:   [] positioning   [] body mechanics   [] transfers   [] heat/ice application   [] Splint wear/care   [] Sensory re-education   [] scar management      [] other:           Other Objective/Functional Measures:     Subjective: Pt is a right hand dominant, 63 y.o., female who started to have right hand and wrist pain in 2016. In the years following, pt reports developing bilateral trigger finger and required surgery in 2018 and 2019. In Sept 2020, pt had testing for right hand and was diagnosed with CTS. Pt received 2 injections for pain and had CTR surgery on 10/7/2020. Pt scheduled to see referring physician on 11/16/2020. 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 Pt required some help from  for self care, home care, due to ongoing nature of pt's hand pain. I driving.  is primary cook. Pain level:(0-no pain 10-debilitating pain) severe    Description/Location: right hand with c/o no relief. Worst pain 9/10 Least pain 5/10   Activities which aggravate pain: moving it. Activities which ease pain: medications and heat. Current functional limitations/living situation: Lives with . Most everything is troublesome because pt is right handed. Pt requires help from  to hook bra, get dressed, and do light meal preparation. Pt likes to attend [employer].       Medical hx: Lupus, bilateral Carpal Tunnel Syndrome, bilateral trigger finger, arthritis, latex allergy, and asthma. Medications: See the written copy of this report in the patient's paper medical record. Objective:  Edema: Circumference    Right  Left   Styolid Level  16cm  15.5cm    Sensation:   Diminished light touch for middle finger. Diminished protective touch for thumb, ring, and small finger. Loss of protective touch on index finger. Light Touch []WFL          [x] Impaired (see above)   Sharp/Dull []WFL          [] Impaired  Not tested   Pain/Temperature []WFL          [] Impaired  Not tested     Range of Motion:     Active     Norms Right Left   Wrist Flex 0-80 10p 40    Ext 0-70 10p 28    Ulnar Dev 0-30   0p 25    Radial Dev 0-20   0p 12     Hand/Digit ROM: Right hand  4.5cm from Distal Dalton Crease of right hand to middle finger. Hand Strength:   Gross Grasp 3pt Pinch Lateral Pinch Tip Pinch   Right  14p 2p 2p 1p   Left 41 4 4 3     Nine-Hole Peg Test:  Left= ___22__seconds  Right=_33____seconds (to remove pegs from box only)    Palpation of Right hand/wrist: Hypersensitive to touch at incision, volar palm, and including 2 inches proximal to incision. ADLs  Feeding:   Uses left hand     []MaxA   []ModA   [x]Michael   [] CGA   []SBA   []Dima   []Independent  UE Dressing:  does bra.       []MaxA   []ModA   [x]Michael   [] CGA   []SBA   []Dima   []Independent  LE Dressing:       []MaxA   []ModA   [x]Michael   [] CGA   []SBA   []Dima   []Independent  Grooming: left      []MaxA   []ModA   [x]Michael   [] CGA   []SBA   []Dima   []Independent  Toileting: uses left hand       []MaxA   []ModA   [x]Michael   [] CGA   []SBA   []Dima   []Independent  Bathing:       []MaxA   []ModA   [x]Michael   [] CGA   []SBA   []Dima   []Independent  Light Meal Prep:    []MaxA   []ModA   [x]Michael   [] CGA   []SBA   []Dima   []Independent  Household/Other: []MaxA   []ModA   [x]Michael   [] CGA   []SBA   []Dima   []Independent  Adaptive Equip:     []MaxA   []ModA   [x]Michael   [] CGA   []SBA   []Dima   []Independent  Driving:  usually drives      []MaxA   []ModA   [x]Michael   [] CGA   []SBA   []Dima   []Independent  Money Mgmt: writing with left hand instead       []MaxA   []ModA   [x]Michael   [] CGA   []SBA   []Dima   []Independent       Pain Level (0-10 scale) post treatment: 9/10     [x]  See Plan of Care  []  See progress note/recertification  []  See Discharge Summary     Estella CHAPMAN 11/11/2020  10:17 AM    Judy Ross OTR/L    Future Appointments   Date Time Provider Serenity Gordon   11/11/2020 11:15 AM Shan White OTR/L [facility]   11/16/2020  9:15 AM Juan Diego Roberts PA-C [facility]

## 2020-11-11 NOTE — PROGRESS NOTES
[facility] - [facility] Devoid  [address]  [phone] fax    Plan of Care/Statement of Necessity for Occupational Therapy Services    Patient name: Natalie Butterfield Start of Care: 2020   Referral source: Dilia Guillaume : 1965    Medical Diagnosis: Carpal tunnel syndrome [G56.00]  Payor:  / Plan: Pa [address] / Product Type:  /  Onset Date: about 4 years ago ()   Treatment Diagnosis: Right hand and wrist pain; Decreased ROM   Prior Hospitalization: see medical history Provider#: [id number]   Medications: Verified on Patient summary List    Comorbidities: Lupus, bilateral Carpal Tunnel Syndrome, bilateral trigger finger, arthritis, latex allergy, and asthma. Prior Level of Function: Employed at [employer] to [employer] and [employer].  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 Pt required some help from  for self care, home care, due to ongoing nature of pt's hand pain. I driving.  is primary cook. The Plan of Care and following information is based on the information from the initial evaluation. Assessment/ key information:   Pt is a right hand dominant, 63 y.o., female who started to have right hand and wrist pain in . In the years following, pt reports developing bilateral trigger finger and required surgery in  and . In 2020, pt had testing for right hand and was diagnosed with CTS. Pt received 2 injections for pain and had CTR surgery on 10/7/2020. Pt scheduled to see referring physician on 2020. Pt reports worst pain of 9/10 and least pain of 5/10 with rating of 9/10 at time of evaluation. Pt has severe impairment with performing self care activities and carrying, handling, and moving items with right hand as demonstrated by 27/100 FOTO score.  Pt has minimal edema in right wrist.  In the right hand, pt has diminished light touch in middle finger, diminished protective sensation for thumb, ring and small finger, and loss of protective sensation on index finger. Pt has decreased ROM in right wrist (Flexion 10, extension 10, ulnar deviation 0 and radial deviation 0) and reports pain with all right wrist movements. Pt is unable to make a fist with right hand. Pt's  and pinch strength is significantly diminished (right  14# with 2# for 3 point pinch and lateral pinch, and 1# for tip pinch) and causes pain to test.  Pt demonstrated slowed fine motor skills with right hand (9 hole 33 seconds to remove pegs only) compared to left hand (9 hole 22 seconds). When palpating right volar wrist and palm, pt reports hypersensitivity. Patient would benefit from skilled occupation therapy for the right hand by decreasing pain and hypersensitivity in hand, increasing wrist and digit ROM, increasing pinch and  strength, and improving fine motor skills in order to perform self care tasks such as dressing, grooming, and light meal preparation. Evaluation Complexity: History MEDIUM Complexity : Expanded review of history including physical, cognitive and psychosocial  history  Examination MEDIUM Complexity : 3-5 performance deficits relating to physical, cognitive , or psychosocial skils that result in activity limitations and / or participation restrictions Clinical Decision Making MEDIUM Complexity : Patient may present with comorbidities that affect occupational performnce.  Miniml to moderate modification of tasks or assistance (eg, physical or verbal ) with assesment(s) is necessary to enable patient to complete evaluation   Overall Complexity Rating: MEDIUM    Problem List: Pain effecting function, Decreased range of motion, Decreased strength, Edema effecting function, Decreased coordination/prehension and Decreased ADL/functional abilities      Treatment Plan may include any combination of the following: Therapeutic exercise, Therapeutic activities, Physical agent/modality, Scar management, Manual therapy, Splinting/orthoses, Patient education and ADLs/IADLs    Patient / Family readiness to learn indicated by: asking questions and interest    Persons(s) to be included in education:   patient (P)    Barriers to Learning/Limitations: yes;  emotional Pt is guarded with right hand and fearful of hand being touched. Patient Goal (s): to use hand more    Patient Self Reported Health Status: good    Rehabilitation Potential: fair    Short Term Goals: To be accomplished in 2 weeks:  1. Patient will be independent in ROM and desensitization training for right hand/wrist and digits. 2. Patient will be able to make a loose fist to perform self care tasks as measured by 2.0cm or less distance between distal alvarez crease of right hand to middle finger tip. 3. Patient will report pain in right hand to be 0-4/10. Long Term Goals: To be accomplished in 4 weeks:   1. Patient will be able to close hand tightly into a fist to secure items. 2. Patient will increase right  strength by 10# allowing for pt to carry a light shopping bag. 3. Patient will increase fine motor skills for manipulating items in hand as shown by completion of 9 hole peg test with right hand within 30 seconds. 4. Patient will improve ability to groom her own hair and perform other self care activities as shown by 25 point improvement in FOTO score. Frequency / Duration: Patient to be seen 2 times per week for 4 weeks:    Patient/ Caregiver education and instruction: Diagnosis, prognosis, self care, activity modification and exercises    [x]  Plan of care has been reviewed with Dean CHAPMAN  11/11/2020 12:53 PM    Shelton ALFARO/DEE    _____________________________________________________________________    I certify that the above Therapy Services are being furnished while the patient is under my care.  I agree with the treatment plan and certify that this therapy is necessary.     Physician's Signature:____________Date:_________TIME:________    ** Signature, Date and Time must be completed for valid certification **    Please sign and return to [facility] - [facility]   [address] [facility]  [phone] fax

## 2020-11-16 ENCOUNTER — OFFICE VISIT (OUTPATIENT)
Dept: ORTHOPEDIC SURGERY | Age: 55
End: 2020-11-16
Payer: OTHER GOVERNMENT

## 2020-11-16 VITALS
OXYGEN SATURATION: 96 % | SYSTOLIC BLOOD PRESSURE: 122 MMHG | TEMPERATURE: 96.8 F | BODY MASS INDEX: 34.92 KG/M2 | HEIGHT: 65 IN | WEIGHT: 209.6 LBS | HEART RATE: 94 BPM | DIASTOLIC BLOOD PRESSURE: 82 MMHG

## 2020-11-16 DIAGNOSIS — Z48.89 ENCOUNTER FOR POSTOPERATIVE WOUND CHECK: ICD-10-CM

## 2020-11-16 DIAGNOSIS — Z98.890 S/P CARPAL TUNNEL RELEASE: Primary | ICD-10-CM

## 2020-11-16 PROCEDURE — 99024 POSTOP FOLLOW-UP VISIT: CPT | Performed by: PHYSICIAN ASSISTANT

## 2020-11-16 NOTE — PROGRESS NOTES
Ms. Harry Cabello returns status post her right hand open carpal tunnel release. She is continuing outpatient OT. She has developed some keloid at the surgical site. She has pain when she is wearing her volar splint. She still experiencing bouts of numbness tingling associated with her thumb index long and partial of her ring finger of the volar aspect. Today we discussed alternatives to care to include current OT as well as some light padding to the area of the surgical site to protect when she is wearing the volar splint. A small split 2 x 2 sponge was placed today with a piece of tape that did not prove her contact associated pain while in the volar splint. Patient has some guarded range of motion deficit on flexion of all digits of the right hand noting a 2 cm palm to pulp deficit. Her extension is full. Plan: Patient is getting continue OT. She will follow with our office in about a month. She is cautioned to refrain from any heavy push pull lift carry no more than 1 to 3 pounds with the right upper extremity. All of her questions answered to her satisfaction.

## 2020-11-18 ENCOUNTER — HOSPITAL ENCOUNTER (OUTPATIENT)
Dept: PHYSICAL THERAPY | Age: 55
Discharge: HOME | End: 2020-11-18
Payer: OTHER GOVERNMENT

## 2020-11-18 PROCEDURE — 97530 THERAPEUTIC ACTIVITIES: CPT

## 2020-11-18 PROCEDURE — 97110 THERAPEUTIC EXERCISES: CPT

## 2020-11-18 PROCEDURE — 97018 PARAFFIN BATH THERAPY: CPT

## 2020-11-18 PROCEDURE — 97140 MANUAL THERAPY 1/> REGIONS: CPT

## 2020-11-18 NOTE — PROGRESS NOTES
OT DAILY TREATMENT NOTE - [facility]     Patient Name: Cary Evans  Date:2020  : 1965  [x]  Patient  Verified  Payor: MIR / Plan: Pa [address] / Product Type:  /    In time: 9:00  Out time: 9:45  Total Treatment Time (min): 45  Total Timed Codes (min): 45  1:1 Treatment Time ([facility] only): 45   Visit #: 2 of 10    Treatment Area: Right wrist pain [M25.531]  Pain in right hand [M79.641]    SUBJECTIVE  Pain Level (0-10 scale):   9/10  Any medication changes, allergies to medications, adverse drug reactions, diagnosis change, or new procedure performed?: [x] No    [] Yes (see summary sheet for update)  Subjective functional status/changes:   [] No changes reported    \" My hand and wrist really hurts\"  \"Went to the doctor  due to pain\"   \"Use heat at home and elevate it\"   \"Very stiff and painful at night\"     OBJECTIVE    Modality rationale: decrease pain and increase tissue extensibility to improve the patients wrist and digit ROM.     Min Type Additional Details    [] Estim:  []Unatt       []IFC  []Premod                        []Other:  []w/ice   []w/heat  Position:  Location:    [] Estim: []Att    []TENS instruct  []NMES                    []Other:  []w/US   []w/ice   []w/heat  Position:  Location:    []  Traction: [] Cervical       []Lumbar                       [] Prone          []Supine                       []Intermittent   []Continuous Lbs:  [] before manual  [] after manual    []  Ultrasound: []Continuous   [] Pulsed                           []1MHz   []3MHz W/cm2:  Location:    []  Iontophoresis with dexamethasone         Location: [] Take home patch   [] In clinic    []  Ice     []  heat  []  Ice massage  []  Laser   []  Anodyne Position:  Location:   10 minutes []  Laser with stim  [x]  Other: Paraffin  Position: Seated  Location: Right hand    []  Vasopneumatic Device Pressure:       [] lo [] med [] hi   Temperature: [] lo [] med [] hi   [x] Skin assessment post-treatment:  [x]intact [x]redness- no adverse reaction    []redness - adverse reaction:     15 min Therapeutic Exercise:  [] See flow sheet :   Rationale: increase ROM to improve the patients ability to flex digits and flex/extend wrist without pain/discomfort. Right wrist/hand:     Tendon glides x3 . Completed positions 1-3. Wrist flex/extension no fist x10 each. 12 min Therapeutic Activity:  []  See flow sheet :   Rationale: Patient education  to improve the patients ability to self manage. Education and review of heat modalities. Education on edema and scar management. 8 min Manual Therapy:  Soft tissue massage   Rationale: increase ROM, increase tissue extensibility and decrease edema  to improve patient's ability to self-manage. Right hand:    Soft tissue massage on all digits to decrease edema. Scar massage to decrease hypersensitivity in CTR incision site. With   [] TE   [] TA   [] neuro   [] other: Patient Education: [x] Review HEP    [] Progressed/Changed HEP based on:   [] positioning   [] body mechanics   [] transfers   [] heat/ice application   [] Splint wear/care   [] Sensory re-education   [] scar management      [] other:            Other Objective/Functional Measures:     Demonstrated gaurding of right hand by placing hand on chest after each activity. Patient unable to complete all positions for tendon glides due to pain and discomfort. Edema Measurement (Right Hand)   11/18/2020 DATE Change     DPC 20.5cm                        Pain Level (0-10 scale) post treatment:  9 /10    ASSESSMENT/Changes in Function:     Patient benefits from education on diagnosis and modalities for self-management. Patient has decreased digit ROM due to pain/discomfort. Patient benefits from moist heat/paraffin and soft tissue massage for tissue extensibility.       Patient will continue to benefit from skilled OT services to address ROM deficits and analyze and address soft tissue restrictions to attain remaining goals. [x]  See Plan of Care  []  See progress note/recertification  []  See Discharge Summary         Progress towards goals / Updated goals:    Short Term Goals: To be accomplished in 2 weeks:  1. Patient will be independent in ROM and desensitization training for right hand/wrist and digits. 11/18/2020- Patient participated in tendon glides. Unable to perform all positions due to pain/discomfort. Progressing with completion of positions 1-3. Patient participated in wrist flexion/extension ROM HEP. Patient was provided with education and demonstration on desensitization of hand/ CTR incision site with a soft tissue scar massage. 2. Patient will be able to make a loose fist to perform self care tasks as measured by 2.0cm or less distance between distal alvarez crease of right hand to middle finger tip.   11/18/2020- Patient's distal alvarez crease edema measured at 20.5cm. Patient was unable to make a loose fist.     3. Patient will report pain in right hand to be 0-4/10.   11/18/2020- Patient reported pain at 9/10 at start and end of therapy.      Long Term Goals: To be accomplished in 4 weeks:              1. Patient will be able to close hand tightly into a fist to secure items. 2. Patient will increase right  strength by 10# allowing for pt to carry a light shopping bag. 3. Patient will increase fine motor skills for manipulating items in hand as shown by completion of 9 hole peg test with right hand within 30 seconds. 4. Patient will improve ability to groom her own hair and perform other self care activities as shown by 25 point improvement in FOTO score.     PLAN  []  Upgrade activities as tolerated     [x]  Continue plan of care  []  Update interventions per flow sheet       []  Discharge due to:_  []  Other:_      DHARMESH Solano-Applicant 30/33/1655  9:34 AM    Future Appointments   Date Time Provider Serenity Gordon   11/18/2020  9:00 AM Dani Luong Alisha Roberts [facility]   12/16/2020 10:15 AM Enrique Roberts PA-C [facility]

## 2020-12-04 ENCOUNTER — APPOINTMENT (OUTPATIENT)
Dept: PHYSICAL THERAPY | Age: 55
End: 2020-12-04
Payer: OTHER GOVERNMENT

## 2020-12-11 ENCOUNTER — HOSPITAL ENCOUNTER (OUTPATIENT)
Dept: PHYSICAL THERAPY | Age: 55
Discharge: HOME | End: 2020-12-11
Payer: OTHER GOVERNMENT

## 2020-12-11 PROCEDURE — 97530 THERAPEUTIC ACTIVITIES: CPT

## 2020-12-11 PROCEDURE — 97110 THERAPEUTIC EXERCISES: CPT

## 2020-12-11 PROCEDURE — 97018 PARAFFIN BATH THERAPY: CPT

## 2020-12-11 NOTE — PROGRESS NOTES
[facility] Doyle Serna  [address] [facility]  [phone] fax    Occupational Therapy Progress Note  Patient name: Carol Scott Start of Care: 20   Referral source: Carleen Huertas : 1965   Medical/Treatment Diagnosis: Right wrist pain [M25.531]  Pain in right hand [M79.641]  Payor: /  Onset Date:About 4 years ago     Prior Hospitalization: see medical history Provider#: [id number]   Medications: Verified on Patient Summary List    Comorbidities:  Lupus, bilateral Carpal Tunnel Syndrome, bilateral trigger finger, arthritis, latex allergy, and asthma. Prior Level of Function:Employed at [employer] to [employer].  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 Pt required some help from  for self care, home care, due to ongoing nature of pt's hand pain. I driving.  is primary cook.                                                                    Visits from Start of Care: 3    Missed Visits: 1    Established Goals:         Excellent           Good         Limited           None  [x] Increased ROM   []  []  [x]  []  [x] Increased Strength  []  []  [x]  []  [] Increased Mobility  []  []  []  []   [x] Decreased Pain   []  []  []  [x]  [x] Decreased Swelling  []  []  [x]  []  [x] Increased Fine Motor Skills []  []  [x]  []  [x] Increased ADL Union []  []  [x]  []    Key Functional Changes: Patient seen twice since eval due to scheduling issues. Patient has ongoing high levels of pain being reported. Patient is also hypersensitive to touch. Sensory re-ed has been initiated, as well as Tendon Glides and self edema massage.      Edema Measurement (Right Hand)    2020 Change   DPC 20.5cm  19.8 -0.7                     Range of Motion:                        Eval        Norms Right Left Right   Wrist Flex 0-80 10p 40 28p     Ext 0-70 10p 28 40p     Ulnar Dev 0-30   0p 25 10p     Radial Dev 0-20   0p 12 10p       Measurements: Taken with Jaspreet Dynamometer, in Lbs   Level 2 11/11  Eval 12/11 Change   Right 14p 15p* +1   Left 41          * Tested on one attempt only due to pain     Pinch Measurements: Taken with Pinch Gauge, in Lbs   (hand) 11/11  Eval 12/11 Change   3 pt         Right 2p 3p +1   Left  4                           Lateral         Right 2p 3p +1   Left 4                           Tip         Right 1p 2p +1   Left 3                              9 Hole    11/11  Eval 12/11 Change   Right 33* 14* 58%   Left 22           * Removal of pegs only     FOTO    11/11  Eval 12/11   Score 27 34   Change   +8            Progress towards goals / Updated goals:  1. Patient will be independent in ROM and desensitization training for right hand/wrist and digits. Status at Eval: Tendon Glides initiated at eval  Current Status (12/11): Max Pain with attempts at movement, sensory re-ed kit administered on this date      2. Patient will be able to make a loose fist to perform self care tasks as measured by 2.0cm or less distance between distal alvarez crease of right hand to middle finger tip.   Status at Eval: Unable  Current Status (12/11): Patient's distal alvarez crease edema measured at 20.5cm. Patient was unable to make a loose fist.      3. Patient will report pain in right hand to be 0-4/10.   Status at Eval: 9/10  Current Status (12/11): 9/10     Long Term Goals: To be accomplished in 4 weeks:              1. Patient will be able to close hand tightly into a fist to secure items. Status at Eval: Unable  Current Status (12/11): Unable     2. Patient will increase right  strength by 10# allowing for pt to carry a light shopping bag. Status at Eval: 14  Current Status (12/11): 15 (+1)     3. Patient will increase fine motor skills for manipulating items in hand as shown by completion of 9 hole peg test with right hand within 30 seconds.    Status at Eval: 33 for removal only  Current Status (12/11): 14 for removal only, unable to complete peg placement portion of test      4. Patient will improve ability to groom her own hair and perform other self care activities as shown by 25 point improvement in FOTO score. Status at Eval: 27  Current Status (12/11):  34    Updated Goals: to be achieved in 4 weeks: (Goals Carried over)          Progress towards goals / Updated goals:  1. Patient will be independent in ROM and desensitization training for right hand/wrist and digits. Status at PN : Max Pain with attempts at movement, sensory re-ed kit administered on this date      2. Patient will be able to make a loose fist to perform self care tasks as measured by 2.0cm or less distance between distal alvarez crease of right hand to middle finger tip.   Status at PN:  Patient's distal alvarez crease edema measured at 20.5cm. Patient was unable to make a loose fist.      3. Patient will report pain in right hand to be 0-4/10.   Status at PN: 9/10     Long Term Goals: To be accomplished in 4 weeks:              1. Patient will be able to close hand tightly into a fist to secure items. Status at PN: Unable    2. Patient will increase right  strength by 10# allowing for pt to carry a light shopping bag. Status at PN:  15 (+1)     3. Patient will increase fine motor skills for manipulating items in hand as shown by completion of 9 hole peg test with right hand within 30 seconds. Status at PN: 14 for removal only, unable to complete peg placement portion of test      4. Patient will improve ability to groom her own hair and perform other self care activities as shown by 25 point improvement in FOTO score.   Status at PN:   34 (+8)    ASSESSMENT/RECOMMENDATIONS:  [x]Continue therapy per initial plan/protocol at a frequency of  2 x per week for 4 weeks  []Continue therapy with the following recommended changes:_____________________ _____________________________________________________________________  []Discontinue therapy progressing towards or have reached established goals  []Discontinue therapy due to lack of appreciable progress towards goals  []Discontinue therapy due to lack of attendance or compliance  []Await Physician's recommendations/decisions regarding therapy  []Other:________________________________________________________________    Thank you for this referral.   JOSH Blair  12/11/2020 11:30 AM  NOTE TO PHYSICIAN:  PLEASE COMPLETE THE ORDERS BELOW AND   FAX TO [facility]: [phone]  If you are unable to process this request in 24 hours please contact our office: [phone]    ? I have read the above report and request that my patient continue as recommended. ? I have read the above report and request that my patient continue therapy with the following changes/special instructions:________________________________________________________  ? I have read the above report and request that my patient be discharged from therapy.     [de-identified] Signature:____________Date:_________TIME:________    [employer], Date and Time must be completed for valid certification **

## 2020-12-11 NOTE — PROGRESS NOTES
OT DAILY TREATMENT NOTE 10-18    Patient Name: Kateryna Duran  Date:2020  : 1965  [x]  Patient  Verified  Payor: /    In time:1028  Out time:1120  Total Treatment Time (min): 52  Visit #: 3 of 10    Treatment Area: Right wrist pain [M25.531]  Pain in right hand [M79.641]    SUBJECTIVE  Pain Level (0-10 scale): 10/10  Any medication changes, allergies to medications, adverse drug reactions, diagnosis change, or new procedure performed?: [x] No    [] Yes (see summary sheet for update)  Subjective functional status/changes:   [] No changes reported  Patient reports 10/10 pain and hypersensitivity    OBJECTIVE  Modality rationale: decrease pain and increase tissue extensibility to improve the patients wrist and digit ROM. Min Type Additional Details     []? Estim:  []? Unatt       []? IFC  []? Premod                        []?Other:  []?w/ice   []?w/heat  Position:  Location:     []? Estim: []? Att    []? TENS instruct  []? NMES                    []?Other:  []?w/US   []?w/ice   []?w/heat  Position:  Location:     []? Traction: []? Cervical       []? Lumbar                       []? Prone          []? Supine                       []?Intermittent   []? Continuous Lbs:  []? before manual  []? after manual     []? Ultrasound: []? Continuous   []? Pulsed                           []? 1MHz   []? 3MHz W/cm2:  Location:     []? Iontophoresis with dexamethasone         Location: []? Take home patch   []? In clinic     []? Ice     []?  heat  []? Ice massage  []? Laser   []? Anodyne Position:  Location:   10 minutes []? Laser with stim  [x]? Other: Paraffin  Position: Seated  Location: Right hand     []? Vasopneumatic Device Pressure:       []? lo []? med []? hi   Temperature: []? lo []? med []? hi   [x]? Skin assessment post-treatment:  [x]? intact [x]? redness- no adverse reaction    []? redness  adverse reaction:     27 min Therapeutic Exercise:  [] See flow sheet :   Rationale: increase ROM and increase strength to improve the patients ability to , grasp    Recheck PN    15 min Therapeutic Activity:  []  See flow sheet :   Rationale: increase ROM, increase strength and improve coordination  to improve the patients ability to grasp    Water Transfer Activity: patient used Right hand, to transfer soft therapy foam sponge from tub of warm water to measuring cup, and then squeezed out water. Sensory Kit      With   [] TE   [] TA   [] neuro   [] other: Patient Education: [x] Review HEP    [] Progressed/Changed HEP based on:   [] positioning   [] body mechanics   [] transfers   [] heat/ice application   [] Splint wear/care   [] Sensory re-education   [] scar management      [] other:            Other Objective/Functional Measures:     Edema Measurement (Right Hand)    11/18/2020 12/11 Change   DPC 20.5cm  19.8 -0.7                     Range of Motion:                11/11 Eval 12/11       Norms Right Left Right   Wrist Flex 0-80 10p 40 28p     Ext 0-70 10p 28 40p     Ulnar Dev 0-30   0p 25 10p     Radial Dev 0-20   0p 12 10p      Measurements: Taken with Jaspreet Dynamometer, in Lbs   Level 2 11/11 Eval 12/11 Change   Right 14p 15p* +1   Left 41        * Tested on one attempt only due to pain    Pinch Measurements: Taken with Pinch Gauge, in Lbs   (hand) 11/11 Eval 12/11 Change   3 pt      Right 2p 3p +1   Left  4                   Lateral      Right 2p 3p +1   Left 4                   Tip      Right 1p 2p +1   Left 3                           9 Hole   11/11  Eval 12/11 Change   Right 33* 14* 58   Left 22         * Removal of pegs only    FOTO   11/11  Eval 12/11   Score 27 34   Change  +8       Pain Level (0-10 scale) post treatment: 9/10    ASSESSMENT/Changes in Function: Sensory re-ed kit administered, able to tolerate cotton ball only at this time with discomfort. Able to perform warm water transfer at slow pace.      Patient will continue to benefit from skilled OT services to modify and progress therapeutic interventions, address ROM deficits, address strength deficits and instruct in home and community integration to attain remaining goals. []  See Plan of Care  []  See progress note/recertification  []  See Discharge Summary         Progress towards goals / Updated goals:  1. Patient will be independent in ROM and desensitization training for right hand/wrist and digits. Status at Eval: Tendon Glides initiated at eval  Current Status (12/11): Max Pain with attempts at movement, sensory re-ed kit administered on this date      2. Patient will be able to make a loose fist to perform self care tasks as measured by 2.0cm or less distance between distal alvarez crease of right hand to middle finger tip. Status at Eval: Unable  Current Status (12/11): Patient's distal alvarez crease edema measured at 20.5cm. Patient was unable to make a loose fist.      3. Patient will report pain in right hand to be 0-4/10.   Status at Eval: 9/10  Current Status (12/11): 9/10     Long Term Goals: To be accomplished in 4 weeks:              1. Patient will be able to close hand tightly into a fist to secure items. Status at Eval: Unable  Current Status (12/11): Unable    2. Patient will increase right  strength by 10# allowing for pt to carry a light shopping bag. Status at Eval: 14  Current Status (12/11): 15 (+1)    3. Patient will increase fine motor skills for manipulating items in hand as shown by completion of 9 hole peg test with right hand within 30 seconds. Status at Eval: 33 for removal only  Current Status (12/11): 14 for removal only, unable to complete peg placement portion of test     4. Patient will improve ability to groom her own hair and perform other self care activities as shown by 25 point improvement in FOTO score.   Status at Eval: 27  Current Status (12/11):  34    PLAN  []  Upgrade activities as tolerated     [x]  Continue plan of care  []  Update interventions per flow sheet       [] Discharge due to:_  []  Other:_      JOSH Bolanos 12/11/2020  10:18 AM    Future Appointments   Date Time Provider Serenity Gordon   12/11/2020 10:30 AM Sudha GUEVARA [facility]   12/21/2020  8:30 AM Sofiya Roberts PA-C [facility] BS AMB

## 2020-12-16 ENCOUNTER — APPOINTMENT (OUTPATIENT)
Dept: PHYSICAL THERAPY | Age: 55
End: 2020-12-16
Payer: OTHER GOVERNMENT

## 2020-12-17 ENCOUNTER — APPOINTMENT (OUTPATIENT)
Dept: PHYSICAL THERAPY | Age: 55
End: 2020-12-17
Payer: OTHER GOVERNMENT

## 2020-12-21 ENCOUNTER — OFFICE VISIT (OUTPATIENT)
Dept: ORTHOPEDIC SURGERY | Age: 55
End: 2020-12-21
Payer: OTHER GOVERNMENT

## 2020-12-21 VITALS
RESPIRATION RATE: 14 BRPM | TEMPERATURE: 97.6 F | WEIGHT: 210 LBS | OXYGEN SATURATION: 97 % | HEART RATE: 83 BPM | DIASTOLIC BLOOD PRESSURE: 75 MMHG | BODY MASS INDEX: 34.99 KG/M2 | HEIGHT: 65 IN | SYSTOLIC BLOOD PRESSURE: 114 MMHG

## 2020-12-21 DIAGNOSIS — G56.02 CARPAL TUNNEL SYNDROME, LEFT: Primary | ICD-10-CM

## 2020-12-21 PROCEDURE — 99024 POSTOP FOLLOW-UP VISIT: CPT | Performed by: PHYSICIAN ASSISTANT

## 2020-12-21 RX ORDER — EPINEPHRINE 0.3 MG/.3ML
0.3 INJECTION SUBCUTANEOUS
COMMUNITY

## 2020-12-21 NOTE — PROGRESS NOTES
Ko Persaud returns just over 2 months status post her right hand open carpal tunnel release. She is having some difficulty with OT. Her she is progressing slowly. She is wearing her volar splint without the volar stay. She is continuing OT. Her surgical site is healed nicely. She is guarded with her range of motion actively lacking a 4 cm palm to pulp deficit on all fingers of the right hand. She has full extension. There is hypersensitivity bracketing the surgical site noted on exam today with no evidence of cellulitis skin breakdown or infection. Two-point discrimination associated with the median nerve tract of the right hand untested today based on patient guarding. Patient was provided a volar splint for her left upper extremity noting a diagnosis confirmed of carpal tunnel syndrome. She is getting continue occupational therapy per protocol. Her op report was again reviewed and care and attention associated to the indication that the nerve bundle was hyperemic and noted longstanding compression. Today all the patient's questions were answered to her satisfaction. Follow-up in 6 weeks.

## 2020-12-23 ENCOUNTER — HOSPITAL ENCOUNTER (OUTPATIENT)
Dept: PHYSICAL THERAPY | Age: 55
Discharge: HOME | End: 2020-12-23
Payer: OTHER GOVERNMENT

## 2020-12-23 PROCEDURE — 97530 THERAPEUTIC ACTIVITIES: CPT

## 2020-12-23 PROCEDURE — 97018 PARAFFIN BATH THERAPY: CPT

## 2020-12-23 PROCEDURE — 97110 THERAPEUTIC EXERCISES: CPT

## 2020-12-23 NOTE — PROGRESS NOTES
OT DAILY TREATMENT NOTE     Patient Name: Cary Evans  Date:2020  : 1965  [x]  Patient  Verified  Payor:  / Plan: [facility] / Product Type: Kita Poser /    In time:905  Out time:950  Total Treatment Time (min): 45  Visit #: 1 of 8    Treatment Area: Right wrist pain [M25.531]  Pain in right hand [M79.641]    SUBJECTIVE  Pain Level (0-10 scale): 610  Any medication changes, allergies to medications, adverse drug reactions, diagnosis change, or new procedure performed?: [] No    [x] Yes (see summary sheet for update)  Subjective functional status/changes:   [] No changes reported  MD gave me a shot in my left wrist  He said that it may take longer for me to get better because I had the problem for so long    OBJECTIVE    Modality rationale: increase tissue extensibility to improve the patients ability to grasp   Min Type Additional Details    [] Estim:  []Unatt       []IFC  []Premod                        []Other:  []w/ice   []w/heat  Position:  Location:    [] Estim: []Att    []TENS instruct  []NMES                    []Other:  []w/US   []w/ice   []w/heat  Position:  Location:    []  Traction: [] Cervical       []Lumbar                       [] Prone          []Supine                       []Intermittent   []Continuous Lbs:  [] before manual  [] after manual    []  Ultrasound: []Continuous   [] Pulsed                           []1MHz   []3MHz W/cm2:  Location:    []  Iontophoresis with dexamethasone         Location: [] Take home patch   [] In clinic         10 []  Ice     []  heat  []  Ice massage  []  Laser   [x]  Paraffin Position:  Location:right hand    []  Laser with stim  []  Other:  Position:  Location:    []  Vasopneumatic Device Pressure:       [] lo [] med [] hi   Temperature: [] lo [] med [] hi       [x] Skin assessment post-treatment:  [x]intact []redness- no adverse reaction    []redness  adverse reaction:       20 min Therapeutic Exercise:  [] See flow sheet :   Rationale: increase ROM and increase strength to improve the patients ability to grasp  Soft putty HEP  Towel scrunch x 3    15 min Therapeutic Activity:  []  See flow sheet :   Rationale: increase ROM and increase strength  to improve the patients ability to manipulate items  Puff ball gather and release one by one using thumb  Review of importance of sensory input ot palm    With   [] TE   [] TA   [] neuro   [] other: Patient Education: [x] Review HEP    [] Progressed/Changed HEP based on: soft putty  [] positioning   [] body mechanics   [] transfers   [] heat/ice application   [] Splint wear/care   [] Sensory re-education   [] scar management      [] other: cylindrical foam use on utensils           Other Objective/Functional Measures:   Able to gather handful of puff balls with right hand  Palm very red with minimal activity     Pain Level (0-10 scale) post treatment: 6/10    ASSESSMENT/Changes in Function: Ongoing hypersensitivity to touch, poor hand use, not using hand for feeding, oral care    Patient will continue to benefit from skilled OT services to modify and progress therapeutic interventions, address ROM deficits, address strength deficits, analyze and address soft tissue restrictions, analyze and cue movement patterns and instruct in home and community integration to attain remaining goals. []  See Plan of Care  []  See progress note/recertification  []  See Discharge Summary         Progress towards goals / Updated goals:  1. Patient will be independent in ROM and desensitization training for right hand/wrist and digits.   Status at PN : Max Pain with attempts at movement, sensory re-ed kit administered on this date   12/23/2020 soft putty for input to pal, gathering puff balls     2. Patient will be able to make a loose fist to perform self care tasks as measured by 2.0cm or less distance between distal alvarez crease of right hand to middle finger tip.   Status at PN:  Patient's distal alvarez crease edema measured at 20.5cm. Patient was unable to make a loose fist.   12/23/2020 able to gather puff balls, instructed to get plumbing insulation for fork     3. Patient will report pain in right hand to be 0-4/10.   Status at PN: 9/10  12/23/2020 shown descriptions, pain 6/10     Long Term Goals: To be accomplished in 4 weeks:              1. Patient will be able to close hand tightly into a fist to secure items.   Status at PN: Unable     2. Patient will increase right  strength by 10# allowing for pt to carry a light shopping bag.   Status at PN:  15 (+1)     3. Patient will increase fine motor skills for manipulating items in hand as shown by completion of 9 hole peg test with right hand within 30 seconds.   Status at PN: 14 for removal only, unable to complete peg placement portion of test      4. Patient will improve ability to groom her own hair and perform other self care activities as shown by 25 point improvement in FOTO score.   Status at PN:   34 (+8)       PLAN  []  Upgrade activities as tolerated     []  Continue plan of care  []  Update interventions per flow sheet       []  Discharge due to:_  []  Other:_      DOMINIC Johnson/L 12/23/2020  9:14 AM    Future Appointments   Date Time Provider Serenity Gordon   12/29/2020 12:45 PM Jenn Kat [id number] [facility]   12/31/2020  9:45 AM Jenn TRAORESHTRF [facility]   1/4/2021  9:00 AM Kari Davis OTR/L MMCPTPB [facility]   1/6/2021  9:45 AM Kari Davis OTR/L MMCPTPB [facility]   2/1/2021  8:45 AM Allie Roberts PA-C [facility] AM[facility]

## 2020-12-29 ENCOUNTER — TELEPHONE (OUTPATIENT)
Dept: PHYSICAL THERAPY | Age: 55
End: 2020-12-29

## 2020-12-29 RX ORDER — IBUPROFEN 800 MG/1
TABLET ORAL
Qty: 90 TAB | Refills: 1 | Status: SHIPPED | OUTPATIENT
Start: 2020-12-29

## 2020-12-31 ENCOUNTER — TELEPHONE (OUTPATIENT)
Dept: PHYSICAL THERAPY | Age: 55
End: 2020-12-31

## 2021-01-04 ENCOUNTER — TELEPHONE (OUTPATIENT)
Dept: PHYSICAL THERAPY | Age: 56
End: 2021-01-04

## 2021-01-06 NOTE — PROGRESS NOTES
[facility] Meaghan Ceron  [address] [facility]  [phone] fax    Occupational Therapy Discharge Summary    Patient name: Gómez Rangel Start of Care: 20   Referral source: Catherine Holloway : 1965   Medical/Treatment Diagnosis: Right wrist pain [M25.531]  Pain in right hand [M79.641]  Payor:  / Plan: [address] / Product Type:  /  Onset Date:4 years ago, surgery 10/7/2020     Prior Hospitalization: see medical history Provider#: [id number]   Medications: Verified on Patient Summary List    Comorbidities: *Lupus, bilateral Carpal Tunnel Syndrome, bilateral trigger finger, arthritis, latex allergy, and asthma  Prior Level of Function:*Employed at [employer] to [employer].  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 Pt required some help from  for self care, home care, due to ongoing nature of pt's hand pain. I driving.  is primary cook.                                                               Visits from Start of Care: 4   Missed Visits: 4  Reporting Period : 2020-2021     Summary of Care:Patient was seen for modalities, therapeutic exercises and activities to improve hand function. S/he has     1. Patient will be independent in ROM and desensitization training for right hand/wrist and digits. Status at PN : Max Pain with attempts at movement, sensory re-ed kit administered on this date   Discharge status; progressing,  soft putty for input to palm, gathering puff balls     2. Patient will be able to make a loose fist to perform self care tasks as measured by 2.0cm or less distance between distal alvarez crease of right hand to middle finger tip.   Status at PN:  Patient's distal alvarez crease edema measured at 20.5cm.  Patient was unable to make a loose fist.   Discharge status: Progressing, able to gather puff balls, instructed to get plumbing insulation for fork     3. Patient will report pain in right hand to be 0-4/10.   Status at PN: 9/10  Discharge status: Progressing pain 6/10     Long Term Goals: To be accomplished in 4 weeks:              1. Patient will be able to close hand tightly into a fist to secure items.   Status at PN: Unable  Discharge status; not met     2. Patient will increase right  strength by 10# allowing for pt to carry a light shopping bag.   Status at PN:  15 (+1)  Discharge status; Not reassessed due to unplanned discharge     3. Patient will increase fine motor skills for manipulating items in hand as shown by completion of 9 hole peg test with right hand within 30 seconds.   Status at PN: 14 for removal only, unable to complete peg placement portion of test   Discharge status: Not reassessed due to unplanned discharge     4. Patient will improve ability to groom her own hair and perform other self care activities as shown by 25 point improvement in FOTO score. Status at PN:   34 (+8)  Discharge status: Not reassessed due to unplanned discharge   .      ASSESSMENT/Changes in Function: Patient made limited gains in pain relief and functional status with poor compliance and no shows for last 4 scheduled visits. Being discharged for non-compliance.       ASSESSMENT/RECOMMENDATIONS:  [x]Discontinue therapy: []Patient has reached or is progressing toward set goals      [x]Patient is non-compliant or has abdicated      []Due to lack of appreciable progress towards set goals    DOMINIC Clement/L 1/6/2021 10:24 AM

## 2021-02-08 ENCOUNTER — OFFICE VISIT (OUTPATIENT)
Dept: ORTHOPEDIC SURGERY | Age: 56
End: 2021-02-08
Payer: OTHER GOVERNMENT

## 2021-02-08 ENCOUNTER — TELEPHONE (OUTPATIENT)
Dept: ORTHOPEDIC SURGERY | Age: 56
End: 2021-02-08

## 2021-02-08 VITALS — TEMPERATURE: 97.5 F | HEIGHT: 65 IN | BODY MASS INDEX: 34.99 KG/M2 | WEIGHT: 210 LBS

## 2021-02-08 DIAGNOSIS — Z98.890 S/P CARPAL TUNNEL RELEASE: Primary | ICD-10-CM

## 2021-02-08 PROCEDURE — 99212 OFFICE O/P EST SF 10 MIN: CPT | Performed by: PHYSICIAN ASSISTANT

## 2021-02-08 NOTE — TELEPHONE ENCOUNTER
Patient says we need to prepare a [employer]/ referral for her to see Dr. Severa Marking for her hand. She provided a fax # we can send this to for authorization, [phone].

## 2021-02-08 NOTE — PROGRESS NOTES
Samantha Cabello returns just over 4 months status post her right hand open carpal tunnel release. She had to discontinue OT due to a $30 co-pay per visit she is wearing her volar splint without the volar stay. She is continuing OT. Her surgical site has healed nicely. She is guarded with her range of motion actively lacking a 4 cm palm to pulp deficit on all fingers of the right hand. She has full extension. There is hypersensitivity bracketing the surgical site noted on exam today with no evidence of cellulitis skin breakdown or infection. Patient does have equipment provided by the OT for utilization with a home exercise program.  She will continue per HEP instructions. She did injure her left index finger over the past several days resulting in a deep cut with numbness associated over the ulnar side of the index finger tip. She has been to follow with Dr. Otilia Perez DO hand surgery [facility]. Today all the patient's questions were answered to her satisfaction. Follow-up as needed regarding the recent carpal tunnel release.

## 2021-02-08 NOTE — TELEPHONE ENCOUNTER
Spoke with patient and informed her a referral must be requested by her PCP on her behalf because this is a new issue (finger). The last one on file was for carpal tunnel. She was going to call [place] and her PCP to try and obtain a referral for her finger.

## 2021-06-24 ENCOUNTER — OFFICE VISIT (OUTPATIENT)
Dept: ORTHOPEDIC SURGERY | Age: 56
End: 2021-06-24
Payer: OTHER GOVERNMENT

## 2021-06-24 VITALS
OXYGEN SATURATION: 99 % | RESPIRATION RATE: 15 BRPM | HEART RATE: 105 BPM | WEIGHT: 210 LBS | BODY MASS INDEX: 34.99 KG/M2 | HEIGHT: 65 IN

## 2021-06-24 DIAGNOSIS — G89.29 CHRONIC PAIN OF LEFT KNEE: ICD-10-CM

## 2021-06-24 DIAGNOSIS — M25.561 CHRONIC PAIN OF RIGHT KNEE: Primary | ICD-10-CM

## 2021-06-24 DIAGNOSIS — G89.29 CHRONIC PAIN OF RIGHT KNEE: Primary | ICD-10-CM

## 2021-06-24 DIAGNOSIS — Z98.890 S/P CARPAL TUNNEL RELEASE: ICD-10-CM

## 2021-06-24 DIAGNOSIS — M17.12 PRIMARY OSTEOARTHRITIS OF LEFT KNEE: ICD-10-CM

## 2021-06-24 DIAGNOSIS — M17.11 PRIMARY OSTEOARTHRITIS OF RIGHT KNEE: ICD-10-CM

## 2021-06-24 DIAGNOSIS — M25.562 CHRONIC PAIN OF LEFT KNEE: ICD-10-CM

## 2021-06-24 PROCEDURE — 73562 X-RAY EXAM OF KNEE 3: CPT | Performed by: SPECIALIST

## 2021-06-24 PROCEDURE — 99213 OFFICE O/P EST LOW 20 MIN: CPT | Performed by: SPECIALIST

## 2021-06-24 PROCEDURE — 20610 DRAIN/INJ JOINT/BURSA W/O US: CPT | Performed by: SPECIALIST

## 2021-06-24 RX ORDER — BETAMETHASONE SODIUM PHOSPHATE AND BETAMETHASONE ACETATE 3; 3 MG/ML; MG/ML
6 INJECTION, SUSPENSION INTRA-ARTICULAR; INTRALESIONAL; INTRAMUSCULAR; SOFT TISSUE ONCE
Status: COMPLETED | OUTPATIENT
Start: 2021-06-24 | End: 2021-06-24

## 2021-06-24 RX ADMIN — BETAMETHASONE SODIUM PHOSPHATE AND BETAMETHASONE ACETATE 6 MG: 3; 3 INJECTION, SUSPENSION INTRA-ARTICULAR; INTRALESIONAL; INTRAMUSCULAR; SOFT TISSUE at 13:38
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
## 2021-06-29 ENCOUNTER — DOCUMENTATION ONLY (OUTPATIENT)
Dept: ORTHOPEDIC SURGERY | Age: 56
End: 2021-06-29

## 2021-10-20 ENCOUNTER — OFFICE VISIT (OUTPATIENT)
Dept: ORTHOPEDIC SURGERY | Age: 56
End: 2021-10-20
Payer: OTHER GOVERNMENT

## 2021-10-20 VITALS
WEIGHT: 208.8 LBS | HEIGHT: 65 IN | TEMPERATURE: 97.5 F | RESPIRATION RATE: 16 BRPM | BODY MASS INDEX: 34.79 KG/M2 | HEART RATE: 99 BPM | OXYGEN SATURATION: 98 %

## 2021-10-20 DIAGNOSIS — M79.671 FOOT PAIN, RIGHT: Primary | ICD-10-CM

## 2021-10-20 DIAGNOSIS — M72.2 PLANTAR FASCIITIS, RIGHT: ICD-10-CM

## 2021-10-20 PROCEDURE — 99213 OFFICE O/P EST LOW 20 MIN: CPT | Performed by: PHYSICIAN ASSISTANT

## 2021-10-20 PROCEDURE — 73630 X-RAY EXAM OF FOOT: CPT | Performed by: PHYSICIAN ASSISTANT

## 2021-10-27 NOTE — ED NOTES
7:00 PM :Pt care assumed from Dr. Su Zazueta , ED provider. Pt complaint(s), current treatment plan, progression and available diagnostic results have been discussed thoroughly. Rounding occurred: yes  Intended Disposition: TBD   Pending diagnostic reports and/or labs (please list): Sx control       10:19 PM I have assessed the patient and discussed her results and diagnosis. Pt feels better and would like to be discharged. Lungs are clear. Abdomen is soft and nontender. Pt is discharged is in stable condition. Patient will f/u with PCP. Patient is to return to emergency department if any new or worsening condition. Patient understands and verbalizes agreement with plan.
Pt reports feeling much better. Nausea has resolved. Abdominal pain has resolved. Ready for discharge.
Pt sleeping. awaiting CT results.
no

## 2021-11-19 ENCOUNTER — OFFICE VISIT (OUTPATIENT)
Dept: ORTHOPEDIC SURGERY | Age: 56
End: 2021-11-19
Payer: OTHER GOVERNMENT

## 2021-11-19 VITALS
OXYGEN SATURATION: 98 % | HEART RATE: 98 BPM | TEMPERATURE: 97.3 F | WEIGHT: 212.8 LBS | HEIGHT: 65 IN | BODY MASS INDEX: 35.45 KG/M2

## 2021-11-19 DIAGNOSIS — M72.2 PLANTAR FASCIITIS, RIGHT: Primary | ICD-10-CM

## 2021-11-19 DIAGNOSIS — M17.11 PRIMARY OSTEOARTHRITIS OF RIGHT KNEE: ICD-10-CM

## 2021-11-19 DIAGNOSIS — M25.561 CHRONIC PAIN OF RIGHT KNEE: ICD-10-CM

## 2021-11-19 DIAGNOSIS — G89.29 CHRONIC PAIN OF RIGHT KNEE: ICD-10-CM

## 2021-11-19 DIAGNOSIS — M79.671 FOOT PAIN, RIGHT: ICD-10-CM

## 2021-11-19 PROCEDURE — 99213 OFFICE O/P EST LOW 20 MIN: CPT | Performed by: SPECIALIST

## 2021-11-19 PROCEDURE — 20550 NJX 1 TENDON SHEATH/LIGAMENT: CPT | Performed by: SPECIALIST

## 2021-11-19 PROCEDURE — 20610 DRAIN/INJ JOINT/BURSA W/O US: CPT | Performed by: SPECIALIST

## 2021-11-19 RX ORDER — BETAMETHASONE SODIUM PHOSPHATE AND BETAMETHASONE ACETATE 3; 3 MG/ML; MG/ML
3 INJECTION, SUSPENSION INTRA-ARTICULAR; INTRALESIONAL; INTRAMUSCULAR; SOFT TISSUE ONCE
Status: COMPLETED | OUTPATIENT
Start: 2021-11-19 | End: 2021-11-19

## 2021-11-19 RX ADMIN — BETAMETHASONE SODIUM PHOSPHATE AND BETAMETHASONE ACETATE 3 MG: 3; 3 INJECTION, SUSPENSION INTRA-ARTICULAR; INTRALESIONAL; INTRAMUSCULAR; SOFT TISSUE at 11:50
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
## 2021-11-24 ENCOUNTER — OFFICE VISIT (OUTPATIENT)
Dept: ORTHOPEDIC SURGERY | Age: 56
End: 2021-11-24
Payer: OTHER GOVERNMENT

## 2021-11-24 VITALS
BODY MASS INDEX: 35.16 KG/M2 | HEART RATE: 86 BPM | HEIGHT: 65 IN | OXYGEN SATURATION: 98 % | TEMPERATURE: 97.5 F | WEIGHT: 211 LBS

## 2021-11-24 DIAGNOSIS — M25.561 CHRONIC PAIN OF RIGHT KNEE: ICD-10-CM

## 2021-11-24 DIAGNOSIS — G89.29 CHRONIC PAIN OF RIGHT KNEE: ICD-10-CM

## 2021-11-24 DIAGNOSIS — M17.11 PRIMARY OSTEOARTHRITIS OF RIGHT KNEE: Primary | ICD-10-CM

## 2021-11-24 PROCEDURE — 20610 DRAIN/INJ JOINT/BURSA W/O US: CPT | Performed by: SPECIALIST

## 2021-11-24 NOTE — PROGRESS NOTES
Patient: Judd Palacio                MRN: [id number]       SSN: [id number]  YOB: 1965        AGE: 61 y.o. SEX: female  Body mass index is 35.11 kg/m². PCP: Pola Irizarry DO  11/24/21    Chief Complaint   Patient presents with    Knee Pain     Right     HISTORY:  Judd Palacio is a 61 y.o. female who is seen for right knee pain. ICD-10-CM ICD-9-CM    1. Primary osteoarthritis of right knee  M17.11 715.16 sodium hyaluronate (SUPARTZ FX/EUFLEXXA/HYALGAN) 10 mg/mL injection syrg 20 mg      DRAIN/INJECT LARGE JOINT/BURSA   2. Chronic pain of right knee  M25.561 719.46 sodium hyaluronate (SUPARTZ FX/EUFLEXXA/HYALGAN) 10 mg/mL injection syrg 20 mg    G89.29 338.29 DRAIN/INJECT LARGE JOINT/BURSA       Chart reviewed for the following:   Mainor Fabian MD, have reviewed the History, Physical and updated the Allergic reactions for [address] performed immediately prior to start of procedure:  Mainor Fabian MD, have performed the following reviews on Judd Palacio prior to the start of the procedure:            * Patient was identified by name and date of birth   * Agreement on procedure being performed was verified  * Risks and Benefits explained to the patient  * Procedure site verified and marked as necessary  * Patient was positioned for comfort  * Consent was obtained     Time: 11:16 AM     Date of procedure: 11/24/2021    Procedure performed by:  Alda Cuadra MD    Ms. Camille Doran tolerated the procedure well with no complications. PLAN:  After discussing treatment options, patient's right knee was injected with 2 cc of Euflexxa. Ms. Camille Doran will follow up in one week to complete her visco supplementation injection series.       Scribed by MD Joao Jin) as dictated by Alda Cuadra MD

## 2021-12-02 ENCOUNTER — OFFICE VISIT (OUTPATIENT)
Dept: ORTHOPEDIC SURGERY | Age: 56
End: 2021-12-02
Payer: OTHER GOVERNMENT

## 2021-12-02 VITALS
WEIGHT: 211 LBS | BODY MASS INDEX: 36.02 KG/M2 | HEIGHT: 64 IN | OXYGEN SATURATION: 99 % | HEART RATE: 93 BPM | TEMPERATURE: 97.7 F

## 2021-12-02 DIAGNOSIS — M17.11 PRIMARY OSTEOARTHRITIS OF RIGHT KNEE: ICD-10-CM

## 2021-12-02 DIAGNOSIS — G89.29 CHRONIC PAIN OF RIGHT KNEE: Primary | ICD-10-CM

## 2021-12-02 DIAGNOSIS — M25.561 CHRONIC PAIN OF RIGHT KNEE: Primary | ICD-10-CM

## 2021-12-02 PROCEDURE — 20610 DRAIN/INJ JOINT/BURSA W/O US: CPT | Performed by: SPECIALIST

## 2021-12-02 NOTE — PROGRESS NOTES
Patient: Judd Palacio                MRN: [id number]       SSN: [id number]  YOB: 1965        AGE: 61 y.o. SEX: female  Body mass index is 36.22 kg/m². PCP: Pola Irizarry DO  12/02/21    Chief Complaint   Patient presents with    Knee Pain     Right     HISTORY:  Judd Palacio is a 61 y.o. female who is seen for right knee pain. TIME OUT performed immediately prior to start of procedure:  Mainor Fabian MD, have performed the following reviews on Judd Palacio prior to the start of the procedure:            * Patient was identified by name and date of birth   * Agreement on procedure being performed was verified  * Risks and Benefits explained to the patient  * Procedure site verified and marked as necessary  * Patient was positioned for comfort  * Consent was obtained     Time: 11:20 AM     Date of procedure: 12/2/2021    Procedure performed by:  Alda Cuadra MD    Ms. Camille Doran tolerated the procedure well with no complications      [id number] ICD-9-CM    1. Chronic pain of right knee  M25.561 719.46 sodium hyaluronate (SUPARTZ FX/EUFLEXXA/HYALGAN) 10 mg/mL injection syrg 20 mg    G89.29 338.29 DRAIN/INJECT LARGE JOINT/BURSA   2. Primary osteoarthritis of right knee  M17.11 715.16 sodium hyaluronate (SUPARTZ FX/EUFLEXXA/HYALGAN) 10 mg/mL injection syrg 20 mg      DRAIN/INJECT LARGE JOINT/BURSA     PLAN:  After discussing treatment options, patient's right knee was injected with 2 cc of Euflexxa. Ms. Camille Doran will follow up PRN now that she has completed her visco supplementation injection series.       Scribed by Alda Cuadra MD [address] [address]) as dictated by Alda Cuadra MD

## 2022-02-06 ENCOUNTER — HOSPITAL ENCOUNTER (EMERGENCY)
Age: 57
Discharge: HOME | End: 2022-02-06
Attending: STUDENT IN AN ORGANIZED HEALTH CARE EDUCATION/TRAINING PROGRAM
Payer: OTHER GOVERNMENT

## 2022-02-06 VITALS
RESPIRATION RATE: 22 BRPM | DIASTOLIC BLOOD PRESSURE: 96 MMHG | HEIGHT: 64 IN | SYSTOLIC BLOOD PRESSURE: 148 MMHG | HEART RATE: 116 BPM | BODY MASS INDEX: 36.19 KG/M2 | WEIGHT: 212 LBS | TEMPERATURE: 98.4 F | OXYGEN SATURATION: 99 %

## 2022-02-06 DIAGNOSIS — R51.9 ACUTE NONINTRACTABLE HEADACHE, UNSPECIFIED HEADACHE TYPE: Primary | ICD-10-CM

## 2022-02-06 LAB
ANION GAP SERPL CALC-SCNC: 5 MMOL/L (ref 3–18)
BASOPHILS # BLD: 0 K/UL (ref 0–0.1)
BASOPHILS NFR BLD: 0 % (ref 0–2)
BUN SERPL-MCNC: 17 MG/DL (ref 7–18)
BUN/CREAT SERPL: 18 (ref 12–20)
CALCIUM SERPL-MCNC: 9.4 MG/DL (ref 8.5–10.1)
CHLORIDE SERPL-SCNC: 108 MMOL/L (ref 100–111)
CO2 SERPL-SCNC: 26 MMOL/L (ref 21–32)
CREAT SERPL-MCNC: 0.93 MG/DL (ref 0.6–1.3)
CRP SERPL-MCNC: 1.2 MG/DL (ref 0–0.3)
DIFFERENTIAL METHOD BLD: ABNORMAL
EOSINOPHIL # BLD: 0.1 K/UL (ref 0–0.4)
EOSINOPHIL NFR BLD: 1 % (ref 0–5)
ERYTHROCYTE [DISTWIDTH] IN BLOOD BY AUTOMATED COUNT: 14.5 % (ref 11.6–14.5)
ERYTHROCYTE [SEDIMENTATION RATE] IN BLOOD: 9 MM/HR (ref 0–30)
GLUCOSE SERPL-MCNC: 143 MG/DL (ref 74–99)
HCT VFR BLD AUTO: 46.1 % (ref 35–45)
HGB BLD-MCNC: 15.3 G/DL (ref 12–16)
IMM GRANULOCYTES # BLD AUTO: 0 K/UL (ref 0–0.04)
IMM GRANULOCYTES NFR BLD AUTO: 0 % (ref 0–0.5)
LYMPHOCYTES # BLD: 2.2 K/UL (ref 0.9–3.6)
LYMPHOCYTES NFR BLD: 30 % (ref 21–52)
MCH RBC QN AUTO: 26.7 PG (ref 24–34)
MCHC RBC AUTO-ENTMCNC: 33.2 G/DL (ref 31–37)
MCV RBC AUTO: 80.5 FL (ref 78–100)
MONOCYTES # BLD: 0.5 K/UL (ref 0.05–1.2)
MONOCYTES NFR BLD: 7 % (ref 3–10)
NEUTS SEG # BLD: 4.6 K/UL (ref 1.8–8)
NEUTS SEG NFR BLD: 62 % (ref 40–73)
NRBC # BLD: 0 K/UL (ref 0–0.01)
NRBC BLD-RTO: 0 PER 100 WBC
PLATELET # BLD AUTO: 282 K/UL (ref 135–420)
PMV BLD AUTO: 10.7 FL (ref 9.2–11.8)
POTASSIUM SERPL-SCNC: 4 MMOL/L (ref 3.5–5.5)
RBC # BLD AUTO: 5.73 M/UL (ref 4.2–5.3)
SODIUM SERPL-SCNC: 139 MMOL/L (ref 136–145)
WBC # BLD AUTO: 7.4 K/UL (ref 4.6–13.2)

## 2022-02-06 PROCEDURE — 74011250637 HC RX REV CODE- 250/637: Performed by: STUDENT IN AN ORGANIZED HEALTH CARE EDUCATION/TRAINING PROGRAM

## 2022-02-06 PROCEDURE — 96365 THER/PROPH/DIAG IV INF INIT: CPT

## 2022-02-06 PROCEDURE — 83735 ASSAY OF MAGNESIUM: CPT

## 2022-02-06 PROCEDURE — 85025 COMPLETE CBC W/AUTO DIFF WBC: CPT

## 2022-02-06 PROCEDURE — 99284 EMERGENCY DEPT VISIT MOD MDM: CPT

## 2022-02-06 PROCEDURE — 80048 BASIC METABOLIC PNL TOTAL CA: CPT

## 2022-02-06 PROCEDURE — 86140 C-REACTIVE PROTEIN: CPT

## 2022-02-06 PROCEDURE — 85652 RBC SED RATE AUTOMATED: CPT

## 2022-02-06 PROCEDURE — 96375 TX/PRO/DX INJ NEW DRUG ADDON: CPT

## 2022-02-06 PROCEDURE — 74011250636 HC RX REV CODE- 250/636: Performed by: STUDENT IN AN ORGANIZED HEALTH CARE EDUCATION/TRAINING PROGRAM

## 2022-02-06 RX ORDER — DIPHENHYDRAMINE HYDROCHLORIDE 50 MG/ML
25 INJECTION, SOLUTION INTRAMUSCULAR; INTRAVENOUS ONCE
Status: COMPLETED | OUTPATIENT
Start: 2022-02-06 | End: 2022-02-06

## 2022-02-06 RX ORDER — PROCHLORPERAZINE EDISYLATE 5 MG/ML
10 INJECTION INTRAMUSCULAR; INTRAVENOUS ONCE
Status: COMPLETED | OUTPATIENT
Start: 2022-02-06 | End: 2022-02-06

## 2022-02-06 RX ORDER — MAGNESIUM SULFATE HEPTAHYDRATE 40 MG/ML
2 INJECTION, SOLUTION INTRAVENOUS ONCE
Status: COMPLETED | OUTPATIENT
Start: 2022-02-06 | End: 2022-02-06

## 2022-02-06 RX ORDER — ACETAMINOPHEN 500 MG
1000 TABLET ORAL ONCE
Status: COMPLETED | OUTPATIENT
Start: 2022-02-06 | End: 2022-02-06

## 2022-02-06 RX ADMIN — SODIUM CHLORIDE 1000 ML: 900 INJECTION, SOLUTION INTRAVENOUS at 15:48

## 2022-02-06 RX ADMIN — DIPHENHYDRAMINE HYDROCHLORIDE 25 MG: 50 INJECTION, SOLUTION INTRAMUSCULAR; INTRAVENOUS at 15:53

## 2022-02-06 RX ADMIN — MAGNESIUM SULFATE 2 G: 2 INJECTION INTRAVENOUS at 15:49

## 2022-02-06 RX ADMIN — PROCHLORPERAZINE EDISYLATE 10 MG: 5 INJECTION INTRAMUSCULAR; INTRAVENOUS at 15:50

## 2022-02-06 RX ADMIN — ACETAMINOPHEN 1000 MG: 500 TABLET ORAL at 15:52

## 2022-02-06 NOTE — ED PROVIDER NOTES
HPI   Patient is a 40-year-old female who presents for a 2-day history of headache. Patient feels the pain is a diffuse headache across her anterior forehead however feels it worse to the left temporal region. She is never had a headache like this before. Denies any trauma or injury. She is that she woke up with it the other day. It has been gradually worsening a lot worse at onset but denies any associated nausea or vomiting. Does feel like she is having vision changes in her left eye, but she feels a little blurry vision.   Past Medical History:   Diagnosis Date    Anemia     Asthma     BMI 34.0-34.9,adult 5/11/2017    Bowel obstruction (HCC)     Gall stones     GERD (gastroesophageal reflux disease)     History of blood transfusion     Hypertension     Lupus (HCC)     Sickle cell trait (HCC)        Past Surgical History:   Procedure Laterality Date    HAND/FINGER SURGERY UNLISTED      HX APPENDECTOMY      HX CHOLECYSTECTOMY      HX HERNIA REPAIR      HX TUBAL LIGATION      FL ABDOMEN SURGERY [address] UNLISTED  2006    surgery for bowel obstruction         Family History:   Problem Relation Age of Onset    Cancer Mother     Cancer Brother        Social History     Socioeconomic History    Marital status:      Spouse name: Not on file    Number of children: Not on file    Years of education: Not on file    Highest education level: Not on file   Occupational History    Not on file   Tobacco Use    Smoking status: Never Smoker    Smokeless tobacco: Never Used   Substance and Sexual Activity    Alcohol use: No    Drug use: No    Sexual activity: Not on file   Other Topics Concern    Not on file   Social History Narrative    Not on file     Social Determinants of Health     Financial Resource Strain:     Difficulty of Paying Living Expenses: Not on file   Food Insecurity:     Worried About Running Out of Food in the Last Year: Not on file    Hayley of Food in the Last Year: Not on file   Transportation Needs:     Lack of Transportation (Medical): Not on file    Lack of Transportation (Non-Medical):  Not on file   Physical Activity:     Days of Exercise per Week: Not on file    Minutes of Exercise per Session: Not on file   Stress:     Feeling of Stress : Not on file   Social Connections:     Frequency of Communication with Friends and Family: Not on file    Frequency of Social Gatherings with Friends and Family: Not on file    Attends Zoroastrian Services: Not on file    Active Member of [address] [place] [address] or Organizations: Not on file    Attends Club or Organization Meetings: Not on file    Marital Status: Not on file   Intimate Partner Violence:     Fear of Current or Ex-Partner: Not on file    Emotionally Abused: Not on file    Physically Abused: Not on file    Sexually Abused: Not on file   Housing Stability:     Unable to Pay for Housing in the Last Year: Not on file    Number of [place] in the Last Year: Not on file    Unstable Housing in the Last Year: Not on file         ALLERGIES: Iron, Other medication, Penicillins, Pomegranate, Sulfa (sulfonamide antibiotics), and Venofer [iron sucrose]    Review of Systems  Constitutional: No fever  HENT: No ear pain  Eyes: Positive for blurry vision  Respiratory: No SOB  Cardio: No chest pain  GI: No blood in stool  : No hematuria  MSK: No back pain  Skin: No rashes  Neuro: Positive for headache    Vitals:    02/06/22 1440   BP: (!) 148/96   Pulse: (!) 116   Resp: 22   Temp: 98.4 °F (36.9 °C)   SpO2: 99%   Weight: 96.2 kg (212 lb)   Height: 5' 4\" (1.626 m)            Physical Exam   General: No acute distress  Head: Normocephalic, atraumatic, mild tenderness to the left temple region  Psych: Cooperative and alert  Eyes: No scleral icterus, normal conjunctiva, extraocular motion intact  ENT: Moist oral mucosa  Neck: Supple, no nuchal rigidity, no carotid bruits  CV: Regular rate and rhythm, no pitting edema, palpable radial pulses  Pulm: Clear breath sounds bilaterally without any wheezing or rhonchi, normal respiratory rate  GI: Normal bowel sounds, soft, non-tender  MSK: Moves all four extremities  Skin: No rashes  Neuro: Face is symmetrical, tongue protrudes midline, vision and hearing grossly intact, normal speech, 5/5 strength bilateral upper and lower extremities, no pronator drift, finger-to-nose cerebellar testing is within normal limits, no extinction, sensation grossly intact, can name common objects, alert and oriented x4, negative Romberg, normal gait        MDM   Patient is a 58-year-old female who presents with headache and blurry vision. With her history of lupus, tachycardia and temporal pain I would like to rule out temporal arteritis in this patient. No concerning findings of acute intracranial trauma, meningitis or bleed at this time. Patient was treated with a migraine cocktail and evaluated. Has had multiple recent life stressors that may be contributing to worsening headache. Will check visual acuity. Visual acuity although poor is equal bilaterally. CBC, CMP, CRP and ESR are within normal limits. CRP is likely mildly elevated at 1.2 but not significant enough for temporal arteritis. Upon reexamination patient states that she is feeling much better and her headache is resolved after migraine cocktail. At this point she is no longer tachycardic, clinically appears well and therefore feel that she is stable for discharge. Patient stable for discharge at this time. Patient is in agreement with the plan to be discharged at this time. All the patient's questions were answered. Patient was given written instructions on the diagnosis, and states understanding of the plan moving forward. We did discuss important signs and symptoms that should prompt quick return to the emergency department. Disposition: Patient was discharged home in stable condition.   They will follow up with their primary care physician    Prescriptions: None    Diagnosis: Acute headache    Procedures

## 2022-02-06 NOTE — ED TRIAGE NOTES
Patient c/o left side headache that began yesterday at 1200. She states having wavy- blurry vision to left eye. She states taking Tylenol at 0800 this morning.

## 2022-02-06 NOTE — ED NOTES
meds admin per mar order. Pt teaching provided r/t usage, side effects, etc.  Lights dimmed to promote pain relief. Call bell in reach.

## 2022-02-06 NOTE — DISCHARGE INSTRUCTIONS
Take Tylenol and ibuprofen to help with your headache. Stay hydrated and drink plenty of fluids. Try and decrease stress in your life.

## 2022-02-06 NOTE — ED NOTES
Pt presents, a/o x 4, with c/o left temporal (indicated by pointing) and \"forehead) headache that began yesterday around \"lunch\". She reports after headache began she starting having \"left eye twitching; and when i'm looking at something like the wall, it's like the wall is moving\". Pt denies any falls/injuries. She reports \"my friend thinks I have blood pressure issues\". She admits to have new/recent stress in her life. She described pain as \"my whole body feeling anxious\". Pt reports sound increases pain in head.

## 2022-02-07 LAB — MAGNESIUM SERPL-MCNC: 2.1 MG/DL (ref 1.6–2.6)

## 2022-03-18 PROBLEM — E66.01 SEVERE OBESITY (HCC): Status: ACTIVE | Noted: 2019-08-23

## 2022-03-18 PROBLEM — K85.10 GALLSTONE PANCREATITIS: Status: ACTIVE | Noted: 2018-03-20

## 2022-03-19 PROBLEM — R94.31 PROLONGED Q-T INTERVAL ON ECG: Status: ACTIVE | Noted: 2017-10-18

## 2022-03-19 PROBLEM — K80.50 CHOLEDOCHOLITHIASIS: Status: ACTIVE | Noted: 2018-03-22

## 2022-03-19 PROBLEM — R07.9 CHEST PAIN: Status: ACTIVE | Noted: 2017-10-19

## 2022-03-19 PROBLEM — E86.0 DEHYDRATION: Status: ACTIVE | Noted: 2018-03-20

## 2022-03-20 PROBLEM — K85.10 ACUTE GALLSTONE PANCREATITIS: Status: ACTIVE | Noted: 2018-03-20

## 2023-01-23 ENCOUNTER — HOSPITAL ENCOUNTER (EMERGENCY)
Age: 58
Discharge: HOME | End: 2023-01-23
Attending: STUDENT IN AN ORGANIZED HEALTH CARE EDUCATION/TRAINING PROGRAM
Payer: OTHER GOVERNMENT

## 2023-01-23 VITALS
RESPIRATION RATE: 18 BRPM | SYSTOLIC BLOOD PRESSURE: 149 MMHG | TEMPERATURE: 97.6 F | BODY MASS INDEX: 28.51 KG/M2 | HEIGHT: 64 IN | DIASTOLIC BLOOD PRESSURE: 96 MMHG | OXYGEN SATURATION: 99 % | WEIGHT: 167 LBS | HEART RATE: 94 BPM

## 2023-01-23 DIAGNOSIS — I10 HYPERTENSION, UNSPECIFIED TYPE: Primary | ICD-10-CM

## 2023-01-23 PROCEDURE — 99282 EMERGENCY DEPT VISIT SF MDM: CPT

## 2023-01-24 NOTE — ED PROVIDER NOTES
EMERGENCY DEPARTMENT HISTORY AND PHYSICAL EXAM    8:25 PM      Date: 1/23/2023  Patient Name: Sadia Barbosa    History of Presenting Illness     Chief Complaint   Patient presents with    Hypertension         History Provided By: Patient    Additional History (Context): Sadia Barbosa is a 61 y.o. female with past medical history significant for sickle cell trait who presents with hypertension at various outpatient settings and headache for 1 week. PCP: Olivia Gómez, DO    Current Outpatient Medications   Medication Sig Dispense Refill    ibuprofen (MOTRIN) 800 mg tablet TAKE 1 TABLET BY MOUTH EVERY 8 HOURS AS NEEDED FOR PAIN 90 Tab 1    EPINEPHrine (EPIPEN) 0.3 mg/0.3 mL injection 0.3 mg by IntraMUSCular route once as needed for Allergic Response. promethazine (PHENERGAN) 25 mg tablet Take 1 Tab by mouth every six (6) hours as needed for Nausea. 30 Tab 0    potassium chloride (K-DUR, KLOR-CON) 20 mEq tablet Take  by mouth two (2) times a day. hydroxychloroquine (PLAQUENIL) 200 mg tablet 200 mg daily. ergocalciferol (ERGOCALCIFEROL) 50,000 unit capsule       albuterol-ipratropium (DUO-NEB) 2.5 mg-0.5 mg/3 ml nebu       benzonatate (TESSALON) 100 mg capsule       ondansetron (ZOFRAN ODT) 8 mg disintegrating tablet Take 1 Tab by mouth every eight (8) hours as needed for Nausea. 10 Tab 0    pantoprazole (PROTONIX) 40 mg tablet Take 1 Tab by mouth Daily (before breakfast). 7 Tab 0    fluticasone-salmeterol (ADVAIR DISKUS) 250-50 mcg/dose diskus inhaler Take 1 Puff by inhalation every twelve (12) hours. CYANOCOBALAMIN, VITAMIN B-12, (VITAMIN B-12 PO) Take  by mouth. albuterol (PROVENTIL, VENTOLIN) 90 mcg/actuation inhaler Take 1-2 Puffs by inhalation every four (4) hours as needed for Wheezing.  1 g 3       Past History     Past Medical History:  Past Medical History:   Diagnosis Date    Anemia     Asthma     BMI 34.0-34.9,adult 5/11/2017    Bowel obstruction ([address]     Gennette Pump stones     GERD (gastroesophageal reflux disease)     History of blood transfusion     Hypertension     Lupus (HCC)     Sickle cell trait (HCC)        Past Surgical History:  Past Surgical History:   Procedure Laterality Date    HX APPENDECTOMY      HX CHOLECYSTECTOMY      HX HERNIA REPAIR      HX TUBAL LIGATION      FL UNLISTED PROCEDURE ABDOMEN PERITONEUM & OMENTUM  2006    surgery for bowel obstruction    FL UNLISTED PROCEDURE HANDS/FINGERS         Family History:  Family History   Problem Relation Age of Onset    Cancer Mother     Cancer Brother        Social History:  Social History     Tobacco Use    Smoking status: Never    Smokeless tobacco: Never   Substance Use Topics    Alcohol use: No    Drug use: No       Allergies: Allergies   Allergen Reactions    Iron Anaphylaxis    Other Medication Nausea and Vomiting     All \"mycins\"    Penicillins Rash    Pomegranate Angioedema     Itching    Sulfa (Sulfonamide Antibiotics) Hives    Venofer [Iron Sucrose] Rash and Itching         Review of Systems       Review of Systems   Constitutional:  Negative for activity change, chills, diaphoresis, fatigue and fever. Respiratory:  Negative for chest tightness and shortness of breath. Cardiovascular:  Negative for chest pain, palpitations and leg swelling. Gastrointestinal:  Negative for nausea and vomiting. Neurological:  Positive for headaches. Pt reports headache is 8 out of 10, relieved with Tylenol PM, which allows her to sleep throughout the night 6 to 8 hours. Patient states headache is not severe enough to be the primary reason for her visit today. Physical Exam   Visit Vitals  BP (!) 149/96 (BP 1 Location: Right upper arm, BP Patient Position: Sitting)   Pulse 94   Temp 97.6 °F (36.4 °C)   Resp 18   Ht 5' 4\" (1.626 m)   Wt 75.8 kg (167 lb)   SpO2 99%   BMI 28.67 kg/m²         Physical Exam  Vitals reviewed. Constitutional:       General: She is not in acute distress.      Appearance: Normal appearance. She is not ill-appearing or toxic-appearing. Cardiovascular:      Rate and Rhythm: Normal rate and regular rhythm. Pulses: Normal pulses. Heart sounds: Normal heart sounds. Pulmonary:      Effort: Pulmonary effort is normal.   Skin:     General: Skin is warm and dry. Neurological:      Mental Status: She is alert and oriented to person, place, and time. Diagnostic Study Results     Labs -  No results found for this or any previous visit (from the past 12 hour(s)). Radiologic Studies -   No orders to display         Medical Decision Making   I am the first provider for this patient. I reviewed available nursing notes, past medical history, past surgical history, family history and social history. Vital Signs-Reviewed the patient's vital signs. Records Reviewed: Nursing Notes and Old Medical Records (Time of Review: 8:25 PM)    Pulse Oximetry Analysis - 99% on RA- normal       ED Course: Progress Notes, Reevaluation, and Consults:  8:25 PM  Initial assessment performed. The patients presenting problems have been discussed, and they/their family are in agreement with the care plan formulated and outlined with them. I have encouraged them to ask questions as they arise throughout their visit. Provider Notes (Medical Decision Making):     Stewart Jennings is a 61 y.o. female with past medical history significant for sickle cell trait who presents with hypertension at various outpatient settings and headache for 1 week. Highest reading was 166/107 at home today. In the ED blood pressure measured 149/98 with pulse of 94. Patient admits that she was active at home prior to obtaining that reading. Patient does not report visual disturbances associated with headache, not thunderclap presentation, not precipitated by exercise or position, not made worse with neck motions.     Patient has a primary care provider [facility] Dr. Korey Scott patient agrees to make appointment with PCP for management of hypertension. Will obtain appropriate studies to evaluate patient's complaints and treat symptomatically. Will disposition after reassessment assuming no clinical change or worsening and appropriate response to symptomatic treatment. Diagnosis     Clinical Impression:   1. Hypertension, unspecified type        Disposition: discharged     DISCHARGE NOTE:     Patient has been reexamined. Patient has no new complaints, changes, or physical findings. Care plan outlined and precautions discussed. All medications were reviewed with the patient; will discharge home with no medication changes. All of patient's questions and concerns were addressed. Patient was instructed and agrees to follow up with PCP, as well as to return to the ED upon further deterioration. Patient is ready to go home. Follow-up Information       Follow up With Specialties Details Why Rivas Ag DO Sports Medicine Physician Schedule an appointment as soon as possible for a visit in 1 week For management of hypertension [address] [place] [id number]  [phone]               Discharge Medication List as of 1/23/2023  8:28 PM            Dictation disclaimer:  Please note that this dictation was completed with [employer], the [employer] voice recognition software. Quite often unanticipated grammatical, syntax, homophones, and other interpretive errors are inadvertently transcribed by the computer software. Please disregard these errors. Please excuse any errors that have escaped final proofreading.

## 2023-01-24 NOTE — ED TRIAGE NOTES
Pt is ambulatory to triage with complaints of high blood pressure. Pt states she went to her PCP for her LUE pain on 1/9 and pressure was high, but states PCP attributed that to pain. Went to dentist today and it was high. Checked it at home and got the highest reading (166/107). Pt states has had a mild URRUTIA x1 week.

## 2023-01-30 ENCOUNTER — OFFICE VISIT (OUTPATIENT)
Dept: ORTHOPEDIC SURGERY | Age: 58
End: 2023-01-30
Payer: OTHER GOVERNMENT

## 2023-01-30 VITALS — HEART RATE: 93 BPM | WEIGHT: 217 LBS | TEMPERATURE: 97.3 F | OXYGEN SATURATION: 98 % | BODY MASS INDEX: 37.25 KG/M2

## 2023-01-30 DIAGNOSIS — M77.12 LATERAL EPICONDYLITIS, LEFT ELBOW: Primary | ICD-10-CM

## 2023-01-30 DIAGNOSIS — M19.042 OSTEOARTHRITIS OF METACARPOPHALANGEAL (MCP) JOINT OF LEFT THUMB: ICD-10-CM

## 2023-01-30 PROCEDURE — 99213 OFFICE O/P EST LOW 20 MIN: CPT | Performed by: SPECIALIST

## 2023-01-30 PROCEDURE — 20551 NJX 1 TENDON ORIGIN/INSJ: CPT | Performed by: SPECIALIST

## 2023-01-30 PROCEDURE — 20600 DRAIN/INJ JOINT/BURSA W/O US: CPT | Performed by: SPECIALIST

## 2023-01-30 RX ORDER — BETAMETHASONE SODIUM PHOSPHATE AND BETAMETHASONE ACETATE 3; 3 MG/ML; MG/ML
3 INJECTION, SUSPENSION INTRA-ARTICULAR; INTRALESIONAL; INTRAMUSCULAR; SOFT TISSUE ONCE
Status: COMPLETED | OUTPATIENT
Start: 2023-01-30 | End: 2023-01-30

## 2023-01-30 RX ADMIN — BETAMETHASONE SODIUM PHOSPHATE AND BETAMETHASONE ACETATE 3 MG: 3; 3 INJECTION, SUSPENSION INTRA-ARTICULAR; INTRALESIONAL; INTRAMUSCULAR; SOFT TISSUE at 10:03

## 2023-01-30 RX ADMIN — BETAMETHASONE SODIUM PHOSPHATE AND BETAMETHASONE ACETATE 3 MG: 3; 3 INJECTION, SUSPENSION INTRA-ARTICULAR; INTRALESIONAL; INTRAMUSCULAR; SOFT TISSUE at 10:02
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
## 2023-06-07 ENCOUNTER — HOSPITAL ENCOUNTER (EMERGENCY)
Facility: HOSPITAL | Age: 58
Discharge: HOME | End: 2023-06-07
Attending: STUDENT IN AN ORGANIZED HEALTH CARE EDUCATION/TRAINING PROGRAM
Payer: OTHER GOVERNMENT

## 2023-06-07 ENCOUNTER — APPOINTMENT (OUTPATIENT)
Facility: HOSPITAL | Age: 58
End: 2023-06-07
Payer: OTHER GOVERNMENT

## 2023-06-07 VITALS
HEART RATE: 95 BPM | RESPIRATION RATE: 16 BRPM | DIASTOLIC BLOOD PRESSURE: 95 MMHG | SYSTOLIC BLOOD PRESSURE: 150 MMHG | WEIGHT: 183 LBS | BODY MASS INDEX: 29.41 KG/M2 | HEIGHT: 66 IN | TEMPERATURE: 98.2 F | OXYGEN SATURATION: 99 %

## 2023-06-07 DIAGNOSIS — G89.29 CHRONIC PAIN OF RIGHT KNEE: Primary | ICD-10-CM

## 2023-06-07 DIAGNOSIS — M25.561 CHRONIC PAIN OF RIGHT KNEE: Primary | ICD-10-CM

## 2023-06-07 PROCEDURE — 99283 EMERGENCY DEPT VISIT LOW MDM: CPT

## 2023-06-07 PROCEDURE — 73562 X-RAY EXAM OF KNEE 3: CPT

## 2023-06-07 RX ORDER — ACETAMINOPHEN 500 MG
500 TABLET ORAL 4 TIMES DAILY PRN
Qty: 30 TABLET | Refills: 0 | Status: SHIPPED | OUTPATIENT
Start: 2023-06-07

## 2023-06-07 ASSESSMENT — ENCOUNTER SYMPTOMS
ABDOMINAL DISTENTION: 0
VOMITING: 0
RHINORRHEA: 0
ABDOMINAL PAIN: 0
NAUSEA: 0
DIARRHEA: 0

## 2023-06-07 ASSESSMENT — LIFESTYLE VARIABLES: HOW OFTEN DO YOU HAVE A DRINK CONTAINING ALCOHOL: NEVER

## 2023-06-07 ASSESSMENT — PAIN DESCRIPTION - ORIENTATION: ORIENTATION: RIGHT

## 2023-06-07 ASSESSMENT — PAIN DESCRIPTION - LOCATION: LOCATION: KNEE

## 2023-06-07 ASSESSMENT — PAIN - FUNCTIONAL ASSESSMENT: PAIN_FUNCTIONAL_ASSESSMENT: 0-10

## 2023-06-07 ASSESSMENT — PAIN SCALES - GENERAL: PAINLEVEL_OUTOF10: 8

## 2023-06-07 NOTE — ED TRIAGE NOTES
Pt to ED for eval of right knee pain/swelling. Pt was waiting for  referral to go through from PCP to see ortho but it hasn't gone through. Pt has increased pain and swelling to right knee.

## 2023-06-07 NOTE — ED PROVIDER NOTES
Resp:    Temp:    SpO2:      s      Records Reviewed: Past medical history     Provider Notes (Medical Decision Making):   Differential diagnosis septic arthritis, osteoarthritis, fracture, dislocation    50-year-old female presented ambulatory to the ED with nontraumatic right knee pain. On physical exam patient was afebrile nontachycardic in no acute distress. On examination of knee patient had no obvious bony deformity and had good range of motion and was ambulating at baseline without difficulty. Patient symptoms appear to be more chronic in nature and encouraged her to follow-up with orthopedic and symptomatic management. Patient was in understanding and agreement with plan. Patient was stable at time of discharge. ED Course:   Initial assessment performed. The patients presenting problems have been discussed, and they are in agreement with the care plan formulated and outlined with them. I have encouraged them to ask questions as they arise throughout their visit. Disposition:  Discharge    PLAN:  1.       Medication List        START taking these medications      acetaminophen 500 MG tablet  Commonly known as: TYLENOL  Take 1 tablet by mouth 4 times daily as needed for Pain            ASK your doctor about these medications      Advair Diskus 250-50 MCG/ACT Aepb diskus inhaler  Generic drug: fluticasone-salmeterol     albuterol sulfate  (90 Base) MCG/ACT inhaler  Commonly known as: PROVENTIL;VENTOLIN;PROAIR     benzonatate 100 MG capsule  Commonly known as: TESSALON     EPINEPHrine 0.3 MG/0.3ML Soaj injection  Commonly known as: EPIPEN     ergocalciferol 1.25 MG (50269 UT) capsule  Commonly known as: ERGOCALCIFEROL     hydroxychloroquine 200 MG tablet  Commonly known as: PLAQUENIL     ibuprofen 800 MG tablet  Commonly known as: ADVIL;MOTRIN     ipratropium 0.5 mg-albuterol 2.5 mg 0.5-2.5 (3) MG/3ML Soln nebulizer solution  Commonly known as: DUONEB     ondansetron 8 MG Tbdp

## 2023-06-07 NOTE — ED NOTES
Patient received discharge instructions. Left ED in stable condition. No acute distress noted, no new complaints noted at this time.      Maria M Lugo RN  06/07/23 0387

## 2023-08-25 ENCOUNTER — OFFICE VISIT (OUTPATIENT)
Age: 58
End: 2023-08-25

## 2023-08-25 VITALS — BODY MASS INDEX: 35.99 KG/M2 | TEMPERATURE: 97.8 F | HEIGHT: 65 IN | WEIGHT: 216 LBS

## 2023-08-25 DIAGNOSIS — M17.5 OTHER SECONDARY OSTEOARTHRITIS OF LEFT KNEE: ICD-10-CM

## 2023-08-25 DIAGNOSIS — M21.161 ACQUIRED VARUS DEFORMITY KNEE, RIGHT: ICD-10-CM

## 2023-08-25 DIAGNOSIS — M17.11 UNILATERAL PRIMARY OSTEOARTHRITIS, RIGHT KNEE: Primary | ICD-10-CM

## 2023-08-25 DIAGNOSIS — E66.9 OBESITY (BMI 35.0-39.9 WITHOUT COMORBIDITY): ICD-10-CM

## 2023-08-25 DIAGNOSIS — M25.661 DECREASED RANGE OF MOTION (ROM) OF RIGHT KNEE: ICD-10-CM

## 2023-08-25 RX ORDER — TRIAMCINOLONE ACETONIDE 40 MG/ML
40 INJECTION, SUSPENSION INTRA-ARTICULAR; INTRAMUSCULAR ONCE
Status: SHIPPED | OUTPATIENT
Start: 2023-08-25
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
## 2023-09-18 ENCOUNTER — OFFICE VISIT (OUTPATIENT)
Age: 58
End: 2023-09-18
Payer: MEDICARE

## 2023-09-18 VITALS — TEMPERATURE: 97.7 F | BODY MASS INDEX: 35.49 KG/M2 | WEIGHT: 213 LBS | HEIGHT: 65 IN

## 2023-09-18 DIAGNOSIS — M25.661 DECREASED RANGE OF MOTION (ROM) OF RIGHT KNEE: ICD-10-CM

## 2023-09-18 DIAGNOSIS — M17.11 UNILATERAL PRIMARY OSTEOARTHRITIS, RIGHT KNEE: Primary | ICD-10-CM

## 2023-09-18 DIAGNOSIS — M21.161 ACQUIRED VARUS DEFORMITY KNEE, RIGHT: ICD-10-CM

## 2023-09-18 PROCEDURE — G8428 CUR MEDS NOT DOCUMENT: HCPCS | Performed by: PHYSICIAN ASSISTANT

## 2023-09-18 PROCEDURE — G8417 CALC BMI ABV UP PARAM F/U: HCPCS | Performed by: PHYSICIAN ASSISTANT

## 2023-09-18 PROCEDURE — 1036F TOBACCO NON-USER: CPT | Performed by: PHYSICIAN ASSISTANT

## 2023-09-18 PROCEDURE — 99213 OFFICE O/P EST LOW 20 MIN: CPT | Performed by: PHYSICIAN ASSISTANT

## 2023-09-18 PROCEDURE — 3017F COLORECTAL CA SCREEN DOC REV: CPT | Performed by: PHYSICIAN ASSISTANT

## 2023-09-18 NOTE — PROGRESS NOTES
DVT right lower extremity. IMAGING:  Imaging read by myself and interpreted as follows:  Historical tricompartmental osteoarthritis of the right knee greatest in the medial joint space and patellofemoral    IMPRESSION:      ICD-10-CM    1. Unilateral primary osteoarthritis, right knee  M17.11 DME Order for (Specify) as OP      2. Acquired varus deformity knee, right  M21.161       3. Decreased range of motion (ROM) of right knee  M25.661            PLAN: Today we discussed alternatives care to include but not limited to a formal referral to Dr. Eduardo Ohara if patient fails to improve with current recommended hyaluronic acid therapies. In addition patient may benefit short-term while walking with a rebound sport hinged neoprene sleeve. Patient is not amenable at this time to consideration for a total knee replacement. Today all questions answered to her satisfaction. Care plan outlined and precautions discussed. Results were reviewed with the patient. All medications were reviewed with the patient. All of pt's questions and concerns were addressed. Alarm symptoms and return precautions associated with chief complaint and evaluation were reviewed with the patient in detail. The patient demonstrated adequate understanding. The patient expresses willing compliance with the treatment plan. Special note: Medication management discussed in detail all patient's questions answered to their satisfaction. Patient provided a reminder for a \"due or due soon\" health maintenance. I have asked the patient to schedule an appointment with their primary care provider for follow-up on general health maintenance concerns. Today all the patient's questions were answered to their satisfaction. Copies of x-rays reviewed if obtained this visit, and provided to patient. Dictation disclaimer:  Please note that this dictation was completed with \"Dragon\", the computer voice recognition software.  Today's exam

## 2024-01-21 ENCOUNTER — HOSPITAL ENCOUNTER (EMERGENCY)
Facility: HOSPITAL | Age: 59
Discharge: HOME | End: 2024-01-21
Attending: EMERGENCY MEDICINE
Payer: MEDICARE

## 2024-01-21 VITALS
TEMPERATURE: 98 F | DIASTOLIC BLOOD PRESSURE: 70 MMHG | OXYGEN SATURATION: 98 % | RESPIRATION RATE: 20 BRPM | SYSTOLIC BLOOD PRESSURE: 121 MMHG | HEART RATE: 100 BPM | BODY MASS INDEX: 30.56 KG/M2 | HEIGHT: 64 IN | WEIGHT: 179 LBS

## 2024-01-21 DIAGNOSIS — J06.9 VIRAL UPPER RESPIRATORY TRACT INFECTION: ICD-10-CM

## 2024-01-21 DIAGNOSIS — H10.33 ACUTE CONJUNCTIVITIS OF BOTH EYES, UNSPECIFIED ACUTE CONJUNCTIVITIS TYPE: Primary | ICD-10-CM

## 2024-01-21 PROCEDURE — 99283 EMERGENCY DEPT VISIT LOW MDM: CPT

## 2024-01-21 RX ORDER — ERYTHROMYCIN 5 MG/G
OINTMENT OPHTHALMIC
Qty: 3.5 G | Refills: 0 | Status: SHIPPED | OUTPATIENT
Start: 2024-01-21 | End: 2024-01-21

## 2024-01-21 RX ORDER — ERYTHROMYCIN 5 MG/G
OINTMENT OPHTHALMIC
Qty: 3.5 G | Refills: 0 | Status: SHIPPED | OUTPATIENT
Start: 2024-01-21 | End: 2024-01-31

## 2024-01-21 ASSESSMENT — PAIN SCALES - GENERAL: PAINLEVEL_OUTOF10: 7

## 2024-01-21 ASSESSMENT — PAIN - FUNCTIONAL ASSESSMENT: PAIN_FUNCTIONAL_ASSESSMENT: 0-10

## 2024-01-21 NOTE — ED PROVIDER NOTES
acetaminophen (TYLENOL) 500 MG tablet Take 1 tablet by mouth 4 times daily as needed for Pain, Disp-30 tablet, R-0Normal      albuterol sulfate HFA (PROVENTIL;VENTOLIN;PROAIR) 108 (90 Base) MCG/ACT inhaler Inhale 1-2 puffs into the lungs every 4 hours as neededHistorical Med      benzonatate (TESSALON) 100 MG capsule ceived the following from [facility]: Outside name: benzonatate (TESSALON) 100 mg capsuleHistorical Med      EPINEPHrine (EPIPEN) 0.3 MG/0.3ML SOAJ injection Inject 0.3 mLs into the muscle once as neededHistorical Med      ergocalciferol (ERGOCALCIFEROL) 1.25 MG (40785 UT) capsule ceived the following from [facility]: Outside name: ergocalciferol (ERGOCALCIFEROL) 50,000 unit capsuleHistorical Med      fluticasone-salmeterol (ADVAIR DISKUS) 250-50 MCG/ACT AEPB diskus inhaler Inhale 1 puff into the lungs in the morning and 1 puff in the evening.Historical Med      hydroxychloroquine (PLAQUENIL) 200 MG tablet 1 tablet dailyHistorical Med      ibuprofen (ADVIL;MOTRIN) 800 MG tablet TAKE 1 TABLET BY MOUTH EVERY 8 HOURS AS NEEDED FOR PAINHistorical Med      ipratropium-albuterol (DUONEB) 0.5-2.5 (3) MG/3ML SOLN nebulizer solution ceived the following from [facility]: Outside name: albuterol-ipratropium (DUO-NEB) 2.5 mg-0.5 mg/3 ml nebuHistorical Med      ondansetron (ZOFRAN-ODT) 8 MG TBDP disintegrating tablet Take 1 tablet by mouth every 8 hours as neededHistorical Med      pantoprazole (PROTONIX) 40 MG tablet Take 1 tablet by mouth every morning (before breakfast)Historical Med      potassium chloride (KLOR-CON M) 20 MEQ extended release tablet Take by mouth 2 times dailyHistorical Med      promethazine (PHENERGAN) 25 MG tablet Take 1 tablet by mouth every 6 hours as neededHistorical Med             ALLERGIES     Iron, Pomegranate (punica granatum), Sulfa antibiotics, Iron sucrose, and Penicillins    FAMILY HISTORY       Family History   Problem Relation

## 2024-01-21 NOTE — DISCHARGE INSTRUCTIONS
As discussed, use the erythromycin ointment, 1/4 inch to the lower lid of both eyes 3 times daily.  Follow-up with your primary care provider in 1 week if not improved.  Return to the emergency department for progressive eye discomfort, sensitivity to light, change in vision, or if concerned

## 2024-01-22 ENCOUNTER — HOSPITAL ENCOUNTER (EMERGENCY)
Facility: HOSPITAL | Age: 59
Discharge: HOME | End: 2024-01-22
Attending: EMERGENCY MEDICINE
Payer: MEDICARE

## 2024-01-22 ENCOUNTER — APPOINTMENT (OUTPATIENT)
Facility: HOSPITAL | Age: 59
End: 2024-01-22
Payer: MEDICARE

## 2024-01-22 VITALS
SYSTOLIC BLOOD PRESSURE: 146 MMHG | DIASTOLIC BLOOD PRESSURE: 81 MMHG | OXYGEN SATURATION: 97 % | TEMPERATURE: 99.4 F | RESPIRATION RATE: 14 BRPM | HEART RATE: 99 BPM

## 2024-01-22 DIAGNOSIS — E87.6 HYPOKALEMIA: ICD-10-CM

## 2024-01-22 DIAGNOSIS — H10.9 CONJUNCTIVITIS OF BOTH EYES, UNSPECIFIED CONJUNCTIVITIS TYPE: ICD-10-CM

## 2024-01-22 DIAGNOSIS — B34.9 VIRAL SYNDROME: Primary | ICD-10-CM

## 2024-01-22 LAB
ANION GAP SERPL CALC-SCNC: 4 MMOL/L (ref 3–18)
BASOPHILS # BLD: 0 K/UL (ref 0–0.1)
BASOPHILS NFR BLD: 0 % (ref 0–2)
BUN SERPL-MCNC: 7 MG/DL (ref 7–18)
BUN/CREAT SERPL: 10 (ref 12–20)
CALCIUM SERPL-MCNC: 8.1 MG/DL (ref 8.5–10.1)
CHLORIDE SERPL-SCNC: 110 MMOL/L (ref 100–111)
CO2 SERPL-SCNC: 27 MMOL/L (ref 21–32)
CREAT SERPL-MCNC: 0.72 MG/DL (ref 0.6–1.3)
DIFFERENTIAL METHOD BLD: NORMAL
EOSINOPHIL # BLD: 0.2 K/UL (ref 0–0.4)
EOSINOPHIL NFR BLD: 2 % (ref 0–5)
ERYTHROCYTE [DISTWIDTH] IN BLOOD BY AUTOMATED COUNT: 13.7 % (ref 11.6–14.5)
FLUAV AG NPH QL IA: NEGATIVE
FLUBV AG NOSE QL IA: NEGATIVE
GLUCOSE SERPL-MCNC: 92 MG/DL (ref 74–99)
HCT VFR BLD AUTO: 37.4 % (ref 35–45)
HGB BLD-MCNC: 12.6 G/DL (ref 12–16)
IMM GRANULOCYTES # BLD AUTO: 0 K/UL (ref 0–0.04)
IMM GRANULOCYTES NFR BLD AUTO: 0 % (ref 0–0.5)
LACTATE BLD-SCNC: 0.57 MMOL/L (ref 0.4–2)
LYMPHOCYTES # BLD: 2.8 K/UL (ref 0.9–3.6)
LYMPHOCYTES NFR BLD: 30 % (ref 21–52)
MCH RBC QN AUTO: 26.9 PG (ref 24–34)
MCHC RBC AUTO-ENTMCNC: 33.7 G/DL (ref 31–37)
MCV RBC AUTO: 79.9 FL (ref 78–100)
MONOCYTES # BLD: 0.9 K/UL (ref 0.05–1.2)
MONOCYTES NFR BLD: 9 % (ref 3–10)
NEUTS SEG # BLD: 5.6 K/UL (ref 1.8–8)
NEUTS SEG NFR BLD: 59 % (ref 40–73)
NRBC # BLD: 0 K/UL (ref 0–0.01)
NRBC BLD-RTO: 0 PER 100 WBC
PLATELET # BLD AUTO: 259 K/UL (ref 135–420)
PMV BLD AUTO: 10.7 FL (ref 9.2–11.8)
POTASSIUM SERPL-SCNC: 3.2 MMOL/L (ref 3.5–5.5)
RBC # BLD AUTO: 4.68 M/UL (ref 4.2–5.3)
SARS-COV-2 RDRP RESP QL NAA+PROBE: NOT DETECTED
SODIUM SERPL-SCNC: 141 MMOL/L (ref 136–145)
SOURCE: NORMAL
WBC # BLD AUTO: 9.4 K/UL (ref 4.6–13.2)

## 2024-01-22 PROCEDURE — 2580000003 HC RX 258: Performed by: EMERGENCY MEDICINE

## 2024-01-22 PROCEDURE — 71046 X-RAY EXAM CHEST 2 VIEWS: CPT

## 2024-01-22 PROCEDURE — 80048 BASIC METABOLIC PNL TOTAL CA: CPT

## 2024-01-22 PROCEDURE — 85025 COMPLETE CBC W/AUTO DIFF WBC: CPT

## 2024-01-22 PROCEDURE — 96360 HYDRATION IV INFUSION INIT: CPT

## 2024-01-22 PROCEDURE — 99284 EMERGENCY DEPT VISIT MOD MDM: CPT

## 2024-01-22 PROCEDURE — 87635 SARS-COV-2 COVID-19 AMP PRB: CPT

## 2024-01-22 PROCEDURE — 96361 HYDRATE IV INFUSION ADD-ON: CPT

## 2024-01-22 PROCEDURE — 87804 INFLUENZA ASSAY W/OPTIC: CPT

## 2024-01-22 PROCEDURE — 6370000000 HC RX 637 (ALT 250 FOR IP): Performed by: PHYSICIAN ASSISTANT

## 2024-01-22 PROCEDURE — 83605 ASSAY OF LACTIC ACID: CPT

## 2024-01-22 PROCEDURE — 6370000000 HC RX 637 (ALT 250 FOR IP): Performed by: EMERGENCY MEDICINE

## 2024-01-22 RX ORDER — POTASSIUM CHLORIDE 20 MEQ/1
40 TABLET, EXTENDED RELEASE ORAL
Status: COMPLETED | OUTPATIENT
Start: 2024-01-22 | End: 2024-01-22

## 2024-01-22 RX ORDER — ACETAMINOPHEN 500 MG
1000 TABLET ORAL
Status: COMPLETED | OUTPATIENT
Start: 2024-01-22 | End: 2024-01-22

## 2024-01-22 RX ORDER — POTASSIUM CHLORIDE 20 MEQ/1
40 TABLET, EXTENDED RELEASE ORAL 2 TIMES DAILY
Qty: 12 TABLET | Refills: 0 | Status: SHIPPED | OUTPATIENT
Start: 2024-01-22

## 2024-01-22 RX ORDER — 0.9 % SODIUM CHLORIDE 0.9 %
1000 INTRAVENOUS SOLUTION INTRAVENOUS ONCE
Status: COMPLETED | OUTPATIENT
Start: 2024-01-22 | End: 2024-01-22

## 2024-01-22 RX ADMIN — ACETAMINOPHEN 1000 MG: 500 TABLET ORAL at 18:37

## 2024-01-22 RX ADMIN — POTASSIUM CHLORIDE 40 MEQ: 1500 TABLET, EXTENDED RELEASE ORAL at 20:03

## 2024-01-22 RX ADMIN — SODIUM CHLORIDE 1000 ML: 9 INJECTION, SOLUTION INTRAVENOUS at 18:22

## 2024-01-22 ASSESSMENT — PAIN - FUNCTIONAL ASSESSMENT: PAIN_FUNCTIONAL_ASSESSMENT: NONE - DENIES PAIN

## 2024-01-22 ASSESSMENT — LIFESTYLE VARIABLES
HOW OFTEN DO YOU HAVE A DRINK CONTAINING ALCOHOL: NEVER
HOW MANY STANDARD DRINKS CONTAINING ALCOHOL DO YOU HAVE ON A TYPICAL DAY: PATIENT DOES NOT DRINK

## 2024-01-22 NOTE — ED NOTES
6:44 PM :Pt care assumed from Dr. Falcon  , ED provider. Pt complaint(s), current treatment plan, progression and available diagnostic results have been discussed thoroughly. The patient was seen and evaluated on my shift.   Rounding occurred: no  Intended Disposition: home  Pending diagnostic reports and/or labs (please list): CBC, BMP, COVID/flu, repeat HR      7:58 PM  CBC: WNL  BMP with K 3.2  COVID and influenza negative.    Repeat HR 99    Reviewed results and plan with patient and . Discussed need for close outpatient follow-up with PMD this week for reassessment. Discussed strict return precautions, including fever, shortness of breath, or any other medical concerns.  Pt in agreement with plan, ready to go home.      Charmaine Thakkar PA  01/22/24 2000

## 2024-01-22 NOTE — ED PROVIDER NOTES
1/22/24 2000)   acetaminophen (TYLENOL) tablet 1,000 mg (1,000 mg Oral Given 1/22/24 1837)   potassium chloride (KLOR-CON M) extended release tablet 40 mEq (40 mEq Oral Given 1/22/24 2003)            The patient tolerated their visit well.   Thepatient and / or the family were informed of the results of any tests, a time was given to answer questions.    I am the Primary Clinician of Record.    FINAL IMPRESSION      1. Viral syndrome    2. Conjunctivitis of both eyes, unspecified conjunctivitis type    3. Hypokalemia        DISPOSITION/PLAN   DISPOSITION Decision To Discharge 01/22/2024 07:54:24 PM      PATIENT REFERRED TO:  Keisha rPatt,   [address] [place] [address] [phone]    Schedule an appointment as soon as possible for a visit in 3 days  As needed    [facility] EMERGENCY DEPT  [address] [place] [id number] [phone]  Schedule an appointment as soon as possible for a visit         DISCHARGE MEDICATIONS:  Discharge Medication List as of 1/22/2024  8:00 PM          DISCONTINUED MEDICATIONS:  Discharge Medication List as of 1/22/2024  8:00 PM                 (Please note that portions of this note were completed with a voice recognition program.  Efforts were made to edit the dictations but occasionally words aremis-transcribed.)    Otto Falcon MD (electronically signed)          Otto Falcon MD  01/23/24 0803

## 2024-01-22 NOTE — ED NOTES
2mg morphine returned with Ferdinand OBRIEN; patient preferred 4mg dose.  (Accidentally indicated dose dropped in omnicell in error.  Unable to fix)

## 2024-01-22 NOTE — DISCHARGE INSTRUCTIONS
You may use over-the-counter medications as desired.  Please ensure adequate intake of fluids by mouth.  Continue the erythromycin ointment for comfort.  Follow-up with primary care at the end of the week if still feeling poorly.  Return to the emergency department for any acute concern

## 2024-03-11 ENCOUNTER — OFFICE VISIT (OUTPATIENT)
Age: 59
End: 2024-03-11
Payer: MEDICARE

## 2024-03-11 VITALS — WEIGHT: 21 LBS | BODY MASS INDEX: 3.3 KG/M2 | TEMPERATURE: 96.9 F | HEIGHT: 67 IN

## 2024-03-11 DIAGNOSIS — M17.11 UNILATERAL PRIMARY OSTEOARTHRITIS, RIGHT KNEE: Primary | ICD-10-CM

## 2024-03-11 PROCEDURE — 99213 OFFICE O/P EST LOW 20 MIN: CPT | Performed by: PHYSICIAN ASSISTANT

## 2024-03-11 PROCEDURE — G8418 CALC BMI BLW LOW PARAM F/U: HCPCS | Performed by: PHYSICIAN ASSISTANT

## 2024-03-11 PROCEDURE — G8428 CUR MEDS NOT DOCUMENT: HCPCS | Performed by: PHYSICIAN ASSISTANT

## 2024-03-11 PROCEDURE — G8484 FLU IMMUNIZE NO ADMIN: HCPCS | Performed by: PHYSICIAN ASSISTANT

## 2024-03-11 PROCEDURE — 1036F TOBACCO NON-USER: CPT | Performed by: PHYSICIAN ASSISTANT

## 2024-03-11 PROCEDURE — 3017F COLORECTAL CA SCREEN DOC REV: CPT | Performed by: PHYSICIAN ASSISTANT

## 2024-03-11 PROCEDURE — 20611 DRAIN/INJ JOINT/BURSA W/US: CPT | Performed by: PHYSICIAN ASSISTANT

## 2024-03-11 RX ORDER — HYALURONATE SODIUM 10 MG/ML
20 SYRINGE (ML) INTRAARTICULAR ONCE
Status: COMPLETED | OUTPATIENT
Start: 2024-03-11 | End: 2024-03-11

## 2024-03-11 RX ADMIN — Medication 20 MG: at 11:18

## 2024-03-11 NOTE — PROGRESS NOTES
Patient assisted by: self    How tolerated by patient: tolerated the procedure well with no complications    Comments: none    The patient is asked to continue to rest the area for a few more days before resuming regular activities.  It may be more painful for the first 1-2 days.  Watch for fever, or increased swelling or persistent pain in the joint. Call or return to clinic prn if such symptoms occur or there is failure to improve as anticipated.      Post injection instructions were given to patient: Remove the band aid in 3 hours, Wash site with clean soap, No further dressings there after. Call Tenzin Brownlee PA-C [phone] if any: pain, redness, drainage, fever, or any concerns/questions  that may have regarding the injection. Patient was advised on the signs of infection and instructed to go to the ER  if it is office after hours.  Please Note:        Kuaiyong using a frequency of 10MHz with a 12L-RS transducer head was used to confirm needle placement.  Ultrasound images captured using Kuaiyong Ultrasound machine and scanned into patient's chart.          Care plan outlined and precautions discussed.  Results were reviewed with the patient. All medications were reviewed with the patient. All of pt's questions and concerns were addressed.  Alarm symptoms and return precautions associated with chief complaint and evaluation were reviewed with the patient in detail.  The patient demonstrated adequate understanding.The patient expresses willing compliance with the treatment plan.     Special note: Medication management discussed in detail all patient's questions answered to their satisfaction.        Patient provided a reminder for a \"due or due soon\" health maintenance. I have asked the patient to schedule an appointment with their primary care provider for follow-up on general health maintenance concerns.    Today all the patient's questions were answered to their satisfaction.  Copies of

## 2024-03-18 ENCOUNTER — OFFICE VISIT (OUTPATIENT)
Age: 59
End: 2024-03-18
Payer: MEDICARE

## 2024-03-18 VITALS — HEIGHT: 64 IN | WEIGHT: 213 LBS | TEMPERATURE: 97.8 F | BODY MASS INDEX: 36.37 KG/M2

## 2024-03-18 DIAGNOSIS — M17.11 UNILATERAL PRIMARY OSTEOARTHRITIS, RIGHT KNEE: Primary | ICD-10-CM

## 2024-03-18 PROCEDURE — 99024 POSTOP FOLLOW-UP VISIT: CPT | Performed by: PHYSICIAN ASSISTANT

## 2024-03-18 PROCEDURE — 20611 DRAIN/INJ JOINT/BURSA W/US: CPT | Performed by: PHYSICIAN ASSISTANT

## 2024-03-18 RX ORDER — HYALURONATE SODIUM 10 MG/ML
20 SYRINGE (ML) INTRAARTICULAR ONCE
Status: COMPLETED | OUTPATIENT
Start: 2024-03-18 | End: 2024-03-18

## 2024-03-18 RX ADMIN — Medication 20 MG: at 10:41

## 2024-03-18 NOTE — PROGRESS NOTES
range of motion while supine 95 degrees -5 degrees with a varus deformity in extension plane.    MCL LCL intact no laxity pain or giveaway    Trace laxity of the ACL no PCL laxity no pain in either plane of motion    Quad and patellar tendons intact nontender no palpable deformities or concavities noted    No calf tenderness or evidence of DVT right lower extremity.    IMAGING:  Imaging read by myself and interpreted as follows:  Historical tricompartmental osteoarthritis of the right knee greatest in the medial joint space and patellofemoral    IMPRESSION:      ICD-10-CM    1. Unilateral primary osteoarthritis, right knee  M17.11 sodium hyaluronate (EUFLEXXA, HYALGAN) injection 20 mg     AMB POC US DRAIN/INJECT LARGE JOINT/BURSA           PLAN: Today we discussed alternatives care to include but not limited to initiation of hyaluronic acid in the form of Euflexxa.  Patient agreed with plan of care.  Continuation of care necessary for patient to follow in 1 week.    Procedure: Using sterile technique and verbal and written consent were obtained appropriate timeout formed patient laying supine right knee flexed at 20 degrees on a bolster 2 cc of Euflexxa injected using the superior lateral interarticular approach.  There were no complications.  Patient tolerated the procedure well.    Chart reviewed for the following:   Tenzin VENEGAS PA-C, have reviewed the History, Physical and updated the Allergic reactions for Ally Gamez    TIME OUT performed immediately prior to start of procedure:   Tenzin VENEGAS PA-C, have performed the following reviews on Ally Gamez prior to the start of the procedure:            * Patient was identified by name and date of birth   * Agreement on procedure being performed was verified  * Risks and Benefits explained to the patient  * Procedure site verified and marked as necessary  * Patient was positioned for comfort  * Consent was signed and verified             Date

## 2024-03-25 ENCOUNTER — OFFICE VISIT (OUTPATIENT)
Age: 59
End: 2024-03-25
Payer: MEDICARE

## 2024-03-25 VITALS — HEIGHT: 64 IN | WEIGHT: 213 LBS | TEMPERATURE: 96.7 F | BODY MASS INDEX: 36.37 KG/M2

## 2024-03-25 DIAGNOSIS — M17.11 UNILATERAL PRIMARY OSTEOARTHRITIS, RIGHT KNEE: Primary | ICD-10-CM

## 2024-03-25 PROCEDURE — 90000 NO LOS: CPT | Performed by: PHYSICIAN ASSISTANT

## 2024-03-25 PROCEDURE — 20611 DRAIN/INJ JOINT/BURSA W/US: CPT | Performed by: PHYSICIAN ASSISTANT

## 2024-03-25 RX ORDER — HYALURONATE SODIUM 10 MG/ML
20 SYRINGE (ML) INTRAARTICULAR ONCE
Status: COMPLETED | OUTPATIENT
Start: 2024-03-25 | End: 2024-03-25

## 2024-03-25 RX ADMIN — Medication 20 MG: at 10:48

## 2024-03-25 NOTE — PROGRESS NOTES
Patient: Ally Gamez                MRN: [id number]       SSN: [id number]  YOB: 1965        AGE: 58 y.o.        SEX: female      PCP: Keisha Pratt,   03/25/24      3/25/2024: Patient returns the office for completion of Euflexxa to the right knee.  She is done well from her prior to injections as well as completing the left knee prior.      3/18/2024: Patient returns to the office for continuation of viscosupplementation in form of Euflexxa for her right knee.    3/11/2024: Patient has been followed for chronic right knee pain and is here today to begin viscosupplementation therapies up.    9/18/2023: Patient returns the office for a recurrence of swelling to the right knee.  She was last seen for similar on 8/25/2023 and at that time underwent a formal aspiration with following cortisone injection.  She has underlying tricompartmental osteoarthritis of the right knee.  She is pending for hyaluronic acid treatment.    No chief complaint on file.      HISTORY:    Ally Gamez is a 58 y.o. female presents to the office with an acute on chronic episode of right knee pain.    Pain limits her ability to stand for short periods of time and walk short distances.    Pain is dull aching characteristic with occasional sharp stabbing sensations primarily over the inner aspect of the right knee.  She has documented radiographic evidence of tricompartmental osteoarthritis with near complete collapse of the medial joint space.    She has benefited with low-dose cortisone as well as hyaluronic acid therapies to the right knee previous.    She is requesting a right knee hyaluronic acid course repeat and today if possible a low-dose cortisone injection.        No results found for: \"HBA1C\", \"[id number]\"      3/18/2024    10:33 AM 3/11/2024    10:56 AM 1/21/2024     8:58 AM 9/18/2023    10:00 AM 8/25/2023     8:38 AM 6/7/2023     3:48 PM 1/30/2023     9:49 AM   Weight Metrics   Weight 213 lb

## 2025-03-08 ENCOUNTER — HOSPITAL ENCOUNTER (EMERGENCY)
Facility: HOSPITAL | Age: 60
Discharge: HOME | End: 2025-03-08
Attending: EMERGENCY MEDICINE
Payer: MEDICARE

## 2025-03-08 VITALS
DIASTOLIC BLOOD PRESSURE: 108 MMHG | SYSTOLIC BLOOD PRESSURE: 163 MMHG | HEIGHT: 64 IN | WEIGHT: 190 LBS | RESPIRATION RATE: 18 BRPM | BODY MASS INDEX: 32.44 KG/M2 | HEART RATE: 103 BPM | TEMPERATURE: 98 F | OXYGEN SATURATION: 98 %

## 2025-03-08 DIAGNOSIS — M32.9 SYSTEMIC LUPUS ERYTHEMATOSUS, UNSPECIFIED SLE TYPE, UNSPECIFIED ORGAN INVOLVEMENT STATUS (HCC): ICD-10-CM

## 2025-03-08 DIAGNOSIS — J40 COMPLICATED BRONCHITIS: Primary | ICD-10-CM

## 2025-03-08 PROCEDURE — 99283 EMERGENCY DEPT VISIT LOW MDM: CPT

## 2025-03-08 RX ORDER — DOXYCYCLINE HYCLATE 100 MG
100 TABLET ORAL 2 TIMES DAILY
Qty: 14 TABLET | Refills: 0 | Status: SHIPPED | OUTPATIENT
Start: 2025-03-08 | End: 2025-03-15

## 2025-03-08 RX ORDER — PREDNISONE 50 MG/1
50 TABLET ORAL DAILY
Qty: 5 TABLET | Refills: 0 | Status: SHIPPED | OUTPATIENT
Start: 2025-03-08 | End: 2025-03-13

## 2025-03-08 ASSESSMENT — PAIN - FUNCTIONAL ASSESSMENT: PAIN_FUNCTIONAL_ASSESSMENT: NONE - DENIES PAIN

## 2025-03-08 NOTE — ED TRIAGE NOTES
Asthma exacerbation, increased use of rescue inhaler.   Cough with sputum  S/s began Sunday, worsening

## 2025-03-08 NOTE — ED PROVIDER NOTES
EMERGENCY DEPARTMENT HISTORY AND PHYSICAL EXAM    8:22 AM EST seen at this time in room 2        Date: 3/8/2025  Patient Name: Ally Gamez    History of Presenting Illness     Chief Complaint   Patient presents with    Asthma    Cough         History Provided By: patient    Additional History (Context): Ally Gamez is a 59 y.o. female presents with history of cutaneous lupus and asthma, has asthma exacerbation for little bit over a week, cough and shortness of breath particularly when she is trying to sleep needing her nebulizer a lot.  Has had some chills and noticed a sputum change from clear to green.  Chest discomfort only with coughing.  No vomiting or diarrhea no abdominal pain or sore throat.    PCP: Keisha Denny DO    Chief Complaint:   Duration:    Timing:    Location:   Quality:   Severity:   Modifying Factors:   Associated Symptoms:       Current Facility-Administered Medications   Medication Dose Route Frequency Provider Last Rate Last Admin    triamcinolone acetonide (KENALOG-40) injection 40 mg  40 mg Intra-artICUlar Once Tenzin Brownlee PA-C         Current Outpatient Medications   Medication Sig Dispense Refill    predniSONE (DELTASONE) 50 MG tablet Take 1 tablet by mouth daily for 5 days 5 tablet 0    doxycycline hyclate (VIBRA-TABS) 100 MG tablet Take 1 tablet by mouth 2 times daily for 7 days 14 tablet 0    potassium chloride (KLOR-CON M) 20 MEQ extended release tablet Take 2 tablets by mouth 2 times daily 12 tablet 0    albuterol sulfate HFA (PROVENTIL;VENTOLIN;PROAIR) 108 (90 Base) MCG/ACT inhaler Inhale 1-2 puffs into the lungs every 4 hours as needed      EPINEPHrine (EPIPEN) 0.3 MG/0.3ML SOAJ injection Inject 0.3 mLs into the muscle once as needed      ergocalciferol (ERGOCALCIFEROL) 1.25 MG (89992 UT) capsule ceived the following from [facility]: Outside name: ergocalciferol (ERGOCALCIFEROL) 50,000 unit capsule      fluticasone-salmeterol (ADVAIR  (ERGOCALCIFEROL) 1.25 MG (15340 UT) capsule ceived the following from [facility]: Outside name: ergocalciferol (ERGOCALCIFEROL) 50,000 unit capsule      fluticasone-salmeterol (ADVAIR DISKUS) 250-50 MCG/ACT AEPB diskus inhaler Inhale 1 puff into the lungs in the morning and 1 puff in the evening.      hydroxychloroquine (PLAQUENIL) 200 MG tablet 1 tablet daily      ibuprofen (ADVIL;MOTRIN) 800 MG tablet TAKE 1 TABLET BY MOUTH EVERY 8 HOURS AS NEEDED FOR PAIN      ipratropium-albuterol (DUONEB) 0.5-2.5 (3) MG/3ML SOLN nebulizer solution ceived the following from [facility]: Outside name: albuterol-ipratropium (DUO-NEB) 2.5 mg-0.5 mg/3 ml nebu      pantoprazole (PROTONIX) 40 MG tablet Take 1 tablet by mouth every morning (before breakfast)      promethazine (PHENERGAN) 25 MG tablet Take 1 tablet by mouth every 6 hours as needed      acetaminophen (TYLENOL) 500 MG tablet Take 1 tablet by mouth 4 times daily as needed for Pain  Qty: 30 tablet, Refills: 0      benzonatate (TESSALON) 100 MG capsule ceived the following from [facility]: Outside name: benzonatate (TESSALON) 100 mg capsule      ondansetron (ZOFRAN-ODT) 8 MG TBDP disintegrating tablet Take 1 tablet by mouth every 8 hours as needed             DISCONTINUED MEDICATIONS:  Current Discharge Medication List          PATIENT REFERRED TO:  Follow Up with:  Keisha Denny DO  [address] [place] [address] [phone]    In 1 week      _______________________________    Clinical Impression:   1. Complicated bronchitis    2. Systemic lupus erythematosus, unspecified SLE type, unspecified organ involvement status (HCC)         Jerzy Brown MD using Dragon dictation      _______________________________     Jerzy Brown MD  03/08/25 [id number]
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
## 2025-03-28 ENCOUNTER — OFFICE VISIT (OUTPATIENT)
Age: 60
End: 2025-03-28

## 2025-03-28 VITALS — TEMPERATURE: 97.1 F | BODY MASS INDEX: 34.49 KG/M2 | HEIGHT: 65 IN | WEIGHT: 207 LBS

## 2025-03-28 DIAGNOSIS — M79.641 RIGHT HAND PAIN: Primary | ICD-10-CM

## 2025-03-28 DIAGNOSIS — M19.049 OSTEOARTHRITIS OF METACARPOPHALANGEAL (MCP) JOINT: ICD-10-CM

## 2025-03-28 DIAGNOSIS — M18.11 PRIMARY OSTEOARTHRITIS OF FIRST CARPOMETACARPAL JOINT OF RIGHT HAND: ICD-10-CM

## 2025-03-28 RX ORDER — BETAMETHASONE SODIUM PHOSPHATE AND BETAMETHASONE ACETATE 3; 3 MG/ML; MG/ML
3 INJECTION, SUSPENSION INTRA-ARTICULAR; INTRALESIONAL; INTRAMUSCULAR; SOFT TISSUE ONCE
Status: COMPLETED | OUTPATIENT
Start: 2025-03-28 | End: 2025-03-28

## 2025-03-28 RX ORDER — BUPIVACAINE HYDROCHLORIDE 5 MG/ML
0.5 INJECTION, SOLUTION PERINEURAL ONCE
Status: COMPLETED | OUTPATIENT
Start: 2025-03-28 | End: 2025-03-28

## 2025-03-28 RX ADMIN — BETAMETHASONE SODIUM PHOSPHATE AND BETAMETHASONE ACETATE 3 MG: 3; 3 INJECTION, SUSPENSION INTRA-ARTICULAR; INTRALESIONAL; INTRAMUSCULAR; SOFT TISSUE at 08:49

## 2025-03-28 RX ADMIN — BUPIVACAINE HYDROCHLORIDE 2.5 MG: 5 INJECTION, SOLUTION PERINEURAL at 08:50

## 2025-05-14 ENCOUNTER — HOSPITAL ENCOUNTER (EMERGENCY)
Age: 60
Discharge: HOME | End: 2025-05-14
Attending: EMERGENCY MEDICINE
Payer: MEDICARE

## 2025-05-14 VITALS
TEMPERATURE: 98.3 F | RESPIRATION RATE: 16 BRPM | DIASTOLIC BLOOD PRESSURE: 89 MMHG | BODY MASS INDEX: 32.27 KG/M2 | OXYGEN SATURATION: 99 % | HEIGHT: 64 IN | WEIGHT: 189 LBS | HEART RATE: 92 BPM | SYSTOLIC BLOOD PRESSURE: 150 MMHG

## 2025-05-14 DIAGNOSIS — J30.9 ALLERGIC RHINITIS, UNSPECIFIED SEASONALITY, UNSPECIFIED TRIGGER: Primary | ICD-10-CM

## 2025-05-14 DIAGNOSIS — H10.33 ACUTE CONJUNCTIVITIS OF BOTH EYES, UNSPECIFIED ACUTE CONJUNCTIVITIS TYPE: ICD-10-CM

## 2025-05-14 PROCEDURE — 6370000000 HC RX 637 (ALT 250 FOR IP): Performed by: EMERGENCY MEDICINE

## 2025-05-14 PROCEDURE — 99283 EMERGENCY DEPT VISIT LOW MDM: CPT

## 2025-05-14 PROCEDURE — 2500000003 HC RX 250 WO HCPCS: Performed by: EMERGENCY MEDICINE

## 2025-05-14 RX ORDER — CIPROFLOXACIN HYDROCHLORIDE 3.5 MG/ML
1 SOLUTION/ DROPS TOPICAL 3 TIMES DAILY
Qty: 2.5 ML | Refills: 0 | Status: SHIPPED | OUTPATIENT
Start: 2025-05-14 | End: 2025-05-21

## 2025-05-14 RX ORDER — PROPARACAINE HYDROCHLORIDE 5 MG/ML
1 SOLUTION/ DROPS OPHTHALMIC
Status: COMPLETED | OUTPATIENT
Start: 2025-05-14 | End: 2025-05-14

## 2025-05-14 RX ORDER — METHYLPREDNISOLONE 4 MG/1
TABLET ORAL
Qty: 1 KIT | Refills: 0 | Status: SHIPPED | OUTPATIENT
Start: 2025-05-15 | End: 2025-05-21

## 2025-05-14 RX ORDER — PREDNISONE 20 MG/1
60 TABLET ORAL
Status: COMPLETED | OUTPATIENT
Start: 2025-05-14 | End: 2025-05-14

## 2025-05-14 RX ADMIN — PROPARACAINE HYDROCHLORIDE 1 DROP: 5 SOLUTION/ DROPS OPHTHALMIC at 11:00

## 2025-05-14 RX ADMIN — PREDNISONE 60 MG: 20 TABLET ORAL at 10:57

## 2025-05-14 RX ADMIN — FLUORESCEIN SODIUM 1 MG: 1 STRIP OPHTHALMIC at 11:00

## 2025-05-14 ASSESSMENT — ENCOUNTER SYMPTOMS
ABDOMINAL PAIN: 0
RHINORRHEA: 1
CHEST TIGHTNESS: 0
EYE REDNESS: 1
EYE ITCHING: 1

## 2025-05-14 ASSESSMENT — PAIN - FUNCTIONAL ASSESSMENT: PAIN_FUNCTIONAL_ASSESSMENT: 0-10

## 2025-05-14 ASSESSMENT — PAIN SCALES - GENERAL: PAINLEVEL_OUTOF10: 5
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
## 2025-05-14 NOTE — ED TRIAGE NOTES
Ambulatory to QA with c/o B/L eye pain, redness and nasal congestion x 5 days. VA with glasses = 20/25 OU; 20/40 OS; 20/40 OD

## 2025-05-14 NOTE — DISCHARGE INSTRUCTIONS
Start taking your steroid pack tomorrow as I gave you your first dose in the ER, start using the eyedrops as soon as you pick them up.  Follow closely with your primary care provider and return if you are at all worsened or concerned.

## 2025-08-01 ENCOUNTER — OFFICE VISIT (OUTPATIENT)
Age: 60
End: 2025-08-01

## 2025-08-01 VITALS — BODY MASS INDEX: 35.34 KG/M2 | HEIGHT: 64 IN | WEIGHT: 207 LBS

## 2025-08-01 DIAGNOSIS — M17.0 ARTHRITIS OF BOTH KNEES: ICD-10-CM
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
## 2025-08-01 NOTE — PROGRESS NOTES
Patient returns the office with questions based on a recent fall.  She was climbing stairs when she missed a step falling directly on her knees.  Since the fall several weeks ago she developed anterior painless swelling to the lower portion of each knee.  When she flexes her knee sitting she notices a area of elevation in the area where she had knee contusions.    She has no pain in the knees today.  She does have underlying tricompartmental osteoarthritis of both knees previously treated with hyaluronic acid as well as cortisone/aspiration.    On exam of both knees there is a small hardened area of raised tissue over the inferior medial pole of both patellas.  No crepitus noted.    Active range of motion of both knees 105 degrees -5 degrees with crepitation no instability or pain.    Plan: Patient may have developed a bony osteophyte in the area from the contusion to the bone.  No need for cortisone injection or aspiration today of either knee.    Patient to follow-up as needed.  No x-rays recommended today.

## (undated) DEVICE — AIRLIFE™ NASAL OXYGEN CANNULA CURVED, FLARED TIP WITH 14 FOOT (4.3 M) CRUSH-RESISTANT TUBING, OVER-THE-EAR STYLE: Brand: AIRLIFE™

## (undated) DEVICE — DEVICE LCK BILI RAP EXCHG OLPS --

## (undated) DEVICE — RETRIEVAL BALLOON CATHETER: Brand: EXTRACTOR™ PRO RX

## (undated) DEVICE — 2, DISPOSABLE SUCTION/IRRIGATOR WITH DISPOSABLE TIP: Brand: STRYKEFLOW

## (undated) DEVICE — BLADELESS OPTICAL TROCAR WITH FIXATION CANNULA: Brand: VERSAPORT

## (undated) DEVICE — PACK PROCEDURE SURG LAPAROSCOPY 17X7 MM BRTRC PRIMUS

## (undated) DEVICE — ENDOLOOP SUT LIGATURE 18IN -- 12/BX PDS II

## (undated) DEVICE — MEDI-VAC SUCTION HIGH CAPACITY: Brand: CARDINAL HEALTH

## (undated) DEVICE — CATHETER SUCT TR FL TIP 14FR W/ O CTRL

## (undated) DEVICE — CATHETER IV 14GA L5.25IN PERIPH ORNG FEP POLYMER 3 BVL

## (undated) DEVICE — SYRINGE MED 20ML STD CLR PLAS LUERLOCK TIP N CTRL DISP

## (undated) DEVICE — SOLUTION LACTATED RINGERS INJECTION USP

## (undated) DEVICE — SOLUTION IRRIG 1000ML H2O STRL BLT

## (undated) DEVICE — MEDI-VAC NON-CONDUCTIVE SUCTION TUBING: Brand: CARDINAL HEALTH

## (undated) DEVICE — BITE BLOCK ENDOSCP UNIV AD 6 TO 9.4 MM

## (undated) DEVICE — BASIN EMESIS 500CC ROSE 250/CS 60/PLT: Brand: MEDEGEN MEDICAL PRODUCTS, LLC

## (undated) DEVICE — SYR 50ML SLIP TIP NSAF LF STRL --

## (undated) DEVICE — EXTENSION SET 20 IN 3 CC PINCH CLMP 2 PC M LL STRL

## (undated) DEVICE — NDL PRT INJ NSAF BLNT 18GX1.5 --

## (undated) DEVICE — BAG SPEC RETRV 275ML 10ML DISPOSABLE RELIACATCH

## (undated) DEVICE — APPLIER CLP M/L SHFT DIA5MM 15 LIG LIGAMAX 5

## (undated) DEVICE — FLEX ADVANTAGE 3000CC: Brand: FLEX ADVANTAGE

## (undated) DEVICE — FLUFF AND POLYMER UNDERPAD,EXTRA HEAVY: Brand: WINGS

## (undated) DEVICE — ESOPHAGEAL BALLOON DILATATION CATHETER: Brand: CRE FIXED WIRE

## (undated) DEVICE — 3M™ BAIR PAWS FLEX™ WARMING GOWN, STANDARD, 20 PER CASE 81003: Brand: BAIR PAWS™

## (undated) DEVICE — (D)GLOVE EXAM LG NITRL NS -- DISC BY MFR NO SUB

## (undated) DEVICE — KIT CLN UP BON SECOURS MARYV

## (undated) DEVICE — FORCEPS BX L240CM JAW DIA2.8MM L CAP W/ NDL MIC MESH TOOTH

## (undated) DEVICE — SUTURE VCRL SZ 0 L18IN ABSRB UD POLYGLACTIN 910 BRAID TIE J912G

## (undated) DEVICE — NEEDLE INSUF L120MM DIA2MM DISP FOR PNEUMOPERI ENDOPATH

## (undated) DEVICE — SYRINGE MED 25GA 3ML L5/8IN SUBQ PLAS W/ DETACH NDL SFTY

## (undated) DEVICE — DERMABOND SKIN ADH 0.7ML -- DERMABOND ADVANCED 12/BX

## (undated) DEVICE — TUBE NG 16FR L48IN 2 LUMN W/ PREVENT ANTIREFLX FLTR FOR FLD

## (undated) DEVICE — GOWN ISOL IMPERV UNIV, DISP, OPEN BACK, BLUE --

## (undated) DEVICE — (D)SYR 10ML 1/5ML GRAD NSAF -- PKGING CHANGE USE ITEM 338027

## (undated) DEVICE — GLOVE SURG SZ 7.5 L11.73IN FNGR THK9.8MIL STRW LTX POLYMER

## (undated) DEVICE — SPHINCTEROTOME: Brand: DREAMTOME™ RX 44

## (undated) DEVICE — REM POLYHESIVE ADULT PATIENT RETURN ELECTRODE: Brand: VALLEYLAB

## (undated) DEVICE — SUTURE MCRYL SZ 4-0 L27IN ABSRB UD L24MM PS-1 3/8 CIR PRIM Y935H

## (undated) DEVICE — SYR ASSEMB INFL BLLN 60ML --

## (undated) DEVICE — Device: Brand: MEDEX

## (undated) DEVICE — Device

## (undated) DEVICE — BLADELESS OPTICAL TROCAR WITH FIXATION CANNULA: Brand: VERSAONE

## (undated) DEVICE — CANNULA ORIG TL CLR W FOAM CUSHIONS AND 14FT SUPL TB 3 CHN

## (undated) DEVICE — GAUZE SPONGES,16 PLY: Brand: CURITY